# Patient Record
Sex: MALE | Race: WHITE | NOT HISPANIC OR LATINO
[De-identification: names, ages, dates, MRNs, and addresses within clinical notes are randomized per-mention and may not be internally consistent; named-entity substitution may affect disease eponyms.]

---

## 2017-01-07 ENCOUNTER — RX ONLY (OUTPATIENT)
Age: 62
Setting detail: RX ONLY
End: 2017-01-07

## 2018-08-21 ENCOUNTER — OFFICE VISIT (OUTPATIENT)
Dept: MEDICAL GROUP | Facility: PHYSICIAN GROUP | Age: 63
End: 2018-08-21
Payer: COMMERCIAL

## 2018-08-21 VITALS
HEART RATE: 74 BPM | RESPIRATION RATE: 16 BRPM | SYSTOLIC BLOOD PRESSURE: 132 MMHG | HEIGHT: 71 IN | BODY MASS INDEX: 27.94 KG/M2 | WEIGHT: 199.6 LBS | DIASTOLIC BLOOD PRESSURE: 60 MMHG | TEMPERATURE: 97.7 F | OXYGEN SATURATION: 99 %

## 2018-08-21 DIAGNOSIS — E78.2 MIXED HYPERLIPIDEMIA: ICD-10-CM

## 2018-08-21 DIAGNOSIS — E11.9 CONTROLLED TYPE 2 DIABETES MELLITUS WITHOUT COMPLICATION, WITHOUT LONG-TERM CURRENT USE OF INSULIN (HCC): ICD-10-CM

## 2018-08-21 DIAGNOSIS — I10 BENIGN ESSENTIAL HTN: ICD-10-CM

## 2018-08-21 DIAGNOSIS — Z12.11 SCREENING FOR COLORECTAL CANCER: ICD-10-CM

## 2018-08-21 DIAGNOSIS — Z12.12 SCREENING FOR COLORECTAL CANCER: ICD-10-CM

## 2018-08-21 PROCEDURE — 99204 OFFICE O/P NEW MOD 45 MIN: CPT | Performed by: FAMILY MEDICINE

## 2018-08-21 RX ORDER — NITROGLYCERIN 0.4 MG/1
0.4 TABLET SUBLINGUAL
COMMUNITY

## 2018-08-21 RX ORDER — METOPROLOL SUCCINATE 50 MG/1
50 TABLET, EXTENDED RELEASE ORAL DAILY
Qty: 90 TAB | Refills: 1 | Status: SHIPPED | OUTPATIENT
Start: 2018-08-21 | End: 2019-02-07 | Stop reason: SDUPTHER

## 2018-08-21 RX ORDER — LISINOPRIL AND HYDROCHLOROTHIAZIDE 20; 12.5 MG/1; MG/1
1 TABLET ORAL DAILY
COMMUNITY
End: 2018-08-21 | Stop reason: SDUPTHER

## 2018-08-21 RX ORDER — ATORVASTATIN CALCIUM 40 MG/1
40 TABLET, FILM COATED ORAL DAILY
Qty: 90 TAB | Refills: 1 | Status: SHIPPED | OUTPATIENT
Start: 2018-08-21 | End: 2019-02-07 | Stop reason: SDUPTHER

## 2018-08-21 RX ORDER — PIOGLITAZONEHYDROCHLORIDE 30 MG/1
30 TABLET ORAL DAILY
Qty: 90 TAB | Refills: 1 | Status: SHIPPED | OUTPATIENT
Start: 2018-08-21 | End: 2019-02-07 | Stop reason: SDUPTHER

## 2018-08-21 RX ORDER — ASPIRIN 81 MG/1
81 TABLET, CHEWABLE ORAL DAILY
COMMUNITY

## 2018-08-21 RX ORDER — ATORVASTATIN CALCIUM 40 MG/1
40 TABLET, FILM COATED ORAL NIGHTLY
COMMUNITY
End: 2018-08-21 | Stop reason: SDUPTHER

## 2018-08-21 RX ORDER — LISINOPRIL AND HYDROCHLOROTHIAZIDE 20; 12.5 MG/1; MG/1
1 TABLET ORAL DAILY
Qty: 90 TAB | Refills: 1 | Status: SHIPPED | OUTPATIENT
Start: 2018-08-21 | End: 2019-02-07 | Stop reason: SDUPTHER

## 2018-08-21 RX ORDER — METOPROLOL SUCCINATE 50 MG/1
50 TABLET, EXTENDED RELEASE ORAL DAILY
COMMUNITY
End: 2018-08-21 | Stop reason: SDUPTHER

## 2018-08-21 ASSESSMENT — ENCOUNTER SYMPTOMS
HEADACHES: 0
NEUROLOGICAL NEGATIVE: 1
DOUBLE VISION: 0
CARDIOVASCULAR NEGATIVE: 1
MUSCULOSKELETAL NEGATIVE: 1
NAUSEA: 0
CONSTITUTIONAL NEGATIVE: 1
FEVER: 0
DIZZINESS: 0
COUGH: 0
HEMOPTYSIS: 0
GASTROINTESTINAL NEGATIVE: 1
RESPIRATORY NEGATIVE: 1
MYALGIAS: 0
BRUISES/BLEEDS EASILY: 0
CHILLS: 0
DEPRESSION: 0
PALPITATIONS: 0
HEARTBURN: 0
TINGLING: 0
PSYCHIATRIC NEGATIVE: 1
BLURRED VISION: 0
EYES NEGATIVE: 1

## 2018-08-21 ASSESSMENT — PATIENT HEALTH QUESTIONNAIRE - PHQ9: CLINICAL INTERPRETATION OF PHQ2 SCORE: 0

## 2018-08-22 NOTE — PROGRESS NOTES
Subjective:      Sarkis Cruz is a 62 y.o. male who presents with Blood Sugar Problem            1. Controlled type 2 diabetes mellitus without complication, without long-term current use of insulin (HCC)  New patient visit, on metformin and januvia for dm  Prior provider wanted him to be on insulin as he was not in great control but he wants to try other non insulin things before he does that  Will have him work on diet and start actos and f/u with labs in 3 mo  Today a1c 9.7    - aspirin (ASA) 81 MG Chew Tab chewable tablet; Take 81 mg by mouth every day.  - nitroglycerin (NITROSTAT) 0.4 MG SL Tab; Place 0.4 mg under tongue every 5 minutes as needed for Chest Pain.  - pioglitazone (ACTOS) 30 MG Tab; Take 1 Tab by mouth every day.  Dispense: 90 Tab; Refill: 1  - COMP METABOLIC PANEL; Future  - HEMOGLOBIN A1C; Future  - LIPID PROFILE; Future  - MICROALBUMIN 24 HR URINE; Future    2. Benign essential HTN  Currently treated for HTN, taking meds with no CP or sob, monitors bp at home periodically. controlled    - aspirin (ASA) 81 MG Chew Tab chewable tablet; Take 81 mg by mouth every day.  - nitroglycerin (NITROSTAT) 0.4 MG SL Tab; Place 0.4 mg under tongue every 5 minutes as needed for Chest Pain.  - pioglitazone (ACTOS) 30 MG Tab; Take 1 Tab by mouth every day.  Dispense: 90 Tab; Refill: 1  - COMP METABOLIC PANEL; Future  - HEMOGLOBIN A1C; Future  - LIPID PROFILE; Future  - MICROALBUMIN 24 HR URINE; Future    3. Mixed hyperlipidemia  Currently treated for HLD, taking meds with no new myalgias or joint pain, watching fats in diet  controlled    - aspirin (ASA) 81 MG Chew Tab chewable tablet; Take 81 mg by mouth every day.  - nitroglycerin (NITROSTAT) 0.4 MG SL Tab; Place 0.4 mg under tongue every 5 minutes as needed for Chest Pain.  - pioglitazone (ACTOS) 30 MG Tab; Take 1 Tab by mouth every day.  Dispense: 90 Tab; Refill: 1  - COMP METABOLIC PANEL; Future  - HEMOGLOBIN A1C; Future  - LIPID PROFILE; Future  -  MICROALBUMIN 24 HR URINE; Future    4. Screening for colorectal cancer    - REFERRAL TO GI FOR COLONOSCOPY    Past Medical History:  No date: Diabetes (HCC)  No date: Hyperlipidemia  No date: Hypertension  Past Surgical History:  No date: UVULOPHARYNGOPALATOPLASTY  Smoking status: Never Smoker                                                              Smokeless tobacco: Never Used                      Alcohol use: No              History reviewed.  No pertinent family history.      Current Outpatient Prescriptions: •  aspirin (ASA) 81 MG Chew Tab chewable tablet, Take 81 mg by mouth every day., Disp: , Rfl: •  nitroglycerin (NITROSTAT) 0.4 MG SL Tab, Place 0.4 mg under tongue every 5 minutes as needed for Chest Pain., Disp: , Rfl: •  pioglitazone (ACTOS) 30 MG Tab, Take 1 Tab by mouth every day., Disp: 90 Tab, Rfl: 1•  atorvastatin (LIPITOR) 40 MG Tab, Take 1 Tab by mouth every day., Disp: 90 Tab, Rfl: 1•  lisinopril-hydrochlorothiazide (PRINZIDE, ZESTORETIC) 20-12.5 MG per tablet, Take 1 Tab by mouth every day., Disp: 90 Tab, Rfl: 1•  metformin (GLUCOPHAGE) 1000 MG tablet, Take 1 Tab by mouth 2 times a day, with meals., Disp: 180 Tab, Rfl: 1•  metoprolol SR (TOPROL XL) 50 MG TABLET SR 24 HR, Take 1 Tab by mouth every day., Disp: 90 Tab, Rfl: 1•  SITagliptin (JANUVIA) 100 MG Tab, Take 1 Tab by mouth every day., Disp: 90 Tab, Rfl: 1    Patient was instructed on the use of medications, either prescriptions or OTC and informed on when the appropriate follow up time period should be. In addition, patient was also instructed that should any acute worsening occur that they should notify this clinic asap or call 911.            Review of Systems   Constitutional: Negative.  Negative for chills and fever.   HENT: Negative.  Negative for hearing loss.    Eyes: Negative.  Negative for blurred vision and double vision.   Respiratory: Negative.  Negative for cough and hemoptysis.    Cardiovascular: Negative.  Negative for  "chest pain and palpitations.   Gastrointestinal: Negative.  Negative for heartburn and nausea.   Genitourinary: Negative.  Negative for dysuria.   Musculoskeletal: Negative.  Negative for myalgias.   Skin: Negative.  Negative for rash.   Neurological: Negative.  Negative for dizziness, tingling and headaches.   Endo/Heme/Allergies: Negative.  Does not bruise/bleed easily.   Psychiatric/Behavioral: Negative.  Negative for depression and suicidal ideas.   All other systems reviewed and are negative.         Objective:     /60   Pulse 74   Temp 36.5 °C (97.7 °F)   Resp 16   Ht 1.803 m (5' 11\")   Wt 90.5 kg (199 lb 9.6 oz)   SpO2 99%   BMI 27.84 kg/m²      Physical Exam   Constitutional: He is oriented to person, place, and time. He appears well-developed and well-nourished. No distress.   HENT:   Head: Normocephalic and atraumatic.   Mouth/Throat: Oropharynx is clear and moist. No oropharyngeal exudate.   Eyes: Pupils are equal, round, and reactive to light.   Cardiovascular: Normal rate, regular rhythm, normal heart sounds and intact distal pulses.  Exam reveals no gallop and no friction rub.    No murmur heard.  Pulmonary/Chest: Effort normal and breath sounds normal. No respiratory distress. He has no wheezes. He has no rales. He exhibits no tenderness.   Neurological: He is alert and oriented to person, place, and time.   Skin: He is not diaphoretic.   Psychiatric: He has a normal mood and affect. His behavior is normal. Judgment and thought content normal.   Nursing note and vitals reviewed.              Assessment/Plan:     1. Controlled type 2 diabetes mellitus without complication, without long-term current use of insulin (HCC)    - aspirin (ASA) 81 MG Chew Tab chewable tablet; Take 81 mg by mouth every day.  - nitroglycerin (NITROSTAT) 0.4 MG SL Tab; Place 0.4 mg under tongue every 5 minutes as needed for Chest Pain.  - pioglitazone (ACTOS) 30 MG Tab; Take 1 Tab by mouth every day.  Dispense: 90 " Tab; Refill: 1  - COMP METABOLIC PANEL; Future  - HEMOGLOBIN A1C; Future  - LIPID PROFILE; Future  - MICROALBUMIN 24 HR URINE; Future    2. Benign essential HTN    - aspirin (ASA) 81 MG Chew Tab chewable tablet; Take 81 mg by mouth every day.  - nitroglycerin (NITROSTAT) 0.4 MG SL Tab; Place 0.4 mg under tongue every 5 minutes as needed for Chest Pain.  - pioglitazone (ACTOS) 30 MG Tab; Take 1 Tab by mouth every day.  Dispense: 90 Tab; Refill: 1  - COMP METABOLIC PANEL; Future  - HEMOGLOBIN A1C; Future  - LIPID PROFILE; Future  - MICROALBUMIN 24 HR URINE; Future    3. Mixed hyperlipidemia    - aspirin (ASA) 81 MG Chew Tab chewable tablet; Take 81 mg by mouth every day.  - nitroglycerin (NITROSTAT) 0.4 MG SL Tab; Place 0.4 mg under tongue every 5 minutes as needed for Chest Pain.  - pioglitazone (ACTOS) 30 MG Tab; Take 1 Tab by mouth every day.  Dispense: 90 Tab; Refill: 1  - COMP METABOLIC PANEL; Future  - HEMOGLOBIN A1C; Future  - LIPID PROFILE; Future  - MICROALBUMIN 24 HR URINE; Future    4. Screening for colorectal cancer  - REFERRAL TO GI FOR COLONOSCOPY

## 2019-02-07 DIAGNOSIS — E78.2 MIXED HYPERLIPIDEMIA: ICD-10-CM

## 2019-02-07 DIAGNOSIS — E11.9 CONTROLLED TYPE 2 DIABETES MELLITUS WITHOUT COMPLICATION, WITHOUT LONG-TERM CURRENT USE OF INSULIN (HCC): ICD-10-CM

## 2019-02-07 DIAGNOSIS — I10 BENIGN ESSENTIAL HTN: ICD-10-CM

## 2019-02-07 RX ORDER — ATORVASTATIN CALCIUM 40 MG/1
TABLET, FILM COATED ORAL
Qty: 90 TAB | Refills: 1 | Status: SHIPPED | OUTPATIENT
Start: 2019-02-07

## 2019-02-07 RX ORDER — METOPROLOL SUCCINATE 50 MG/1
TABLET, EXTENDED RELEASE ORAL
Qty: 90 TAB | Refills: 2 | Status: SHIPPED | OUTPATIENT
Start: 2019-02-07

## 2019-02-07 RX ORDER — LISINOPRIL AND HYDROCHLOROTHIAZIDE 20; 12.5 MG/1; MG/1
TABLET ORAL
Qty: 90 TAB | Refills: 1 | Status: SHIPPED | OUTPATIENT
Start: 2019-02-07

## 2019-02-07 RX ORDER — PIOGLITAZONEHYDROCHLORIDE 30 MG/1
TABLET ORAL
Qty: 90 TAB | Refills: 2 | Status: SHIPPED | OUTPATIENT
Start: 2019-02-07 | End: 2023-02-05

## 2019-02-07 RX ORDER — SITAGLIPTIN 100 MG/1
TABLET, FILM COATED ORAL
Qty: 90 TAB | Refills: 2 | Status: SHIPPED | OUTPATIENT
Start: 2019-02-07 | End: 2023-02-05

## 2019-02-07 NOTE — TELEPHONE ENCOUNTER
Was the patient seen in the last year in this department? Yes    Does patient have an active prescription for medications requested? Yes    Received Request Via: Pharmacy        Last visit: 8/21/18  Last labs:8/21/18

## 2021-06-30 ENCOUNTER — APPOINTMENT (RX ONLY)
Dept: URBAN - METROPOLITAN AREA CLINIC 4 | Facility: CLINIC | Age: 66
Setting detail: DERMATOLOGY
End: 2021-06-30

## 2021-06-30 DIAGNOSIS — Z71.89 OTHER SPECIFIED COUNSELING: ICD-10-CM

## 2021-06-30 DIAGNOSIS — D18.0 HEMANGIOMA: ICD-10-CM

## 2021-06-30 DIAGNOSIS — L57.0 ACTINIC KERATOSIS: ICD-10-CM

## 2021-06-30 DIAGNOSIS — L81.4 OTHER MELANIN HYPERPIGMENTATION: ICD-10-CM

## 2021-06-30 DIAGNOSIS — D22 MELANOCYTIC NEVI: ICD-10-CM

## 2021-06-30 DIAGNOSIS — L82.1 OTHER SEBORRHEIC KERATOSIS: ICD-10-CM

## 2021-06-30 DIAGNOSIS — L21.8 OTHER SEBORRHEIC DERMATITIS: ICD-10-CM

## 2021-06-30 PROBLEM — D18.01 HEMANGIOMA OF SKIN AND SUBCUTANEOUS TISSUE: Status: ACTIVE | Noted: 2021-06-30

## 2021-06-30 PROBLEM — D22.5 MELANOCYTIC NEVI OF TRUNK: Status: ACTIVE | Noted: 2021-06-30

## 2021-06-30 PROBLEM — D48.5 NEOPLASM OF UNCERTAIN BEHAVIOR OF SKIN: Status: ACTIVE | Noted: 2021-06-30

## 2021-06-30 PROCEDURE — ? BIOPSY BY SHAVE METHOD

## 2021-06-30 PROCEDURE — ? PRESCRIPTION

## 2021-06-30 PROCEDURE — ? COUNSELING

## 2021-06-30 PROCEDURE — 17000 DESTRUCT PREMALG LESION: CPT | Mod: 59

## 2021-06-30 PROCEDURE — ? LIQUID NITROGEN

## 2021-06-30 PROCEDURE — ? SUNSCREEN TREATMENT REGIMEN

## 2021-06-30 PROCEDURE — 99203 OFFICE O/P NEW LOW 30 MIN: CPT | Mod: 25

## 2021-06-30 PROCEDURE — ? SUNSCREEN RECOMMENDATIONS

## 2021-06-30 PROCEDURE — 11102 TANGNTL BX SKIN SINGLE LES: CPT | Mod: 59

## 2021-06-30 PROCEDURE — 69100 BIOPSY OF EXTERNAL EAR: CPT | Mod: 59

## 2021-06-30 PROCEDURE — 17003 DESTRUCT PREMALG LES 2-14: CPT

## 2021-06-30 RX ORDER — HYDROCORTISONE 25 MG/G
1 CREAM TOPICAL BID
Qty: 1 | Refills: 1 | Status: ERX | COMMUNITY
Start: 2021-06-30

## 2021-06-30 RX ADMIN — HYDROCORTISONE 1: 25 CREAM TOPICAL at 00:00

## 2021-06-30 ASSESSMENT — LOCATION ZONE DERM
LOCATION ZONE: NOSE
LOCATION ZONE: EAR
LOCATION ZONE: FACE
LOCATION ZONE: HAND
LOCATION ZONE: SCALP
LOCATION ZONE: TRUNK
LOCATION ZONE: LEG

## 2021-06-30 ASSESSMENT — LOCATION SIMPLE DESCRIPTION DERM
LOCATION SIMPLE: LEFT CHEEK
LOCATION SIMPLE: ABDOMEN
LOCATION SIMPLE: LEFT HAND
LOCATION SIMPLE: LEFT ANKLE
LOCATION SIMPLE: RIGHT ZYGOMA
LOCATION SIMPLE: ANTERIOR SCALP
LOCATION SIMPLE: LEFT UPPER BACK
LOCATION SIMPLE: RIGHT HAND
LOCATION SIMPLE: RIGHT EAR
LOCATION SIMPLE: CHEST
LOCATION SIMPLE: NOSE

## 2021-06-30 ASSESSMENT — LOCATION DETAILED DESCRIPTION DERM
LOCATION DETAILED: LEFT MEDIAL INFERIOR CHEST
LOCATION DETAILED: PERIUMBILICAL SKIN
LOCATION DETAILED: LEFT INFERIOR CENTRAL MALAR CHEEK
LOCATION DETAILED: STERNUM
LOCATION DETAILED: RIGHT INFERIOR HELIX
LOCATION DETAILED: LEFT ANKLE
LOCATION DETAILED: RIGHT RADIAL DORSAL HAND
LOCATION DETAILED: RIGHT MEDIAL ZYGOMA
LOCATION DETAILED: LEFT CENTRAL MALAR CHEEK
LOCATION DETAILED: LEFT SUPERIOR CENTRAL MALAR CHEEK
LOCATION DETAILED: LEFT SUPERIOR UPPER BACK
LOCATION DETAILED: EPIGASTRIC SKIN
LOCATION DETAILED: NASAL TIP
LOCATION DETAILED: LEFT ULNAR DORSAL HAND
LOCATION DETAILED: MID-FRONTAL SCALP

## 2023-02-05 ENCOUNTER — HOSPITAL ENCOUNTER (INPATIENT)
Facility: MEDICAL CENTER | Age: 68
LOS: 11 days | DRG: 373 | End: 2023-02-16
Attending: EMERGENCY MEDICINE | Admitting: INTERNAL MEDICINE
Payer: MEDICARE

## 2023-02-05 ENCOUNTER — APPOINTMENT (OUTPATIENT)
Dept: RADIOLOGY | Facility: MEDICAL CENTER | Age: 68
DRG: 373 | End: 2023-02-05
Attending: EMERGENCY MEDICINE
Payer: MEDICARE

## 2023-02-05 DIAGNOSIS — K65.1 PELVIC ABSCESS IN MALE (HCC): ICD-10-CM

## 2023-02-05 DIAGNOSIS — R10.32 LEFT LOWER QUADRANT ABDOMINAL PAIN: ICD-10-CM

## 2023-02-05 DIAGNOSIS — R19.00 PELVIC MASS: ICD-10-CM

## 2023-02-05 DIAGNOSIS — E11.9 CONTROLLED TYPE 2 DIABETES MELLITUS WITHOUT COMPLICATION, WITHOUT LONG-TERM CURRENT USE OF INSULIN (HCC): ICD-10-CM

## 2023-02-05 PROBLEM — R74.8 ELEVATED LIPASE: Status: ACTIVE | Noted: 2023-02-05

## 2023-02-05 PROBLEM — G89.29 CHRONIC RIGHT SHOULDER PAIN: Status: ACTIVE | Noted: 2023-02-05

## 2023-02-05 PROBLEM — M25.511 CHRONIC RIGHT SHOULDER PAIN: Status: ACTIVE | Noted: 2023-02-05

## 2023-02-05 PROBLEM — D64.9 NORMOCYTIC ANEMIA: Status: ACTIVE | Noted: 2023-02-05

## 2023-02-05 PROBLEM — Z85.46 HISTORY OF PROSTATE CANCER: Status: ACTIVE | Noted: 2023-02-05

## 2023-02-05 PROBLEM — K68.12 PSOAS ABSCESS (HCC): Status: ACTIVE | Noted: 2023-02-05

## 2023-02-05 LAB
ALBUMIN SERPL BCP-MCNC: 3.3 G/DL (ref 3.2–4.9)
ALBUMIN/GLOB SERPL: 0.9 G/DL
ALP SERPL-CCNC: 91 U/L (ref 30–99)
ALT SERPL-CCNC: 28 U/L (ref 2–50)
ANION GAP SERPL CALC-SCNC: 13 MMOL/L (ref 7–16)
APPEARANCE UR: CLEAR
AST SERPL-CCNC: 22 U/L (ref 12–45)
BASOPHILS # BLD AUTO: 0.2 % (ref 0–1.8)
BASOPHILS # BLD: 0.03 K/UL (ref 0–0.12)
BILIRUB SERPL-MCNC: 0.5 MG/DL (ref 0.1–1.5)
BILIRUB UR QL STRIP.AUTO: NEGATIVE
BLOOD CULTURE HOLD CXBCH: NORMAL
BLOOD CULTURE HOLD CXBCH: NORMAL
BUN SERPL-MCNC: 17 MG/DL (ref 8–22)
CALCIUM ALBUM COR SERPL-MCNC: 9.5 MG/DL (ref 8.5–10.5)
CALCIUM SERPL-MCNC: 8.9 MG/DL (ref 8.5–10.5)
CHLORIDE SERPL-SCNC: 97 MMOL/L (ref 96–112)
CO2 SERPL-SCNC: 27 MMOL/L (ref 20–33)
COLOR UR: YELLOW
CREAT SERPL-MCNC: 0.94 MG/DL (ref 0.5–1.4)
EOSINOPHIL # BLD AUTO: 0.09 K/UL (ref 0–0.51)
EOSINOPHIL NFR BLD: 0.7 % (ref 0–6.9)
ERYTHROCYTE [DISTWIDTH] IN BLOOD BY AUTOMATED COUNT: 42.5 FL (ref 35.9–50)
GFR SERPLBLD CREATININE-BSD FMLA CKD-EPI: 88 ML/MIN/1.73 M 2
GLOBULIN SER CALC-MCNC: 3.6 G/DL (ref 1.9–3.5)
GLUCOSE BLD STRIP.AUTO-MCNC: 78 MG/DL (ref 65–99)
GLUCOSE BLD STRIP.AUTO-MCNC: 91 MG/DL (ref 65–99)
GLUCOSE SERPL-MCNC: 107 MG/DL (ref 65–99)
GLUCOSE UR STRIP.AUTO-MCNC: NEGATIVE MG/DL
HCT VFR BLD AUTO: 35.6 % (ref 42–52)
HGB BLD-MCNC: 11.7 G/DL (ref 14–18)
IMM GRANULOCYTES # BLD AUTO: 0.05 K/UL (ref 0–0.11)
IMM GRANULOCYTES NFR BLD AUTO: 0.4 % (ref 0–0.9)
KETONES UR STRIP.AUTO-MCNC: NEGATIVE MG/DL
LEUKOCYTE ESTERASE UR QL STRIP.AUTO: NEGATIVE
LIPASE SERPL-CCNC: 194 U/L (ref 11–82)
LYMPHOCYTES # BLD AUTO: 1.14 K/UL (ref 1–4.8)
LYMPHOCYTES NFR BLD: 8.5 % (ref 22–41)
MCH RBC QN AUTO: 29.1 PG (ref 27–33)
MCHC RBC AUTO-ENTMCNC: 32.9 G/DL (ref 33.7–35.3)
MCV RBC AUTO: 88.6 FL (ref 81.4–97.8)
MICRO URNS: NORMAL
MONOCYTES # BLD AUTO: 1.09 K/UL (ref 0–0.85)
MONOCYTES NFR BLD AUTO: 8.2 % (ref 0–13.4)
NEUTROPHILS # BLD AUTO: 10.97 K/UL (ref 1.82–7.42)
NEUTROPHILS NFR BLD: 82 % (ref 44–72)
NITRITE UR QL STRIP.AUTO: NEGATIVE
NRBC # BLD AUTO: 0 K/UL
NRBC BLD-RTO: 0 /100 WBC
PH UR STRIP.AUTO: 5 [PH] (ref 5–8)
PLATELET # BLD AUTO: 414 K/UL (ref 164–446)
PMV BLD AUTO: 9.1 FL (ref 9–12.9)
POTASSIUM SERPL-SCNC: 3.1 MMOL/L (ref 3.6–5.5)
PROT SERPL-MCNC: 6.9 G/DL (ref 6–8.2)
PROT UR QL STRIP: NEGATIVE MG/DL
RBC # BLD AUTO: 4.02 M/UL (ref 4.7–6.1)
RBC UR QL AUTO: NEGATIVE
SODIUM SERPL-SCNC: 137 MMOL/L (ref 135–145)
SP GR UR STRIP.AUTO: 1.02
UROBILINOGEN UR STRIP.AUTO-MCNC: 1 MG/DL
WBC # BLD AUTO: 13.4 K/UL (ref 4.8–10.8)

## 2023-02-05 PROCEDURE — 82962 GLUCOSE BLOOD TEST: CPT

## 2023-02-05 PROCEDURE — 96365 THER/PROPH/DIAG IV INF INIT: CPT

## 2023-02-05 PROCEDURE — 74177 CT ABD & PELVIS W/CONTRAST: CPT

## 2023-02-05 PROCEDURE — 99223 1ST HOSP IP/OBS HIGH 75: CPT | Performed by: INTERNAL MEDICINE

## 2023-02-05 PROCEDURE — 80053 COMPREHEN METABOLIC PANEL: CPT

## 2023-02-05 PROCEDURE — 36415 COLL VENOUS BLD VENIPUNCTURE: CPT

## 2023-02-05 PROCEDURE — A9270 NON-COVERED ITEM OR SERVICE: HCPCS

## 2023-02-05 PROCEDURE — 99285 EMERGENCY DEPT VISIT HI MDM: CPT

## 2023-02-05 PROCEDURE — 96367 TX/PROPH/DG ADDL SEQ IV INF: CPT

## 2023-02-05 PROCEDURE — 700117 HCHG RX CONTRAST REV CODE 255: Performed by: EMERGENCY MEDICINE

## 2023-02-05 PROCEDURE — 700101 HCHG RX REV CODE 250

## 2023-02-05 PROCEDURE — 700105 HCHG RX REV CODE 258: Performed by: EMERGENCY MEDICINE

## 2023-02-05 PROCEDURE — 700111 HCHG RX REV CODE 636 W/ 250 OVERRIDE (IP): Performed by: EMERGENCY MEDICINE

## 2023-02-05 PROCEDURE — 770020 HCHG ROOM/CARE - TELE (206)

## 2023-02-05 PROCEDURE — 700102 HCHG RX REV CODE 250 W/ 637 OVERRIDE(OP)

## 2023-02-05 PROCEDURE — 700101 HCHG RX REV CODE 250: Performed by: EMERGENCY MEDICINE

## 2023-02-05 PROCEDURE — 81003 URINALYSIS AUTO W/O SCOPE: CPT

## 2023-02-05 PROCEDURE — 96375 TX/PRO/DX INJ NEW DRUG ADDON: CPT

## 2023-02-05 PROCEDURE — 85025 COMPLETE CBC W/AUTO DIFF WBC: CPT

## 2023-02-05 PROCEDURE — 83690 ASSAY OF LIPASE: CPT

## 2023-02-05 RX ORDER — METRONIDAZOLE 500 MG/100ML
500 INJECTION, SOLUTION INTRAVENOUS EVERY 8 HOURS
Status: DISCONTINUED | OUTPATIENT
Start: 2023-02-05 | End: 2023-02-05

## 2023-02-05 RX ORDER — INSULIN DEGLUDEC 200 U/ML
60 INJECTION, SOLUTION SUBCUTANEOUS
COMMUNITY
End: 2023-03-06

## 2023-02-05 RX ORDER — OXYCODONE HYDROCHLORIDE 5 MG/1
2.5 TABLET ORAL EVERY 4 HOURS PRN
Status: DISCONTINUED | OUTPATIENT
Start: 2023-02-05 | End: 2023-02-07

## 2023-02-05 RX ORDER — OXYCODONE HYDROCHLORIDE 5 MG/1
5 TABLET ORAL EVERY 4 HOURS PRN
Status: DISCONTINUED | OUTPATIENT
Start: 2023-02-05 | End: 2023-02-07

## 2023-02-05 RX ORDER — BISACODYL 10 MG
10 SUPPOSITORY, RECTAL RECTAL
Status: DISCONTINUED | OUTPATIENT
Start: 2023-02-05 | End: 2023-02-16 | Stop reason: HOSPADM

## 2023-02-05 RX ORDER — ACETAMINOPHEN 325 MG/1
650 TABLET ORAL EVERY 6 HOURS PRN
Status: DISCONTINUED | OUTPATIENT
Start: 2023-02-05 | End: 2023-02-16 | Stop reason: HOSPADM

## 2023-02-05 RX ORDER — LIDOCAINE 50 MG/G
1 PATCH TOPICAL EVERY 24 HOURS
Status: DISCONTINUED | OUTPATIENT
Start: 2023-02-05 | End: 2023-02-16 | Stop reason: HOSPADM

## 2023-02-05 RX ORDER — HYDROCHLOROTHIAZIDE 25 MG/1
12.5 TABLET ORAL
Status: DISCONTINUED | OUTPATIENT
Start: 2023-02-06 | End: 2023-02-16 | Stop reason: HOSPADM

## 2023-02-05 RX ORDER — POTASSIUM CHLORIDE 20 MEQ/1
40 TABLET, EXTENDED RELEASE ORAL DAILY
Status: COMPLETED | OUTPATIENT
Start: 2023-02-05 | End: 2023-02-06

## 2023-02-05 RX ORDER — HEPARIN SODIUM 5000 [USP'U]/ML
5000 INJECTION, SOLUTION INTRAVENOUS; SUBCUTANEOUS EVERY 8 HOURS
Status: DISCONTINUED | OUTPATIENT
Start: 2023-02-05 | End: 2023-02-16 | Stop reason: HOSPADM

## 2023-02-05 RX ORDER — METOPROLOL SUCCINATE 25 MG/1
50 TABLET, EXTENDED RELEASE ORAL
Status: DISCONTINUED | OUTPATIENT
Start: 2023-02-06 | End: 2023-02-16 | Stop reason: HOSPADM

## 2023-02-05 RX ORDER — CEFTRIAXONE 2 G/1
2000 INJECTION, POWDER, FOR SOLUTION INTRAMUSCULAR; INTRAVENOUS ONCE
Status: COMPLETED | OUTPATIENT
Start: 2023-02-05 | End: 2023-02-05

## 2023-02-05 RX ORDER — ATORVASTATIN CALCIUM 40 MG/1
40 TABLET, FILM COATED ORAL EVERY EVENING
Status: DISCONTINUED | OUTPATIENT
Start: 2023-02-05 | End: 2023-02-16 | Stop reason: HOSPADM

## 2023-02-05 RX ORDER — LISINOPRIL 20 MG/1
20 TABLET ORAL
Status: DISCONTINUED | OUTPATIENT
Start: 2023-02-06 | End: 2023-02-16 | Stop reason: HOSPADM

## 2023-02-05 RX ORDER — AMOXICILLIN 250 MG
2 CAPSULE ORAL 2 TIMES DAILY
Status: DISCONTINUED | OUTPATIENT
Start: 2023-02-05 | End: 2023-02-16 | Stop reason: HOSPADM

## 2023-02-05 RX ORDER — SODIUM CHLORIDE 9 MG/ML
1000 INJECTION, SOLUTION INTRAVENOUS ONCE
Status: COMPLETED | OUTPATIENT
Start: 2023-02-05 | End: 2023-02-05

## 2023-02-05 RX ORDER — METRONIDAZOLE 500 MG/100ML
500 INJECTION, SOLUTION INTRAVENOUS ONCE
Status: COMPLETED | OUTPATIENT
Start: 2023-02-05 | End: 2023-02-05

## 2023-02-05 RX ORDER — POTASSIUM CHLORIDE 7.45 MG/ML
10 INJECTION INTRAVENOUS ONCE
Status: COMPLETED | OUTPATIENT
Start: 2023-02-05 | End: 2023-02-05

## 2023-02-05 RX ORDER — SIMETHICONE 125 MG
125 TABLET,CHEWABLE ORAL 3 TIMES DAILY PRN
Status: DISCONTINUED | OUTPATIENT
Start: 2023-02-05 | End: 2023-02-16 | Stop reason: HOSPADM

## 2023-02-05 RX ORDER — POLYETHYLENE GLYCOL 3350 17 G/17G
1 POWDER, FOR SOLUTION ORAL
Status: DISCONTINUED | OUTPATIENT
Start: 2023-02-05 | End: 2023-02-16 | Stop reason: HOSPADM

## 2023-02-05 RX ADMIN — SODIUM CHLORIDE 1000 ML: 9 INJECTION, SOLUTION INTRAVENOUS at 12:12

## 2023-02-05 RX ADMIN — IOHEXOL 100 ML: 350 INJECTION, SOLUTION INTRAVENOUS at 11:57

## 2023-02-05 RX ADMIN — POTASSIUM CHLORIDE 10 MEQ: 7.46 INJECTION, SOLUTION INTRAVENOUS at 12:18

## 2023-02-05 RX ADMIN — POTASSIUM CHLORIDE 40 MEQ: 1500 TABLET, EXTENDED RELEASE ORAL at 17:09

## 2023-02-05 RX ADMIN — ACETAMINOPHEN 650 MG: 325 TABLET, FILM COATED ORAL at 14:43

## 2023-02-05 RX ADMIN — CEFTRIAXONE SODIUM 2000 MG: 2 INJECTION, POWDER, FOR SOLUTION INTRAMUSCULAR; INTRAVENOUS at 13:36

## 2023-02-05 RX ADMIN — OXYCODONE HYDROCHLORIDE 5 MG: 5 TABLET ORAL at 19:29

## 2023-02-05 RX ADMIN — ATORVASTATIN CALCIUM 40 MG: 40 TABLET, FILM COATED ORAL at 17:09

## 2023-02-05 RX ADMIN — OXYCODONE HYDROCHLORIDE 5 MG: 5 TABLET ORAL at 23:54

## 2023-02-05 RX ADMIN — SENNOSIDES AND DOCUSATE SODIUM 2 TABLET: 50; 8.6 TABLET ORAL at 17:09

## 2023-02-05 RX ADMIN — LIDOCAINE 1 PATCH: 50 PATCH TOPICAL at 14:50

## 2023-02-05 RX ADMIN — METRONIDAZOLE 500 MG: 5 INJECTION, SOLUTION INTRAVENOUS at 13:41

## 2023-02-05 RX ADMIN — SIMETHICONE 125 MG: 125 TABLET, CHEWABLE ORAL at 22:20

## 2023-02-05 ASSESSMENT — ENCOUNTER SYMPTOMS
DIARRHEA: 0
NAUSEA: 0
ORTHOPNEA: 0
SHORTNESS OF BREATH: 0
PALPITATIONS: 0
BLOOD IN STOOL: 0
NECK PAIN: 0
CHILLS: 0
SORE THROAT: 0
HEMOPTYSIS: 0
COUGH: 0
SEIZURES: 0
DIAPHORESIS: 0
LOSS OF CONSCIOUSNESS: 0
WHEEZING: 0
CLAUDICATION: 0
HEADACHES: 0
PND: 0
FLANK PAIN: 0
HEARTBURN: 0
ABDOMINAL PAIN: 1
BACK PAIN: 1
FALLS: 0
MYALGIAS: 1
WEIGHT LOSS: 0
CONSTIPATION: 1
FEVER: 0
SINUS PAIN: 0
WEAKNESS: 1
VOMITING: 0
DIZZINESS: 0
SPUTUM PRODUCTION: 0

## 2023-02-05 ASSESSMENT — COGNITIVE AND FUNCTIONAL STATUS - GENERAL
CLIMB 3 TO 5 STEPS WITH RAILING: A LITTLE
DAILY ACTIVITIY SCORE: 20
SUGGESTED CMS G CODE MODIFIER DAILY ACTIVITY: CJ
STANDING UP FROM CHAIR USING ARMS: A LITTLE
DRESSING REGULAR UPPER BODY CLOTHING: A LITTLE
DRESSING REGULAR LOWER BODY CLOTHING: A LITTLE
SUGGESTED CMS G CODE MODIFIER MOBILITY: CK
HELP NEEDED FOR BATHING: A LITTLE
MOVING TO AND FROM BED TO CHAIR: A LITTLE
MOBILITY SCORE: 18
MOVING FROM LYING ON BACK TO SITTING ON SIDE OF FLAT BED: A LITTLE
TOILETING: A LITTLE
WALKING IN HOSPITAL ROOM: A LITTLE
TURNING FROM BACK TO SIDE WHILE IN FLAT BAD: A LITTLE

## 2023-02-05 ASSESSMENT — PAIN DESCRIPTION - PAIN TYPE: TYPE: ACUTE PAIN

## 2023-02-05 ASSESSMENT — LIFESTYLE VARIABLES
TOTAL SCORE: 0
ON A TYPICAL DAY WHEN YOU DRINK ALCOHOL HOW MANY DRINKS DO YOU HAVE: 0
ALCOHOL_USE: NO
HAVE PEOPLE ANNOYED YOU BY CRITICIZING YOUR DRINKING: NO
DOES PATIENT WANT TO STOP DRINKING: NO
TOTAL SCORE: 0
HAVE YOU EVER FELT YOU SHOULD CUT DOWN ON YOUR DRINKING: NO
HOW MANY TIMES IN THE PAST YEAR HAVE YOU HAD 5 OR MORE DRINKS IN A DAY: 0
EVER HAD A DRINK FIRST THING IN THE MORNING TO STEADY YOUR NERVES TO GET RID OF A HANGOVER: NO
EVER FELT BAD OR GUILTY ABOUT YOUR DRINKING: NO
TOTAL SCORE: 0
AVERAGE NUMBER OF DAYS PER WEEK YOU HAVE A DRINK CONTAINING ALCOHOL: 0
CONSUMPTION TOTAL: NEGATIVE

## 2023-02-05 ASSESSMENT — FIBROSIS 4 INDEX
FIB4 SCORE: 0.68
FIB4 SCORE: 0.68

## 2023-02-05 ASSESSMENT — PAIN SCALES - PAIN ASSESSMENT IN ADVANCED DEMENTIA (PAINAD): BREATHING: NORMAL

## 2023-02-05 ASSESSMENT — PATIENT HEALTH QUESTIONNAIRE - PHQ9
1. LITTLE INTEREST OR PLEASURE IN DOING THINGS: NOT AT ALL
SUM OF ALL RESPONSES TO PHQ9 QUESTIONS 1 AND 2: 0
2. FEELING DOWN, DEPRESSED, IRRITABLE, OR HOPELESS: NOT AT ALL

## 2023-02-05 NOTE — ED NOTES
IV left forearm established, blood cultures drawn and sent to lab. Medicated per MAR. [facility] medical team at bedside.

## 2023-02-05 NOTE — ASSESSMENT & PLAN NOTE
-Prinzide not available, ordered seperately as Lisinopril 20 and HCTZ 12.5   -Continue home metoprolol XL 50

## 2023-02-05 NOTE — H&P
"[facility] History & Physical Note    Date of Service  2/5/2023    [facility] Team: R IM White Team   Attending: Jose Cruz Obando M.d.  Senior Resident: Dr. Madison  Intern:  Dr. Magallanes  Contact Number: [phone]    Primary Care Physician  Pcp Not In Computer    Consultants  None    Code Status  Full Code    Chief Complaint  Chief Complaint   Patient presents with    Groin Pain     L groin x 10 days, denies pain with urination but pt has decreased sensation s/t radiation tx for prostate cancer 1 year ago, denies hematuria     Loss of Appetite     X 2 weeks, states he has felt ill and had chills/tremors two weeks ago    Hip Pain     L posterior hip X 2 weeks, pt states he has been staying in bed and sleeping mostly for 2 weeks, believes his hip hurts from laying on it excessively, pain keeps pt from moving LLE well    Constipation     LBM 3 days ago but states it was small, pt is taking stool softeners        History of Presenting Illness (HPI):   Srinivasan Cruz is a 67 y.o. male with PMH of type 2 diabetes, HLD, and HTN who presented 2/5/2023 with chief complaint of 2 weeks of hip pain. Patient reported that about 2 weeks ago he started having fevers, chills, and severe L hip and and L lower back pain. He described the pain as \"like a charley horse\" but not quite like cramping. His pain was exacerbated with any movement (sitting, twisting, walking) and with pushing on the area. He tried ibuprofen and tylenol without relief. Nothing has alleviated his sx. He also noted about 1 wk history of groin pain that started acutely after he twisted while trying to move. He initially presented to an urgent care in [place] and was prescribed a muscle relaxant for suspected sciatica. When his pain continued he decided to present to the ED.     On presenting to the ED vitals unremarkable. Labs notable for WBCs 13.4, Hgb 11.7, Lipase 194, and Potassium 3.1. Blood cultures were collected. CT abdomen/pelvis noted " nonspecific hypodense mass vs complex fluid collection in the L medial hemipelvis abutting L iliopsoas. Pancreas was unremarkable. ROS was notable for abdominal pain and constipation (see full ROS below)     Reviewed code status with pt, he would like to remain full code at this time.     I discussed the plan of care with  patient and internal medicine team .    Review of Systems  Review of Systems   Constitutional:  Positive for malaise/fatigue. Negative for chills, diaphoresis, fever and weight loss.   HENT:  Negative for congestion, sinus pain and sore throat.    Respiratory:  Negative for cough, hemoptysis, sputum production, shortness of breath and wheezing.    Cardiovascular:  Negative for chest pain, palpitations, orthopnea, claudication, leg swelling and PND.   Gastrointestinal:  Positive for abdominal pain and constipation. Negative for blood in stool, diarrhea, heartburn, melena, nausea and vomiting.   Genitourinary:  Negative for dysuria, flank pain, frequency, hematuria and urgency.   Musculoskeletal:  Positive for back pain, joint pain and myalgias. Negative for falls and neck pain.        L hip pain   Skin:  Negative for itching and rash.   Neurological:  Positive for weakness. Negative for dizziness, seizures, loss of consciousness and headaches.     Past Medical History   has a past medical history of Diabetes (HCC), Hyperlipidemia, and Hypertension.    Surgical History   has a past surgical history that includes uvulopharyngopalatoplasty.     Family History  family history includes Arthritis in his mother; Cancer in his father; Osteoporosis in his mother.   Family history reviewed with patient.     Social History  Tobacco: None  Alcohol: Occasional   Recreational drugs (illegal or prescription): None  Employment: Retired  Living Situation: Lives at home with wife   Recent Travel: none  Primary Care Provider: Not Reviewed  Other (stressors, spirituality, exposures): None    Allergies  Allergies    Allergen Reactions    Seasonal Shortness of Breath       Medications  Prior to Admission Medications   Prescriptions Last Dose Informant Patient Reported? Taking?   aspirin (ASA) 81 MG Chew Tab chewable tablet 2/5/2023 at am  Yes No   Sig: Take 81 mg by mouth every day.   atorvastatin (LIPITOR) 40 MG Tab 2/4/2023 at hs  No No   Sig: TAKE ONE TABLET BY MOUTH EVERY DAY   Patient taking differently: Take 40 mg by mouth every day.   lisinopril-hydrochlorothiazide (PRINZIDE, ZESTORETIC) 20-12.5 MG per tablet 2/5/2023 at am  No No   Sig: TAKE ONE TABLET BY MOUTH EVERY DAY   Patient taking differently: Take 1 Tablet by mouth every day.   metformin (GLUCOPHAGE) 1000 MG tablet 2/5/2023 at am  No No   Sig: TAKE ONE TABLET BY MOUTH TWICE DAILY WITH MEALS   Patient taking differently: Take 1,000 mg by mouth 2 times a day with meals.   metoprolol SR (TOPROL XL) 50 MG TABLET SR 24 HR 2/5/2023 at am  No No   Sig: TAKE ONE TABLET BY MOUTH EVERY DAY   Patient taking differently: Take 50 mg by mouth every day.   nitroglycerin (NITROSTAT) 0.4 MG SL Tab unk at unk  Yes No   Sig: Place 0.4 mg under tongue every 5 minutes as needed for Chest Pain.      Facility-Administered Medications: None       Physical Exam  Temp:  [36.1 °C (97 °F)-37.2 °C (99 °F)] 37.2 °C (99 °F)  Pulse:  [64-78] 70  Resp:  [16-20] 18  BP: (105-131)/(61-72) 113/70  SpO2:  [90 %-99 %] 96 %  Blood Pressure : 125/72   Temperature: 36.1 °C (97 °F)   Pulse: 73   Respiration: 18   Pulse Oximetry: 94 %       Physical Exam  Vitals and nursing note reviewed.   Constitutional:       General: He is not in acute distress.     Appearance: Normal appearance. He is normal weight. He is ill-appearing. He is not toxic-appearing or diaphoretic.   HENT:      Head: Normocephalic and atraumatic.      Mouth/Throat:      Mouth: Mucous membranes are moist.      Pharynx: Oropharynx is clear. No oropharyngeal exudate or posterior oropharyngeal erythema.   Cardiovascular:      Rate and  Rhythm: Normal rate and regular rhythm.      Pulses: Normal pulses.      Heart sounds: Normal heart sounds. No murmur heard.    No friction rub. No gallop.   Pulmonary:      Effort: Pulmonary effort is normal. No respiratory distress.      Breath sounds: Normal breath sounds. No stridor. No wheezing, rhonchi or rales.   Abdominal:      General: Abdomen is flat. Bowel sounds are normal. There is no distension.      Palpations: Abdomen is soft. There is no mass.      Tenderness: There is abdominal tenderness. There is guarding. There is no right CVA tenderness, left CVA tenderness or rebound.      Hernia: No hernia is present.      Comments: LLQ pain with palpation.    Genitourinary:     Penis: Normal.       Testes: Normal.   Musculoskeletal:         General: No tenderness, deformity or signs of injury.      Right lower leg: No edema.      Left lower leg: No edema.   Skin:     Capillary Refill: Capillary refill takes less than 2 seconds.      Coloration: Skin is not jaundiced.      Findings: No bruising, lesion or rash.   Neurological:      Mental Status: He is alert and oriented to person, place, and time.      Motor: Weakness present.      Gait: Gait normal.      Deep Tendon Reflexes: Reflexes normal.      Comments: 3/5 strength L hip with flexion. 5/5 strength bilat upper and lower extremities.    Psychiatric:         Mood and Affect: Mood normal.         Thought Content: Thought content normal.         Judgment: Judgment normal.       Laboratory:  Recent Labs     02/05/23  1016   WBC 13.4*   RBC 4.02*   HEMOGLOBIN 11.7*   HEMATOCRIT 35.6*   MCV 88.6   MCH 29.1   MCHC 32.9*   RDW 42.5   PLATELETCT 414   MPV 9.1     Recent Labs     02/05/23  1016   SODIUM 137   POTASSIUM 3.1*   CHLORIDE 97   CO2 27   GLUCOSE 107*   BUN 17   CREATININE 0.94   CALCIUM 8.9     Recent Labs     02/05/23  1016   ALTSGPT 28   ASTSGOT 22   ALKPHOSPHAT 91   TBILIRUBIN 0.5   LIPASE 194*   GLUCOSE 107*         No results for input(s): NTPROBNP  in the last 72 hours.      No results for input(s): TROPONINT in the last 72 hours.    Imaging:  CT-ABDOMEN-PELVIS WITH   Final Result      1.  There is a nonspecific hypodense mass versus complex fluid collection in the left medial hemipelvis abutting the left iliopsoas musculature just medial to the acetabulum which extends into the left inguinal region. The overall appearance on CT is    more suggestive of an infectious or inflammatory process rather than necrotic metastatic disease in this patient with known prostate cancer. This does not involve the left hip joint.   2.  There is otherwise no metastatic adenopathy in the abdomen or pelvis.   3.  Incidental simple left renal cortical cyst. No follow up imaging is recommended per consensus guidelines of the 2019 ACR Incidental Findings Committee for probably benign incidental simple appearing renal cystic lesion(s) based on imaging criteria.          CT-ABDOMEN-PELVIS WITH    Result Date: 2/5/2023  1.  There is a nonspecific hypodense mass versus complex fluid collection in the left medial hemipelvis abutting the left iliopsoas musculature just medial to the acetabulum which extends into the left inguinal region. The overall appearance on CT is more suggestive of an infectious or inflammatory process rather than necrotic metastatic disease in this patient with known prostate cancer. This does not involve the left hip joint. 2.  There is otherwise no metastatic adenopathy in the abdomen or pelvis. 3.  Incidental simple left renal cortical cyst. No follow up imaging is recommended per consensus guidelines of the 2019 ACR Incidental Findings Committee for probably benign incidental simple appearing renal cystic lesion(s) based on imaging criteria.       Assessment/Plan:  Problem Representation:   I anticipate this patient will require at least two midnights for appropriate medical management, necessitating inpatient admission because of possible psoas  abscess    Patient will need a Telemetry bed on MEDICAL service .  The need is secondary to possible psoas abscess and need for IV antibiotics.    * Psoas abscess (HCC)- (present on admission)  Assessment & Plan  Ddx secondary to diverticulitis, UTI, or hematogenous or lymphatic seeding from a distant site (risk factor of diabetes)   -CT A/P 2/5 noted nonspecific hypodense mass vs complex fluid collection in L medial hemipelvis abutting L iliopsoas.   -Colonoscopy 2018 noted few colonic diverticuli. Possible source.   Plan:   -Will restart Ceftriaxone and Metronidazole after IR consult.   -PRN 2.5- 5 Oxycodone (Pt initially declined opiates but agreed to try low dose)   -Blood cx pending   -Consult IR     Normocytic anemia  Assessment & Plan  -On admission Hgb 11.7. Only other comparison lab available is from 4/20/2021 and was also low at 12.1   Plan:   -Iron panel and ferritin in AM.     Groin pain, left  Assessment & Plan  -possible hernia, however pt unable to stand so cannot do accurate hernia exam at this time. Will monitor for now.     Chronic right shoulder pain  Assessment & Plan  -PRN diclofenac gel  -Scheduled lidocaine patches     Elevated lipase  Assessment & Plan  -Pt does not meet pancreatitis criteria, will monitor for now.     Hypokalemia   Assessment & Plan  -40meq kcl x2 doses.     Benign essential HTN- (present on admission)  Assessment & Plan  -Prinzide not available, ordered seperately as Lisinopril 20 and HCTZ 12.5   -Continue home metoprolol XL 50     Controlled type 2 diabetes mellitus without complication, without long-term current use of insulin (HCC)- (present on admission)  Assessment & Plan  -home meds: metformin 1000 and Pioglitazone 30.   Plan:   -Holding home meds   -ISS   -Hypoglycemia protocol     Mixed hyperlipidemia- (present on admission)  Assessment & Plan  -continue home atorvastatin     History of prostate cancer  Assessment & Plan  -Adenocarcinoma of prostate. S/P RA RP and  best PLND on 4/19/2021.     VTE prophylaxis: heparin ppx

## 2023-02-05 NOTE — ASSESSMENT & PLAN NOTE
-New A1c 10.3  -home meds: metformin 1000 and Pioglitazone 30.   Plan:   -Holding home meds   -ISS   -Glargine 7 units qam  -Hypoglycemia protocol

## 2023-02-05 NOTE — PROGRESS NOTES
4 Eyes Skin Assessment Completed by JESSIE Paniagua and JESSIE Maldonado.    Head WDL  Ears WDL  Nose WDL  Mouth WDL  Neck WDL  Breast/Chest WDL  Shoulder Blades WDL  Spine WDL  (R) Arm/Elbow/Hand WDL  (L) Arm/Elbow/Hand Scab  Abdomen WDL  Groin WDL  Scrotum/Coccyx/Buttocks WDL  (R) Leg WDL  (L) Leg WDL  (R) Heel/Foot/Toe WDL  (L) Heel/Foot/Toe WDL          Devices In Places Tele Box, Blood Pressure Cuff, and Pulse Ox      Interventions In Place N/A    Possible Skin Injury No    Pictures Uploaded Into Epic N/A  Wound Consult Placed N/A  RN Wound Prevention Protocol Ordered No

## 2023-02-05 NOTE — ED NOTES
Patient sitting comfortably in bed. Wife at bedside. Denies needs at this time. Call light within reach. Updated on plan of care.

## 2023-02-05 NOTE — ED TRIAGE NOTES
"Chief Complaint   Patient presents with    Groin Pain     L groin x 10 days, denies pain with urination but pt has decreased sensation s/t radiation tx for prostate cancer 1 year ago, denies hematuria     Loss of Appetite     X 2 weeks, states he has felt ill and had chills/tremors two weeks ago    Hip Pain     L posterior hip X 2 weeks, pt states he has been staying in bed and sleeping mostly for 2 weeks, believes his hip hurts from laying on it excessively, pain keeps pt from moving LLE well    Constipation     LBM 3 days ago but states it was small, pt is taking stool softeners      Pt to triage with family in  for above complaints, VSS on RA, GCS 15, NAD. Pt was seen at [facility] in [place] for the same complaints but was not given specific dx or imaging.     Pt returned to [facility]. Educated on triage process and to inform staff of any changes.     /65   Pulse 78   Temp 36.1 °C (97 °F) (Temporal)   Resp 16   Ht 1.803 m (5' 11\")   Wt 86.2 kg (190 lb)   SpO2 99%   BMI 26.50 kg/m²     "

## 2023-02-05 NOTE — ASSESSMENT & PLAN NOTE
-Likely related to abscess. However, possible hernia; pt unable to stand so cannot do accurate hernia exam at this time. Will monitor for now.

## 2023-02-05 NOTE — ED NOTES
Urine sent to lab. Medications administered per MAR. Call light within reach. Wife at bedside.    mother

## 2023-02-05 NOTE — NON-PROVIDER
This note is for learning purposes only and not for billing. Note written by Joao Leonardo, Medical Student    Daily Progress Note:   Note Author: Joao Leonardo, Student  Date: 2/5/2023    Date of Admission: 2/5/2023  Attending: Dr. Jose Cruz Obando  Medical Student: Joao Leonardo, MS3    Reason for interval visit  (Principal Problem)   Psoas abscess (HCC) left groin pain, left hip pain, left leg weakness, loss of appetite, constipation    Chief Complaint:   Srinivasan Cruz is a 67 y.o. male with a PMH of prostate cancer, DM2, hyperlipidemia, and HTN who was admitted on 2/5/2023 with left groin pain, left hip pain, loss of appetite, and constipation with suspected ilopsoas abscess.    Subjective:  The patient presents with his wife, Carmen. The patient had a robotic prostatectomy in 4/2021 for prostate cancer that he has never really fully recovered from mentally and physically. For the past two weeks, he has had increasing fatigue, fever, chills, and associated left groin and hip pain and left leg weakness that is worse on extension. He was seen at a [place] clinic and given pain medication for sciatica that haven't helped. He has had less urinary and bowel output the past two weeks and his last BM was three days ago.     Consultants/Specialty:  Surgery.  Interventional Radiology.    Review of Systems:    ROS  See HPI.    Objective Data:    Vitals:   Temp:  [36.1 °C (97 °F)] 36.1 °C (97 °F)  Pulse:  [64-78] 73  Resp:  [16-20] 18  BP: (105-131)/(61-72) 125/72  SpO2:  [90 %-99 %] 94 %RA    Intake/Output Summary (Last 24 hours) at 2/5/2023 1426  Last data filed at 2/5/2023 1402  Gross per 24 hour   Intake 1100 ml   Output --   Net 1100 ml       Vitals have been reviewed and are accurate.    Physical Exam:  General: Well appearing in no acute distress, resting on arrival to room  HEENT: Normocephalic, atraumatic, normal thyroid  Cardiovascular: RRR, no murmurs, gallops, or rubs  Pulmonary:  CTAB, symmetrical chest expansion, no rales, rhonchi, or wheezes  Abdominal: Normoactive bowel sounds, LLQ tenderness, mild distension, no masses, no signs of trauma, erythema, or rash. No CVA tenderness.  Extremities: Moves all spontaneously, no peripheral edema, radial and pedal pulses 2+ bilateral.   Neurological: Alert and oriented, CN III-XII intact. 3/5 left hip pain limited due to pain, 5/5 left plantar flexion. Normal sensory exams.    Lab Results:  Recent Labs     02/05/23  1016   WBC 13.4*   RBC 4.02*   HEMOGLOBIN 11.7*   HEMATOCRIT 35.6*   MCV 88.6   MCH 29.1   RDW 42.5   PLATELETCT 414   MPV 9.1   NEUTSPOLYS 82.00*   LYMPHOCYTES 8.50*   MONOCYTES 8.20   EOSINOPHILS 0.70   BASOPHILS 0.20     Recent Labs     02/05/23  1016   SODIUM 137   POTASSIUM 3.1*   CHLORIDE 97   CO2 27   BUN 17   CREATININE 0.94   CALCIUM 8.9   ALBUMIN 3.3     Estimated GFR/CRCL = Estimated Creatinine Clearance: 80.1 mL/min (by C-G formula based on SCr of 0.94 mg/dL).  Recent Labs     02/05/23  1016   GLUCOSE 107*     Recent Labs     02/05/23  1016   ASTSGOT 22   ALTSGPT 28   TBILIRUBIN 0.5   ALKPHOSPHAT 91   GLOBULIN 3.6*             No results for input(s): INR, APTT, FIBRINOGEN in the last 72 hours.    Invalid input(s): DIMER    Labs have been reviewed and are accurate.    Microbiology Results:  Results       Procedure Component Value Units Date/Time    Blood Culture,Hold [[id number]] Collected: 02/05/23 1016    Order Status: Completed Updated: 02/05/23 1303     Blood Culture Hold Collected    URINALYSIS (UA) [[id number]] Collected: 02/05/23 1205    Order Status: Completed Specimen: Urine Updated: 02/05/23 1219     Color Yellow     Character Clear     Specific Gravity 1.023     Ph 5.0     Glucose Negative mg/dL      Ketones Negative mg/dL      Protein Negative mg/dL      Bilirubin Negative     Urobilinogen, Urine 1.0     Nitrite Negative     Leukocyte Esterase Negative     Occult Blood Negative     Micro Urine Req see below      Comment: Microscopic examination not performed when specimen is clear  and chemically negative for protein, blood, leukocyte esterase  and nitrite.                 Imaging Results:  CT-ABDOMEN-PELVIS WITH   Final Result      1.  There is a nonspecific hypodense mass versus complex fluid collection in the left medial hemipelvis abutting the left iliopsoas musculature just medial to the acetabulum which extends into the left inguinal region. The overall appearance on CT is    more suggestive of an infectious or inflammatory process rather than necrotic metastatic disease in this patient with known prostate cancer. This does not involve the left hip joint.   2.  There is otherwise no metastatic adenopathy in the abdomen or pelvis.   3.  Incidental simple left renal cortical cyst. No follow up imaging is recommended per consensus guidelines of the 2019 ACR Incidental Findings Committee for probably benign incidental simple appearing renal cystic lesion(s) based on imaging criteria.          Imaging has been reviewed and are accurate.    EKG  No results found for this or any previous visit.    Current Medications    Current Facility-Administered Medications:     senna-docusate (PERICOLACE or SENOKOT S) 8.6-50 MG per tablet 2 Tablet, 2 Tablet, Oral, BID **AND** polyethylene glycol/lytes (MIRALAX) PACKET 1 Packet, 1 Packet, Oral, QDAY PRN **AND** magnesium hydroxide (MILK OF MAGNESIA) suspension 30 mL, 30 mL, Oral, QDAY PRN **AND** bisacodyl (DULCOLAX) suppository 10 mg, 10 mg, Rectal, QDAY PRN, NAV ShannonO.    [Held by provider] heparin injection 5,000 Units, 5,000 Units, Subcutaneous, Q8HRS, NAV ShannonO.    acetaminophen (Tylenol) tablet 650 mg, 650 mg, Oral, Q6HRS PRN, Hero Magallanes D.O.    insulin regular (HumuLIN R,NovoLIN R) injection, 1-6 Units, Subcutaneous, Q6HRS **AND** POC blood glucose manual result, , , Q6H **AND** NOTIFY MD and PharmD, , , Once **AND** Administer 20 grams of glucose  (approximately 8 ounces of fruit juice) every 15 minutes PRN FSBG less than 70 mg/dL, , , PRN **AND** dextrose 10 % BOLUS 25 g, 25 g, Intravenous, Q15 MIN PRN, Hero Magallanes D.O.    atorvastatin (LIPITOR) tablet 40 mg, 40 mg, Oral, Q EVENING, Hero Magallanes D.O.    lisinopril (PRINIVIL) tablet 20 mg, 20 mg, Oral, Q DAY, Hero Magallanes D.O.    hydroCHLOROthiazide (HYDRODIURIL) tablet 12.5 mg, 12.5 mg, Oral, Q DAY, Hero Magallanes D.O.    metoprolol SR (TOPROL XL) tablet 50 mg, 50 mg, Oral, Q DAY, Hero Magallanes D.O.    Current Outpatient Medications:     Insulin Degludec (TRESIBA FLEXTOUCH) 200 UNIT/ML Solution Pen-injector, Inject 60 Units under the skin at bedtime., Disp: , Rfl:     lisinopril-hydrochlorothiazide (PRINZIDE, ZESTORETIC) 20-12.5 MG per tablet, TAKE ONE TABLET BY MOUTH EVERY DAY (Patient taking differently: Take 1 Tablet by mouth every day.), Disp: 90 Tab, Rfl: 1    atorvastatin (LIPITOR) 40 MG Tab, TAKE ONE TABLET BY MOUTH EVERY DAY (Patient taking differently: Take 40 mg by mouth every day.), Disp: 90 Tab, Rfl: 1    metformin (GLUCOPHAGE) 1000 MG tablet, TAKE ONE TABLET BY MOUTH TWICE DAILY WITH MEALS (Patient taking differently: Take 1,000 mg by mouth 2 times a day with meals.), Disp: 180 Tab, Rfl: 2    metoprolol SR (TOPROL XL) 50 MG TABLET SR 24 HR, TAKE ONE TABLET BY MOUTH EVERY DAY (Patient taking differently: Take 50 mg by mouth every day.), Disp: 90 Tab, Rfl: 2    aspirin (ASA) 81 MG Chew Tab chewable tablet, Take 81 mg by mouth every day., Disp: , Rfl:     nitroglycerin (NITROSTAT) 0.4 MG SL Tab, Place 0.4 mg under tongue every 5 minutes as needed for Chest Pain., Disp: , Rfl:     Problem Representation:   Srinivasan Cruz is a 67 y.o. male with a PMH of prostate cancer, DM2, hyperlipidemia, and HTN who was admitted on 2/5/2023 with left groin pain, left hip pain, loss of appetite, and constipation with suspected ilopsoas abscess. He has had two weeks of worsening fatigue,  fever, chills, with associated left groin and hip pain and weakness. On physical exam, there are no signs of trauma but he does have LLQ abdominal tenderness with mild guarding and distension and he is weak in the left hip. Initial abdominal CT showed nonspecific hypondense mass vs. Complex fluid collection in the left pelvic/psoas region with no metastatic adenopathy. Urinalysis was normal.    #Iliopsoas Abscess:   -Unknown etiology. Colonoscopy in 2018 showed diverticulosis but patient has been asymptomatic. Also had robotic prostatectomy in 2021 with no signs of metastasis. Likely diverticulosis.   -Interventional Radiology consult for drainage.   -Hold antibiotics until procedure completion and culture complete.   -Tylenol and oxycodone prn for pain.   -Possible surgery consult if worsening.   -CTM for worsening symptoms/sepsis.    #DM2:   -Obtain A1C.   -Continue home meds.    #Anemia:   -2/5, H&H: 11.7/35.6   -CTM    #Hyperlipidemia:   -Continue home Atorvastatin.    #Hypokalemia:   -2/5, potassium 3.1   -Give supplementation.   -CTM.    VTE Prophylaxis:  Holding.    Disposition:  Inpatient.

## 2023-02-05 NOTE — ASSESSMENT & PLAN NOTE
-On admission Hgb 11.7. Only other comparison lab available is from 4/20/2021 and was also low at 12.1 .   -anemia of chronic disease. Labs notable for low iron, low tibc, elevated ferritin.

## 2023-02-05 NOTE — ED PROVIDER NOTES
ED physician note    CHIEF COMPLAINT  Chief Complaint   Patient presents with    Groin Pain     L groin x 10 days, denies pain with urination but pt has decreased sensation s/t radiation tx for prostate cancer 1 year ago, denies hematuria     Loss of Appetite     X 2 weeks, states he has felt ill and had chills/tremors two weeks ago    Hip Pain     L posterior hip X 2 weeks, pt states he has been staying in bed and sleeping mostly for 2 weeks, believes his hip hurts from laying on it excessively, pain keeps pt from moving LLE well    Constipation     LBM 3 days ago but states it was small, pt is taking stool softeners        EXTERNAL RECORDS REVIEWED  Outpatient Notes Care Everywhere utilized to review medical records from [facility] walk-in clinic.  Medical note is not available, Mr. Cruz was seen 2/2/2023 by Dalton Jaimes, Physician Assistant     OLESYA/MATT    OUTSIDE HISTORIAN(S):  Family wife    Srinivasan Cruz is a 67 y.o. male who presents to the emergency department today for evaluation of left lower quadrant abdominal pain.  His symptoms began about 2 weeks ago, around that time he reported some associated chills, and symptoms consistent with fever.  He also reported some night sweats at that time.  Pain has steadily and progressively worsened since time of onset.  2 days ago they were seen at a walk-in clinic in [place], he was provided medications for sciatica at that time.  His pain has continued to worsen.  Pain is significantly exacerbated by movement, especially with sitting up and twisting.  He does not have any pain localized to the right lower right upper quadrant.  He has had intermittent constipation and diarrhea since time of symptom onset as well.  He has not had any associated nausea or vomiting.  Past surgical history is significant for prostate surgery about a year and a half ago.  He does have incontinence at baseline, however has been making less urine over the past 2  "weeks as well.  His wife is concerned, because she is also noticed that he is urinating less, and going through fewer pads than usual.    PAST MEDICAL HISTORY   has a past medical history of Diabetes (HCC), Hyperlipidemia, and Hypertension.    SURGICAL HISTORY   has a past surgical history that includes uvulopharyngopalatoplasty.    FAMILY HISTORY  Family History   Problem Relation Age of Onset    Arthritis Mother     Osteoporosis Mother     Cancer Father        SOCIAL HISTORY  Social History     Tobacco Use    Smoking status: Never    Smokeless tobacco: Never   Vaping Use    Vaping Use: Never used   Substance and Sexual Activity    Alcohol use: Yes    Drug use: No    Sexual activity: Yes     Partners: Female     Comment: campus        CURRENT MEDICATIONS  Home Medications       Reviewed by Arcelia Saldaña R.N. (Registered Nurse) on 02/05/23 at 1433  Med List Status: Complete     Medication Last Dose Status   aspirin (ASA) 81 MG Chew Tab chewable tablet 2/5/2023 Active   atorvastatin (LIPITOR) 40 MG Tab 2/4/2023 Active   Insulin Degludec (TRESIBA FLEXTOUCH) 200 UNIT/ML Solution Pen-injector 2/4/2023 Active   lisinopril-hydrochlorothiazide (PRINZIDE, ZESTORETIC) 20-12.5 MG per tablet 2/5/2023 Active   metformin (GLUCOPHAGE) 1000 MG tablet 2/5/2023 Active   metoprolol SR (TOPROL XL) 50 MG TABLET SR 24 HR 2/5/2023 Active   nitroglycerin (NITROSTAT) 0.4 MG SL Tab unk Active                    ALLERGIES  Allergies   Allergen Reactions    Seasonal Shortness of Breath       PHYSICAL EXAM  VITAL SIGNS: /54   Pulse 85   Temp 37.1 °C (98.8 °F) (Temporal)   Resp 18   Ht 1.803 m (5' 10.98\")   Wt 84.5 kg (186 lb 4.6 oz)   SpO2 91%   BMI 25.99 kg/m²    Physical Exam:  Vital Signs: /54   Pulse 85   Temp 37.1 °C (98.8 °F) (Temporal)   Resp 18   Ht 1.803 m (5' 10.98\")   Wt 84.5 kg (186 lb 4.6 oz)   SpO2 91%   BMI 25.99 kg/m²   Constitutional: Alert, no acute distress  HEENT: Moist mucus membranes, " no intraoral lesions, normal conjunctiva, no periorbital edema  Neck: Supple, normal range of motion, no stridor  Cardiovascular: Extremities are warm and well perfused, no murmur appreciated, normal cardiac auscultation  Pulmonary: No respiratory distress, normal work of breathing, no accessory muscule usage, breath sounds clear and equal bilaterally, no wheezing  Abdomen: Left lower quadrant tenderness to palpation, no pain out of proportion to exam, he does have some voluntary guarding.  Skin: Warm, dry, no rashes or lesions  Musculoskeletal: Normal range of motion in all extremities, no swelling or deformity noted  Neurologic: Alert, normal speech, normal motor function    LABS  All labs reviewed as documented below    RADIOLOGY  I have independently interpreted the following diagnostic imaging:  CT abdomen pelvis -left renal cyst present  CT-ABDOMEN-PELVIS WITH   Final Result      1.  There is a nonspecific hypodense mass versus complex fluid collection in the left medial hemipelvis abutting the left iliopsoas musculature just medial to the acetabulum which extends into the left inguinal region. The overall appearance on CT is    more suggestive of an infectious or inflammatory process rather than necrotic metastatic disease in this patient with known prostate cancer. This does not involve the left hip joint.   2.  There is otherwise no metastatic adenopathy in the abdomen or pelvis.   3.  Incidental simple left renal cortical cyst. No follow up imaging is recommended per consensus guidelines of the 2019 ACR Incidental Findings Committee for probably benign incidental simple appearing renal cystic lesion(s) based on imaging criteria.      CT-DRAIN-PERITONEAL    (Results Pending)         COURSE & MEDICAL DECISION MAKING    INITIAL ASSESSMENT, COURSE AND PLAN  Care Narrative: Mr. Cruz presents to the emergency department today out of concern for progressively worsening pain in the left inguinal region.  He  reports no associated fevers, does have significant tenderness to palpation in that area.  Differential diagnosis includes intra-abdominal infection such as diverticulitis, mass or malignancy, bowel obstruction.  vital signs are reassuring on arrival, though SIRS or sepsis remains in my differential.    On laboratory evaluation White blood count is elevated to 13.4.  Out of concern for intra-abdominal infection, and possibly sepsis, IV antibiotics were administered.  Lipase is elevated to 194.  CMP notable for potassium of 3.1, otherwise no significant electrolyte abnormalities.  Urinalysis is negative for evidence of infection.    CT of the abdomen and pelvis demonstrates a mass or fluid collection in the left medial hemipelvis in the area of his pain and tenderness.  Radiology report notes appearance is more suggestive of infection or inflammatory process rather than necrotic metastatic disease.    I reviewed the CT imaging with Dr. Herron, general surgeon on-call today.  States that this is not a mass that necessitates immediate surgery, he believes biopsy or drain could be placed by interventional radiology.  Plan at this time is for admission to hospitalist service with plan for IR consultation in the morning for biopsy or drain.      DISPOSITION AND DISCUSSIONS  I have discussed management of the patient with the following physicians:  1.  Dr. Herron, general surgeon  2.  Admitting hospitalist    FINAL DIAGNOSIS  1. Left lower quadrant abdominal pain    2. Pelvic mass    3. Controlled type 2 diabetes mellitus without complication, without long-term current use of insulin (HCC)           Electronically signed by: Faiza Remy M.D.

## 2023-02-05 NOTE — ED NOTES
Patient to room from lobby via wheelchair, connected to monitor. Agree with triage note.Charted for ERP.  Wife Carmen at bedside. Call light within reach.

## 2023-02-05 NOTE — ASSESSMENT & PLAN NOTE
Ddx secondary to diverticulitis, UTI, or hematogenous or lymphatic seeding from a distant site (risk factor of diabetes)   -CT A/P 2/5 noted nonspecific hypodense mass vs complex fluid collection in L medial hemipelvis abutting L iliopsoas.   -Colonoscopy 2018 noted few colonic diverticuli. Possible source.   -2/7 ordered lactic acid to assess for bowel ischemia, lactic acid WNL.   Plan:   -zosyn.  -PRN 5-7.5 Oxycodone   -IR drain placed on 2/8, draining purulent fluid.  Growing group B strep pending speciation and sensitivities  -will repeat imaging based on drainage output

## 2023-02-05 NOTE — SENIOR ADMIT NOTE
[facility] Senior Admission Note  Please refer to Intern H&P for more details      HPI  Srinivasan Cruz is a 67 y.o. male with a past medical history that includes  prostate cancer s/p radical prostatectomy with lymph node dissection on 4/2021, diverticulosis, PEDRO, CAD, DM, HTN DLD who presented on 2/4/2023 with about 2 weeks of abdominal pain.  Left sided, crampy type pain, worse with extension of left leg and standing up straight.  Feels left sided abdominal swelling.  Intermittent testicular pain.  Reports fevers, chills, diarrhea, night sweats.  Has had difficulty ambulating due to pain.  Pt received hormone injection treatments which ended 1 year ago for his prostate cancer.  No hx of IVDU.      In ED VSS.  Lab showing leukocytosis.  CT abdomen/pelvis showing a large mass, difficult to ascertain whether it is fluid collection or solid mass.  General surgery saw patient in ED, no acute need for surgery.  Was given Ceftriaxone and metronidazole in the ED.      Physical Exam  Physical Exam  Vitals and nursing note reviewed.   Constitutional:       General: He is not in acute distress.     Appearance: He is not ill-appearing or toxic-appearing.   HENT:      Head: Normocephalic and atraumatic.      Mouth/Throat:      Mouth: Mucous membranes are moist.      Pharynx: Oropharynx is clear.   Eyes:      Conjunctiva/sclera: Conjunctivae normal.   Cardiovascular:      Rate and Rhythm: Normal rate and regular rhythm.      Heart sounds: No murmur heard.    No friction rub. No gallop.   Pulmonary:      Effort: Pulmonary effort is normal. No respiratory distress.      Breath sounds: Normal breath sounds. No wheezing, rhonchi or rales.   Abdominal:      General: Abdomen is flat. Bowel sounds are normal. There is distension (left abdomen appears slightly enlarged).      Palpations: Abdomen is soft.      Tenderness: There is abdominal tenderness (tenderness to palpation in LLQ.).   Neurological:      Mental Status: He is  alert.       Assessment/Plan  Srinivasan Cruz is a 67 y.o. male with a past medical history that includes  prostate cancer s/p radical prostatectomy with lymph node dissection on 4/2021, diverticulosis, PEDRO, CAD, DM, HTN, DLD who presented on 2/4/2023 with about 2 weeks of abdominal pain.  Left sided, crampy type pain, worse with extension of left leg and standing up straight.  Concerns for ileopsoas abscess, unknown source at this time.    #Intra-abdominal abscess  Etiology possibly due to diverticulosis (seen on colonoscopy in 2018), vs seeding from other infection (UTI?), although this seems less likely as symptoms appear more acute than chronic or subacute.  -hold antibiotics until we are able to obtain sample  -will discuss case with IR  -gen surg consulted down in ED  -pain control  -blood cultures pending  -A1c    For more detailed assessment and plan, please refer to Intern H&P    Discussed with my attending physician, Jose Cruz Obando M.D..

## 2023-02-05 NOTE — ED NOTES
Arcelia ROMAN at bedside, placing patient on [facility], transporting patient to T724. Belongings and chart sent with patient. Patient care transferred.

## 2023-02-06 LAB
ANION GAP SERPL CALC-SCNC: 10 MMOL/L (ref 7–16)
BUN SERPL-MCNC: 13 MG/DL (ref 8–22)
CALCIUM SERPL-MCNC: 8.4 MG/DL (ref 8.5–10.5)
CHLORIDE SERPL-SCNC: 98 MMOL/L (ref 96–112)
CO2 SERPL-SCNC: 27 MMOL/L (ref 20–33)
CREAT SERPL-MCNC: 0.84 MG/DL (ref 0.5–1.4)
ERYTHROCYTE [DISTWIDTH] IN BLOOD BY AUTOMATED COUNT: 42.1 FL (ref 35.9–50)
EST. AVERAGE GLUCOSE BLD GHB EST-MCNC: 249 MG/DL
FERRITIN SERPL-MCNC: 767 NG/ML (ref 22–322)
GFR SERPLBLD CREATININE-BSD FMLA CKD-EPI: 95 ML/MIN/1.73 M 2
GLUCOSE BLD STRIP.AUTO-MCNC: 111 MG/DL (ref 65–99)
GLUCOSE BLD STRIP.AUTO-MCNC: 151 MG/DL (ref 65–99)
GLUCOSE BLD STRIP.AUTO-MCNC: 76 MG/DL (ref 65–99)
GLUCOSE BLD STRIP.AUTO-MCNC: 93 MG/DL (ref 65–99)
GLUCOSE SERPL-MCNC: 131 MG/DL (ref 65–99)
HBA1C MFR BLD: 10.3 % (ref 4–5.6)
HCT VFR BLD AUTO: 33 % (ref 42–52)
HGB BLD-MCNC: 10.9 G/DL (ref 14–18)
HGB RETIC QN AUTO: 29.1 PG/CELL (ref 29–35)
IMM RETICS NFR: 14.5 % (ref 9.3–17.4)
INR PPP: 1.21 (ref 0.87–1.13)
IRON SATN MFR SERPL: 10 % (ref 15–55)
IRON SERPL-MCNC: 18 UG/DL (ref 50–180)
MCH RBC QN AUTO: 29.1 PG (ref 27–33)
MCHC RBC AUTO-ENTMCNC: 33 G/DL (ref 33.7–35.3)
MCV RBC AUTO: 88 FL (ref 81.4–97.8)
PLATELET # BLD AUTO: 414 K/UL (ref 164–446)
PMV BLD AUTO: 9.5 FL (ref 9–12.9)
POTASSIUM SERPL-SCNC: 3.6 MMOL/L (ref 3.6–5.5)
PROTHROMBIN TIME: 15.1 SEC (ref 12–14.6)
RBC # BLD AUTO: 3.75 M/UL (ref 4.7–6.1)
RETICS # AUTO: 0.04 M/UL (ref 0.04–0.06)
RETICS/RBC NFR: 1 % (ref 0.8–2.1)
SODIUM SERPL-SCNC: 135 MMOL/L (ref 135–145)
TIBC SERPL-MCNC: 177 UG/DL (ref 250–450)
UIBC SERPL-MCNC: 159 UG/DL (ref 110–370)
WBC # BLD AUTO: 13 K/UL (ref 4.8–10.8)

## 2023-02-06 PROCEDURE — 83036 HEMOGLOBIN GLYCOSYLATED A1C: CPT

## 2023-02-06 PROCEDURE — A9270 NON-COVERED ITEM OR SERVICE: HCPCS

## 2023-02-06 PROCEDURE — 36415 COLL VENOUS BLD VENIPUNCTURE: CPT

## 2023-02-06 PROCEDURE — 85027 COMPLETE CBC AUTOMATED: CPT

## 2023-02-06 PROCEDURE — 99222 1ST HOSP IP/OBS MODERATE 55: CPT | Performed by: INTERNAL MEDICINE

## 2023-02-06 PROCEDURE — 83540 ASSAY OF IRON: CPT

## 2023-02-06 PROCEDURE — 700101 HCHG RX REV CODE 250: Performed by: STUDENT IN AN ORGANIZED HEALTH CARE EDUCATION/TRAINING PROGRAM

## 2023-02-06 PROCEDURE — 80048 BASIC METABOLIC PNL TOTAL CA: CPT

## 2023-02-06 PROCEDURE — 700111 HCHG RX REV CODE 636 W/ 250 OVERRIDE (IP): Performed by: STUDENT IN AN ORGANIZED HEALTH CARE EDUCATION/TRAINING PROGRAM

## 2023-02-06 PROCEDURE — 85046 RETICYTE/HGB CONCENTRATE: CPT

## 2023-02-06 PROCEDURE — 99232 SBSQ HOSP IP/OBS MODERATE 35: CPT | Performed by: INTERNAL MEDICINE

## 2023-02-06 PROCEDURE — 700102 HCHG RX REV CODE 250 W/ 637 OVERRIDE(OP)

## 2023-02-06 PROCEDURE — 700105 HCHG RX REV CODE 258

## 2023-02-06 PROCEDURE — 85610 PROTHROMBIN TIME: CPT

## 2023-02-06 PROCEDURE — 82728 ASSAY OF FERRITIN: CPT

## 2023-02-06 PROCEDURE — 770001 HCHG ROOM/CARE - MED/SURG/GYN PRIV*

## 2023-02-06 PROCEDURE — 82962 GLUCOSE BLOOD TEST: CPT

## 2023-02-06 PROCEDURE — 83550 IRON BINDING TEST: CPT

## 2023-02-06 RX ORDER — SODIUM CHLORIDE, SODIUM LACTATE, POTASSIUM CHLORIDE, CALCIUM CHLORIDE 600; 310; 30; 20 MG/100ML; MG/100ML; MG/100ML; MG/100ML
INJECTION, SOLUTION INTRAVENOUS CONTINUOUS
Status: ACTIVE | OUTPATIENT
Start: 2023-02-06 | End: 2023-02-07

## 2023-02-06 RX ORDER — METRONIDAZOLE 500 MG/100ML
500 INJECTION, SOLUTION INTRAVENOUS EVERY 8 HOURS
Status: DISCONTINUED | OUTPATIENT
Start: 2023-02-06 | End: 2023-02-08

## 2023-02-06 RX ADMIN — METRONIDAZOLE 500 MG: 5 INJECTION, SOLUTION INTRAVENOUS at 23:47

## 2023-02-06 RX ADMIN — OXYCODONE HYDROCHLORIDE 5 MG: 5 TABLET ORAL at 15:47

## 2023-02-06 RX ADMIN — SIMETHICONE 125 MG: 125 TABLET, CHEWABLE ORAL at 08:35

## 2023-02-06 RX ADMIN — SODIUM CHLORIDE, POTASSIUM CHLORIDE, SODIUM LACTATE AND CALCIUM CHLORIDE: 600; 310; 30; 20 INJECTION, SOLUTION INTRAVENOUS at 11:58

## 2023-02-06 RX ADMIN — METOPROLOL SUCCINATE 50 MG: 50 TABLET, EXTENDED RELEASE ORAL at 06:25

## 2023-02-06 RX ADMIN — SENNOSIDES AND DOCUSATE SODIUM 2 TABLET: 50; 8.6 TABLET ORAL at 17:52

## 2023-02-06 RX ADMIN — OXYCODONE HYDROCHLORIDE 5 MG: 5 TABLET ORAL at 08:35

## 2023-02-06 RX ADMIN — LISINOPRIL 20 MG: 20 TABLET ORAL at 06:25

## 2023-02-06 RX ADMIN — METRONIDAZOLE 500 MG: 5 INJECTION, SOLUTION INTRAVENOUS at 18:08

## 2023-02-06 RX ADMIN — CEFTRIAXONE SODIUM 2000 MG: 10 INJECTION, POWDER, FOR SOLUTION INTRAVENOUS at 18:49

## 2023-02-06 RX ADMIN — ATORVASTATIN CALCIUM 40 MG: 40 TABLET, FILM COATED ORAL at 17:53

## 2023-02-06 RX ADMIN — SIMETHICONE 125 MG: 125 TABLET, CHEWABLE ORAL at 18:50

## 2023-02-06 RX ADMIN — OXYCODONE HYDROCHLORIDE 5 MG: 5 TABLET ORAL at 23:53

## 2023-02-06 RX ADMIN — SENNOSIDES AND DOCUSATE SODIUM 2 TABLET: 50; 8.6 TABLET ORAL at 06:25

## 2023-02-06 RX ADMIN — POTASSIUM CHLORIDE 40 MEQ: 1500 TABLET, EXTENDED RELEASE ORAL at 06:25

## 2023-02-06 RX ADMIN — ACETAMINOPHEN 650 MG: 325 TABLET, FILM COATED ORAL at 02:55

## 2023-02-06 RX ADMIN — INSULIN HUMAN 1 UNITS: 100 INJECTION, SOLUTION PARENTERAL at 00:01

## 2023-02-06 RX ADMIN — OXYCODONE HYDROCHLORIDE 5 MG: 5 TABLET ORAL at 19:47

## 2023-02-06 RX ADMIN — POLYETHYLENE GLYCOL 3350 1 PACKET: 17 POWDER, FOR SOLUTION ORAL at 17:53

## 2023-02-06 RX ADMIN — HYDROCHLOROTHIAZIDE 12.5 MG: 25 TABLET ORAL at 06:24

## 2023-02-06 ASSESSMENT — FIBROSIS 4 INDEX: FIB4 SCORE: 0.68

## 2023-02-06 ASSESSMENT — PAIN DESCRIPTION - PAIN TYPE
TYPE: ACUTE PAIN
TYPE: ACUTE PAIN

## 2023-02-06 NOTE — PROGRESS NOTES
[facility] Internal Medicine Daily Progress Note    Date of Service  2/6/2023    [employer] Team: R IM White Team   Attending: Jose Cruz Obando M.d.  Senior Resident: Dr. Madison  Intern:  Dr. Magallanes  Contact Number: [phone]    Chief Complaint  Srinivasan Cruz is a 67 y.o. male admitted 2/5/2023 with presenting with groin pain, loss of appetite, hip pain and constipation.    Hospital Course  Srinivasan Cruz is a 67 y.o. male with PMH of type 2 diabetes, HLD, and HTN who presented 2/5/2023 with chief complaint of 2 weeks of hip pain. He initially presented to an urgent care in [place] and was prescribed a muscle relaxant for suspected sciatica. When his pain continued he decided to present to the ED. CT abdomen/pelvis noted nonspecific hypodense mass vs complex fluid collection in the L medial hemipelvis abutting L iliopsoas. IR was consulted for possible procedure.       Interval Problem Update  This morning Mr Cruz said he was about the same as yesterday. He did have some pain overnight and said he was happy with his current dose of pain medications. He was hopeful to have procedure done today. No other concerns/complaints.   -Procedure pending.     I have discussed this patient's plan of care and discharge plan at IDT rounds today with Case Management, Nursing, Nursing leadership, and other members of the IDT team.    Consultants/Specialty  none    Code Status  Full Code    Disposition  Patient is not medically cleared for discharge.   Anticipate discharge to to home with close outpatient follow-up.  I have placed the appropriate orders for post-discharge needs.    Review of Systems  Review of Systems   Constitutional:  Positive for malaise/fatigue. Negative for chills, diaphoresis, fever and weight loss.   HENT:  Negative for congestion, sinus pain and sore throat.    Respiratory:  Negative for cough, hemoptysis, sputum production, shortness of breath and wheezing.    Cardiovascular:  Negative for  chest pain, palpitations, orthopnea, claudication, leg swelling and PND.   Gastrointestinal:  Positive for abdominal pain and constipation. Negative for blood in stool, diarrhea, heartburn, melena, nausea and vomiting.   Genitourinary:  Negative for dysuria, flank pain, frequency, hematuria and urgency.   Musculoskeletal:  Positive for back pain, joint pain and myalgias. Negative for falls and neck pain.        L hip pain and R shoulder pain   Skin:  Negative for itching and rash.   Neurological:  Positive for weakness. Negative for dizziness, seizures, loss of consciousness and headaches.      Weakness with flexion L hip     Physical Exam  Temp:  [36.1 °C (97 °F)-38.2 °C (100.8 °F)] 38.2 °C (100.8 °F)  Pulse:  [64-85] 85  Resp:  [16-20] 18  BP: (105-131)/(61-72) 116/67  SpO2:  [90 %-99 %] 91 %    Physical Exam  Vitals and nursing note reviewed.   Constitutional:       General: He is not in acute distress.     Appearance: Normal appearance. He is normal weight. He is ill-appearing. He is not toxic-appearing or diaphoretic.   Cardiovascular:      Rate and Rhythm: Normal rate and regular rhythm.      Pulses: Normal pulses.      Heart sounds: Normal heart sounds. No murmur heard.    No friction rub. No gallop.   Pulmonary:      Effort: Pulmonary effort is normal. No respiratory distress.      Breath sounds: Normal breath sounds. No stridor. No wheezing, rhonchi or rales.   Abdominal:      General: Abdomen is flat. Bowel sounds are normal. There is no distension.      Palpations: Abdomen is soft. There is no mass.      Tenderness: There is abdominal tenderness. There is guarding. There is no right CVA tenderness, left CVA tenderness or rebound.      Hernia: No hernia is present.      Comments: LLQ pain with palpation.    Musculoskeletal:         General: No tenderness, deformity or signs of injury.      Right lower leg: No edema.      Left lower leg: No edema.   Skin:     Capillary Refill: Capillary refill takes less  than 2 seconds.      Coloration: Skin is not jaundiced.      Findings: No bruising, lesion or rash.   Neurological:      Mental Status: He is alert and oriented to person, place, and time.      Motor: Weakness present.      Gait: Gait normal.      Deep Tendon Reflexes: Reflexes normal.      Comments: 3/5 strength L hip with flexion. 5/5 strength bilat upper and lower extremities.      Fluids    Intake/Output Summary (Last 24 hours) at 2/6/2023 0530  Last data filed at 2/5/2023 1402  Gross per 24 hour   Intake 1100 ml   Output --   Net 1100 ml       Laboratory  Recent Labs     02/05/23  1016 02/06/23  0241   WBC 13.4* 13.0*   RBC 4.02* 3.75*   HEMOGLOBIN 11.7* 10.9*   HEMATOCRIT 35.6* 33.0*   MCV 88.6 88.0   MCH 29.1 29.1   MCHC 32.9* 33.0*   RDW 42.5 42.1   PLATELETCT 414 414   MPV 9.1 9.5     Recent Labs     02/05/23  1016 02/06/23  0241   SODIUM 137 135   POTASSIUM 3.1* 3.6   CHLORIDE 97 98   CO2 27 27   GLUCOSE 107* 131*   BUN 17 13   CREATININE 0.94 0.84   CALCIUM 8.9 8.4*                   Imaging  CT-ABDOMEN-PELVIS WITH    Result Date: 2/5/2023  1.  There is a nonspecific hypodense mass versus complex fluid collection in the left medial hemipelvis abutting the left iliopsoas musculature just medial to the acetabulum which extends into the left inguinal region. The overall appearance on CT is more suggestive of an infectious or inflammatory process rather than necrotic metastatic disease in this patient with known prostate cancer. This does not involve the left hip joint. 2.  There is otherwise no metastatic adenopathy in the abdomen or pelvis. 3.  Incidental simple left renal cortical cyst. No follow up imaging is recommended per consensus guidelines of the 2019 ACR Incidental Findings Committee for probably benign incidental simple appearing renal cystic lesion(s) based on imaging criteria.       Assessment/Plan  Problem Representation:    * Psoas abscess (HCC)- (present on admission)  Assessment & Plan  Ddx  secondary to diverticulitis, UTI, or hematogenous or lymphatic seeding from a distant site (risk factor of diabetes)   -CT A/P 2/5 noted nonspecific hypodense mass vs complex fluid collection in L medial hemipelvis abutting L iliopsoas.   -Colonoscopy 2018 noted few colonic diverticuli. Possible source.   Plan:   -Will restart Ceftriaxone and Metronidazole after procedure.    -PRN 2.5- 5 Oxycodone (Pt initially declined opiates but agreed to try low dose)   -Blood cx pending   -IR procedure pending.      Normocytic anemia  Assessment & Plan  -On admission Hgb 11.7. Only other comparison lab available is from 4/20/2021 and was also low at 12.1   -anemia of chronic disease. Labs notable for low iron, low tibc, elevated ferritin.      Groin pain, left  Assessment & Plan  -Likely related to abscess. However, possible hernia, however pt unable to stand so cannot do accurate hernia exam at this time. Will monitor for now.      Chronic right shoulder pain  Assessment & Plan  -PRN diclofenac gel  -Scheduled lidocaine patches      Elevated lipase  Assessment & Plan  -Pt does not meet pancreatitis criteria, will monitor for now.      Hypokalemia   Assessment & Plan  -40meq kcl x2 doses.      Benign essential HTN- (present on admission)  Assessment & Plan  -Prinzide not available, ordered seperately as Lisinopril 20 and HCTZ 12.5   -Continue home metoprolol XL 50      Controlled type 2 diabetes mellitus without complication, without long-term current use of insulin (HCC)- (present on admission)  Assessment & Plan  -home meds: metformin 1000 and Pioglitazone 30.   Plan:   -Holding home meds   -ISS   -Hypoglycemia protocol      Mixed hyperlipidemia- (present on admission)  Assessment & Plan  -continue home atorvastatin      History of prostate cancer  Assessment & Plan  -Adenocarcinoma of prostate. S/P RA RP and bilat PLND on 4/19/2021.      VTE prophylaxis: heparin ppx    I have performed a physical exam and reviewed and  updated ROS and Plan today (2/6/2023). In review of yesterday's note (2/5/2023), there are no changes except as documented above.

## 2023-02-06 NOTE — DIETARY
"Nutrition services: Day 1 of admit.  Srinivasan Cruz is a 67 y.o. male with admitting DX of Psoas abscess     Consult received for unsure weight loss and poor PO intake; MST 3    Met with pt at bedside. Per pt, usual weight is 198 lbs, no weight loss concerns at this time. Pt reports decreased PO intake of 20-30% of normal intake for the past couple of weeks related to severe abdominal pain with constipation and diarrhea. Pt reports eating 100% of 1 meal yesterday which was one of the first since the onset of abdominal pain. Pt is currently NPO for procedure. RD monitoring for diet advancement and will monitor for the need for nutrition interventions.     Assessment:  Height: 180.3 cm (5' 11\")  Weight: 87 kg (191 lb 12.8 oz) via stand up scale   Body mass index is 26.76 kg/m²., BMI classification: Overweight   Diet/Intake: NPO; No meals recorded    Evaluation:   PMH of prostate cancer, DM2, hyperlipidemia, and HTN who was admitted on 2/5/2023 with left groin pain, left hip pain, loss of appetite, and constipation with suspected ilopsoas abscess.  IR procedure pending   Labs: A1c 10.3  Meds: SSI, LR infusion, senna, rocephin, Kcl, kdur  +BM: PTA  Nutrition focused physical exam: Pt appeared ill though nourished.     Malnutrition Risk: Does not meet ASPEN criteria at this time.     Recommendations/Plan:  Diet advancement per MD    Once diet is advanced encourage intake of >50% of meals  Document intake of all meals  as % taken in ADL's to provide interdisciplinary communication across all shifts.   Monitor weight.    RD following.         "

## 2023-02-06 NOTE — NON-PROVIDER
This note is for learning purposes only and not for billing. Note written by Joao Leonardo, Medical Student    Daily Progress Note:   Note Author: Joao Leonardo, Student  Date: 2/6/2023    Date of Admission: 2/5/2023  Attending: Dr. Jose Cruz Obando  Medical Student: Joao Leonardo, MS3    Reason for interval visit  (Principal Problem)   Psoas abscess (HCC)left groin pain, left hip pain, left leg weakness, loss of appetite, constipation    Chief Complaint:   Srinivasan Cruz is a 67 y.o. male with a PMH of prostate cancer, DM2, hyperlipidemia, and HTN who was admitted on 2/5/2023 with left groin pain, left hip pain, loss of appetite, and constipation with suspected ilopsoas abscess.    Subjective:  No acute overnight events. Patient's left groin/hip pain continues to worsen and has had some radiating pain down his left leg. He also had some chills and sweats through out the night but took tylenol which helped. He and his wife had concerns about his IR procedure and his diabetes so I took time to address those concerns. All other ROS is negative. He has no other concerns.    Consultants/Specialty:  Interventional Radiology.    Review of Systems:    ROS  See HPI.    Objective Data:    Vitals:   Temp:  [36.9 °C (98.4 °F)-38.2 °C (100.8 °F)] 37.2 °C (99 °F)  Pulse:  [67-85] 69  Resp:  [16-20] 18  BP: (103-131)/(61-72) 106/67  SpO2:  [90 %-96 %] 91 %RA    Intake/Output Summary (Last 24 hours) at 2/6/2023 1109  Last data filed at 2/5/2023 1402  Gross per 24 hour   Intake 1100 ml   Output --   Net 1100 ml       Vitals have been reviewed and are accurate.    Physical Exam:  General: Ill appearing in no acute distress, resting on arrival to room  HEENT: Normocephalic, atraumatic, normal thyroid  Cardiovascular: RRR, no murmurs, gallops, or rubs  Pulmonary: CTAB, symmetrical chest expansion, no rales, rhonchi, or wheezes  Abdominal: Normoactive bowel sounds, LLQ tenderness, mild guarding, mild  distension, no masses, no signs of trauma, erythema, or rash. No CVA tenderness.  Extremities: Moves all spontaneously, no peripheral edema, radial and pedal pulses 2+ bilateral.   Neurological: Alert and oriented, CN III-XII intact. 3/5 left hip pain limited due to pain not neuropathy, 5/5 left plantar flexion. Normal sensory exams.    Lab Results:  Recent Labs     02/05/23  1016 02/06/23  0241   WBC 13.4* 13.0*   RBC 4.02* 3.75*   HEMOGLOBIN 11.7* 10.9*   HEMATOCRIT 35.6* 33.0*   MCV 88.6 88.0   MCH 29.1 29.1   RDW 42.5 42.1   PLATELETCT 414 414   MPV 9.1 9.5   NEUTSPOLYS 82.00*  --    LYMPHOCYTES 8.50*  --    MONOCYTES 8.20  --    EOSINOPHILS 0.70  --    BASOPHILS 0.20  --      Recent Labs     02/05/23  1016 02/06/23  0241   SODIUM 137 135   POTASSIUM 3.1* 3.6   CHLORIDE 97 98   CO2 27 27   BUN 17 13   CREATININE 0.94 0.84   CALCIUM 8.9 8.4*   ALBUMIN 3.3  --      Estimated GFR/CRCL = Estimated Creatinine Clearance: 96.9 mL/min (by C-G formula based on SCr of 0.84 mg/dL).  Recent Labs     02/05/23  1016 02/06/23  0241   GLUCOSE 107* 131*     Recent Labs     02/05/23  1016 02/06/23  0725   ASTSGOT 22  --    ALTSGPT 28  --    TBILIRUBIN 0.5  --    ALKPHOSPHAT 91  --    GLOBULIN 3.6*  --    INR  --  1.21*             Recent Labs     02/06/23  0725   INR 1.21*       Labs have been reviewed and are accurate.    Microbiology Results:  Results       Procedure Component Value Units Date/Time    Blood Culture,Hold [[id number]] Collected: 02/05/23 1335    Order Status: Completed Updated: 02/05/23 1752     Blood Culture Hold Collected    Blood Culture,Hold [[id number]] Collected: 02/05/23 1016    Order Status: Completed Updated: 02/05/23 1303     Blood Culture Hold Collected    URINALYSIS (UA) [[id number]] Collected: 02/05/23 1205    Order Status: Completed Specimen: Urine Updated: 02/05/23 1219     Color Yellow     Character Clear     Specific Gravity 1.023     Ph 5.0     Glucose Negative mg/dL      Ketones Negative mg/dL       Protein Negative mg/dL      Bilirubin Negative     Urobilinogen, Urine 1.0     Nitrite Negative     Leukocyte Esterase Negative     Occult Blood Negative     Micro Urine Req see below     Comment: Microscopic examination not performed when specimen is clear  and chemically negative for protein, blood, leukocyte esterase  and nitrite.                 Imaging Results:  CT-ABDOMEN-PELVIS WITH   Final Result      1.  There is a nonspecific hypodense mass versus complex fluid collection in the left medial hemipelvis abutting the left iliopsoas musculature just medial to the acetabulum which extends into the left inguinal region. The overall appearance on CT is    more suggestive of an infectious or inflammatory process rather than necrotic metastatic disease in this patient with known prostate cancer. This does not involve the left hip joint.   2.  There is otherwise no metastatic adenopathy in the abdomen or pelvis.   3.  Incidental simple left renal cortical cyst. No follow up imaging is recommended per consensus guidelines of the 2019 ACR Incidental Findings Committee for probably benign incidental simple appearing renal cystic lesion(s) based on imaging criteria.      CT-DRAIN-PERITONEAL    (Results Pending)       Imaging has been reviewed and are accurate.    EKG  No results found for this or any previous visit.    Current Medications    Current Facility-Administered Medications:     lactated ringers infusion, , Intravenous, Continuous, Hero Magallanes D.O.    senna-docusate (PERICOLACE or SENOKOT S) 8.6-50 MG per tablet 2 Tablet, 2 Tablet, Oral, BID, 2 Tablet at 02/06/23 0625 **AND** polyethylene glycol/lytes (MIRALAX) PACKET 1 Packet, 1 Packet, Oral, QDAY PRN **AND** magnesium hydroxide (MILK OF MAGNESIA) suspension 30 mL, 30 mL, Oral, QDAY PRN **AND** bisacodyl (DULCOLAX) suppository 10 mg, 10 mg, Rectal, QDAY PRN, Hero Magallanes D.O.    [Held by provider] heparin injection 5,000 Units, 5,000 Units,  Subcutaneous, Q8HRS, Hero Magallanes D.O.    acetaminophen (Tylenol) tablet 650 mg, 650 mg, Oral, Q6HRS PRN, Hero Magallanes D.O., 650 mg at 02/06/23 0255    insulin regular (HumuLIN R,NovoLIN R) injection, 1-6 Units, Subcutaneous, Q6HRS, 1 Units at 02/06/23 0001 **AND** POC blood glucose manual result, , , Q6H **AND** NOTIFY MD and PharmD, , , Once **AND** Administer 20 grams of glucose (approximately 8 ounces of fruit juice) every 15 minutes PRN FSBG less than 70 mg/dL, , , PRN **AND** dextrose 10 % BOLUS 25 g, 25 g, Intravenous, Q15 MIN PRN, Hero Magallanes D.O.    atorvastatin (LIPITOR) tablet 40 mg, 40 mg, Oral, Q EVENING, Hero Magallanes D.O., 40 mg at 02/05/23 1709    lisinopril (PRINIVIL) tablet 20 mg, 20 mg, Oral, Q DAY, Hero Magallanes D.O., 20 mg at 02/06/23 0625    hydroCHLOROthiazide (HYDRODIURIL) tablet 12.5 mg, 12.5 mg, Oral, Q DAY, Hero Magallanes D.O., 12.5 mg at 02/06/23 0624    metoprolol SR (TOPROL XL) tablet 50 mg, 50 mg, Oral, Q DAY, Hero Magallanes D.O., 50 mg at 02/06/23 0625    diclofenac sodium (Voltaren) 1 % gel 2 g, 2 g, Topical, 4X/DAY PRN, Hero Magallanes D.O.    lidocaine (LIDODERM) 5 % 1 Patch, 1 Patch, Transdermal, Q24HR, Hero Magallanes D.O., 1 Patch at 02/05/23 1450    oxyCODONE immediate-release (ROXICODONE) tablet 2.5 mg, 2.5 mg, Oral, Q4HRS PRN, Hero Magallanes D.O.    oxyCODONE immediate-release (ROXICODONE) tablet 5 mg, 5 mg, Oral, Q4HRS PRN, Hero Magallanes D.O., 5 mg at 02/06/23 0835    simethicone (Mylicon) chewable tablet 125 mg, 125 mg, Oral, TID PRN, Arnaldo Terrazas M.D., 125 mg at 02/06/23 0835    Problem Representation:   Srinivasan Cruz is a 67 y.o. male with a PMH of prostate cancer, DM2, hyperlipidemia, and HTN who was admitted on 2/5/2023 with left groin pain, left hip pain, loss of appetite, and constipation with suspected ilopsoas abscess. He has had two weeks of worsening fatigue, fever, chills, with associated left groin and hip pain and  weakness. On physical exam, there are no signs of trauma but he does have LLQ abdominal tenderness with mild guarding and distension and he is weak in the left hip. Initial abdominal CT showed nonspecific hypondense mass vs. Complex fluid collection in the left pelvic/psoas region with no metastatic adenopathy. Urinalysis was normal.     #Iliopsoas Abscess:              -Unknown etiology. Colonoscopy in 2018 showed diverticulosis but patient has been asymptomatic. Also had robotic prostatectomy in 2021 with no signs of metastasis. Likely diverticulosis.              -Interventional Radiology consult for drainage.              -Hold antibiotics until procedure completion and adjust pending culture.              -Tylenol and oxycodone prn for pain.              -Possible surgery consult if worsening.              -CTM for worsening symptoms/sepsis.     #DM2:              -2/6, A1C: 10.3  -Diabetes medication history unclear due to the patient having tried multiple meds and stopping them abruptly due to financial reasons. Only meds taken at home are metformin and insulin for the past six months. Patient said he is compliant.  -Insulin sliding scale.              -CTM.     #Anemia of Chronic Disease:              -2/5, H&H: 11.7/35.6   -2/6, H&H: 10.9/33.0   -2/6, elevated ferritin, low iron, low TIBC, low % saturation.              -CTM     #Hyperlipidemia:              -Continue home Atorvastatin.     #Hypokalemia:              -2/5, potassium 3.1              -Give supplementation.              -CTM.     VTE Prophylaxis:  Holding.     Disposition:  Inpatient.

## 2023-02-06 NOTE — CARE PLAN
Problem: Knowledge Deficit - Standard  Goal: Patient and family/care givers will demonstrate understanding of plan of care, disease process/condition, diagnostic tests and medications  Outcome: Progressing     Problem: Pain - Standard  Goal: Alleviation of pain or a reduction in pain to the patient’s comfort goal  Outcome: Progressing   The patient is Stable - Low risk of patient condition declining or worsening    Shift Goals  Clinical Goals: pain management, monitor vitals, labs, NPO @midnight  Patient Goals: rest, comfort, pain management, have BM  Family Goals: have a BM    Progress made toward(s) clinical / shift goals:  Pain management overnight. Vitals stable. Patient NPO since midnight for procedure today.     Patient is not progressing towards the following goals:

## 2023-02-06 NOTE — CARE PLAN
The patient is Watcher - Medium risk of patient condition declining or worsening    Shift Goals  Clinical Goals: pain management, monitor BS  Patient Goals: comfort, BM    Progress made toward(s) clinical / shift goals:        Problem: Knowledge Deficit - Standard  Goal: Patient and family/care givers will demonstrate understanding of plan of care, disease process/condition, diagnostic tests and medications  Outcome: Progressing     Problem: Pain - Standard  Goal: Alleviation of pain or a reduction in pain to the patient’s comfort goal  Outcome: Progressing       Patient is not progressing towards the following goals:n/a

## 2023-02-07 LAB
ERYTHROCYTE [DISTWIDTH] IN BLOOD BY AUTOMATED COUNT: 42.7 FL (ref 35.9–50)
GLUCOSE BLD STRIP.AUTO-MCNC: 115 MG/DL (ref 65–99)
GLUCOSE BLD STRIP.AUTO-MCNC: 130 MG/DL (ref 65–99)
GLUCOSE BLD STRIP.AUTO-MCNC: 131 MG/DL (ref 65–99)
GLUCOSE BLD STRIP.AUTO-MCNC: 165 MG/DL (ref 65–99)
GLUCOSE BLD STRIP.AUTO-MCNC: 90 MG/DL (ref 65–99)
HCT VFR BLD AUTO: 31.7 % (ref 42–52)
HGB BLD-MCNC: 10.5 G/DL (ref 14–18)
LACTATE SERPL-SCNC: 1.5 MMOL/L (ref 0.5–2)
MCH RBC QN AUTO: 29 PG (ref 27–33)
MCHC RBC AUTO-ENTMCNC: 33.1 G/DL (ref 33.7–35.3)
MCV RBC AUTO: 87.6 FL (ref 81.4–97.8)
PLATELET # BLD AUTO: 400 K/UL (ref 164–446)
PMV BLD AUTO: 9.2 FL (ref 9–12.9)
RBC # BLD AUTO: 3.62 M/UL (ref 4.7–6.1)
WBC # BLD AUTO: 13.6 K/UL (ref 4.8–10.8)

## 2023-02-07 PROCEDURE — 82962 GLUCOSE BLOOD TEST: CPT

## 2023-02-07 PROCEDURE — 83605 ASSAY OF LACTIC ACID: CPT

## 2023-02-07 PROCEDURE — A9270 NON-COVERED ITEM OR SERVICE: HCPCS

## 2023-02-07 PROCEDURE — 36415 COLL VENOUS BLD VENIPUNCTURE: CPT

## 2023-02-07 PROCEDURE — 700101 HCHG RX REV CODE 250: Performed by: STUDENT IN AN ORGANIZED HEALTH CARE EDUCATION/TRAINING PROGRAM

## 2023-02-07 PROCEDURE — 700102 HCHG RX REV CODE 250 W/ 637 OVERRIDE(OP)

## 2023-02-07 PROCEDURE — 700111 HCHG RX REV CODE 636 W/ 250 OVERRIDE (IP): Performed by: STUDENT IN AN ORGANIZED HEALTH CARE EDUCATION/TRAINING PROGRAM

## 2023-02-07 PROCEDURE — 700105 HCHG RX REV CODE 258

## 2023-02-07 PROCEDURE — 99233 SBSQ HOSP IP/OBS HIGH 50: CPT | Mod: GC | Performed by: INTERNAL MEDICINE

## 2023-02-07 PROCEDURE — 85027 COMPLETE CBC AUTOMATED: CPT

## 2023-02-07 PROCEDURE — 770001 HCHG ROOM/CARE - MED/SURG/GYN PRIV*

## 2023-02-07 RX ORDER — SODIUM CHLORIDE, SODIUM LACTATE, POTASSIUM CHLORIDE, CALCIUM CHLORIDE 600; 310; 30; 20 MG/100ML; MG/100ML; MG/100ML; MG/100ML
INJECTION, SOLUTION INTRAVENOUS CONTINUOUS
Status: ACTIVE | OUTPATIENT
Start: 2023-02-07 | End: 2023-02-08

## 2023-02-07 RX ORDER — OXYCODONE HYDROCHLORIDE 5 MG/1
7.5 TABLET ORAL EVERY 4 HOURS PRN
Status: DISCONTINUED | OUTPATIENT
Start: 2023-02-07 | End: 2023-02-16 | Stop reason: HOSPADM

## 2023-02-07 RX ORDER — OXYCODONE HYDROCHLORIDE 5 MG/1
5 TABLET ORAL EVERY 4 HOURS PRN
Status: DISCONTINUED | OUTPATIENT
Start: 2023-02-07 | End: 2023-02-16 | Stop reason: HOSPADM

## 2023-02-07 RX ADMIN — SODIUM CHLORIDE, POTASSIUM CHLORIDE, SODIUM LACTATE AND CALCIUM CHLORIDE: 600; 310; 30; 20 INJECTION, SOLUTION INTRAVENOUS at 12:34

## 2023-02-07 RX ADMIN — OXYCODONE HYDROCHLORIDE 7.5 MG: 5 TABLET ORAL at 11:57

## 2023-02-07 RX ADMIN — METRONIDAZOLE 500 MG: 5 INJECTION, SOLUTION INTRAVENOUS at 17:32

## 2023-02-07 RX ADMIN — SENNOSIDES AND DOCUSATE SODIUM 2 TABLET: 50; 8.6 TABLET ORAL at 17:26

## 2023-02-07 RX ADMIN — METOPROLOL SUCCINATE 50 MG: 50 TABLET, EXTENDED RELEASE ORAL at 05:35

## 2023-02-07 RX ADMIN — CEFTRIAXONE SODIUM 2000 MG: 10 INJECTION, POWDER, FOR SOLUTION INTRAVENOUS at 19:38

## 2023-02-07 RX ADMIN — SIMETHICONE 125 MG: 125 TABLET, CHEWABLE ORAL at 21:51

## 2023-02-07 RX ADMIN — SENNOSIDES AND DOCUSATE SODIUM 2 TABLET: 50; 8.6 TABLET ORAL at 05:36

## 2023-02-07 RX ADMIN — ATORVASTATIN CALCIUM 40 MG: 40 TABLET, FILM COATED ORAL at 17:26

## 2023-02-07 RX ADMIN — METRONIDAZOLE 500 MG: 5 INJECTION, SOLUTION INTRAVENOUS at 23:10

## 2023-02-07 RX ADMIN — OXYCODONE HYDROCHLORIDE 5 MG: 5 TABLET ORAL at 16:22

## 2023-02-07 RX ADMIN — OXYCODONE HYDROCHLORIDE 5 MG: 5 TABLET ORAL at 05:35

## 2023-02-07 RX ADMIN — LISINOPRIL 20 MG: 20 TABLET ORAL at 05:36

## 2023-02-07 RX ADMIN — SIMETHICONE 125 MG: 125 TABLET, CHEWABLE ORAL at 18:12

## 2023-02-07 RX ADMIN — OXYCODONE HYDROCHLORIDE 7.5 MG: 5 TABLET ORAL at 20:55

## 2023-02-07 RX ADMIN — HYDROCHLOROTHIAZIDE 12.5 MG: 25 TABLET ORAL at 05:37

## 2023-02-07 RX ADMIN — METRONIDAZOLE 500 MG: 5 INJECTION, SOLUTION INTRAVENOUS at 09:28

## 2023-02-07 ASSESSMENT — PAIN DESCRIPTION - PAIN TYPE
TYPE: ACUTE PAIN

## 2023-02-07 NOTE — CARE PLAN
The patient is Watcher - Medium risk of patient condition declining or worsening    Shift Goals  Clinical Goals: pain managment, drainage  Patient Goals: biposy  Family Goals: na    Progress made toward(s) clinical / shift goals:    Problem: Knowledge Deficit - Standard  Goal: Patient and family/care givers will demonstrate understanding of plan of care, disease process/condition, diagnostic tests and medications  Outcome: Progressing     Problem: Pain - Standard  Goal: Alleviation of pain or a reduction in pain to the patient’s comfort goal  Outcome: Progressing       Patient is not progressing towards the following goals:

## 2023-02-07 NOTE — PROGRESS NOTES
4 Eyes Skin Assessment Completed by JESSIE Wan and JESSIE Varma.     Head WDL  Ears WDL  Nose WDL  Mouth WDL  Neck WDL  Shoulder Blades WDL  Spine WDL  (R) Arm/Elbow/Hand WDL  (L) Arm/Elbow/Hand Scattered scabs  Groin WDL  Scrotum/Coccyx/Buttocks WDL  (R) Leg WDL  (L) Leg WDL  (R) Heel/Foot/Toe WDL  (L) Heel/Foot/Toe WDL        Devices In Place Blood Pressure Cuff and Pulse Ox      Interventions In Place Pillows and Q2 Turns     Possible Skin Injury No     Pictures Uploaded Into Epic No  Wound Consult Placed No  RN Wound Prevention Protocol Ordered No

## 2023-02-07 NOTE — PROGRESS NOTES
[facility] Internal Medicine Daily Progress Note    Date of Service  2/7/2023    [facility] Team: R IM White Team   Attending: Jose Hernández M.D.   Senior Resident: Dr. Madison  Intern:  Dr. Magallanes  Contact Number: [phone]    Chief Complaint  Srinivasan Cruz is a 67 y.o. male admitted 2/5/2023 with groin pain, loss of appetite, hip pain and constipation.    Hospital Course  Srinivasan Cruz is a 67 y.o. male with PMH of type 2 diabetes, HLD, and HTN who presented 2/5/2023 with chief complaint of 2 weeks of hip pain. He initially presented to an urgent care in [place] and was prescribed a muscle relaxant for suspected sciatica. When his pain continued he decided to present to the ED. CT abdomen/pelvis noted nonspecific hypodense mass vs complex fluid collection in the L medial hemipelvis abutting L iliopsoas. IR was consulted for possible procedure.     Interval Problem Update  This morning Mr Cruz said he was still having severe pain. He said it was okay at rest but severe with any movement. No new BM since the small one noted two days ago. Pt was hopeful to have procedure today. However, later in the morning procedure was cancelled, pt and wife both expressed concern about timeliness of procedure.   -Increased oxycodone to 5-7.5 mg PRN   -Contacted IR regarding potential for procedure today. IR recommended procedure later in the week as pt had ASA 2 days ago (per med rec on admission)   -Ordered lactic acid to check for bowel ischemia; lactic acid WNL   -Liquid diet today, NPO at midnight.     I have discussed this patient's plan of care and discharge plan at IDT rounds today with Case Management, Nursing, Nursing leadership, and other members of the IDT team.    Consultants/Specialty  None    Code Status  Full Code    Disposition  Patient is not medically cleared for discharge.   Anticipate discharge to to home with close outpatient follow-up.  I have placed the appropriate orders for post-discharge  needs.    Review of Systems  Review of Systems  Review of Systems   Constitutional:  Positive for malaise/fatigue. Negative for chills, diaphoresis, fever and weight loss.   HENT:  Negative for congestion, sinus pain and sore throat.    Respiratory:  Negative for cough, hemoptysis, sputum production, shortness of breath and wheezing.    Cardiovascular:  Negative for chest pain, palpitations, orthopnea, claudication, leg swelling and PND.   Gastrointestinal:  Positive for abdominal pain and constipation. Negative for blood in stool, diarrhea, heartburn, melena, nausea and vomiting.   Genitourinary:  Negative for dysuria, flank pain, frequency, hematuria and urgency.   Musculoskeletal:  Positive for back pain, joint pain and myalgias. Negative for falls and neck pain.        L hip pain and R shoulder pain   Skin:  Negative for itching and rash.   Neurological:  Positive for weakness. Negative for dizziness, seizures, loss of consciousness and headaches.      Weakness with flexion L hip     Physical Exam  Temp:  [36.4 °C (97.6 °F)-38 °C (100.4 °F)] 36.4 °C (97.6 °F)  Pulse:  [68-85] 68  Resp:  [18] 18  BP: (106-136)/(54-79) 136/79  SpO2:  [91 %-98 %] 98 %    Physical Exam  Vitals and nursing note reviewed.   Constitutional:       General: He is not in acute distress.     Appearance: Normal appearance. He is normal weight. He is ill-appearing. He is not toxic-appearing or diaphoretic.      Comment: Patient appears in pain, especially with movement.   Cardiovascular:      Rate and Rhythm: Normal rate and regular rhythm.      Pulses: Normal pulses.      Heart sounds: Normal heart sounds. No murmur heard.    No friction rub. No gallop.   Pulmonary:      Effort: Pulmonary effort is normal. No respiratory distress.      Breath sounds: Normal breath sounds. No stridor. No wheezing, rhonchi or rales.   Abdominal:      General: Abdomen is flat. Bowel sounds are normal. There is no distension.      Palpations: Abdomen is soft.  There is no mass.      Tenderness: There is abdominal tenderness. There is guarding. There is no right CVA tenderness, left CVA tenderness or rebound.      Hernia: No hernia is present.      Comments: LLQ pain with palpation.    Musculoskeletal:         General: No tenderness, deformity or signs of injury.      Right lower leg: No edema.      Left lower leg: No edema.      Comment: Severe pain with any L leg movement.   Skin:     Capillary Refill: Capillary refill takes less than 2 seconds.      Coloration: Skin is not jaundiced.      Findings: No bruising, lesion or rash.   Neurological:      Mental Status: He is alert and oriented to person, place, and time.      Motor: Weakness present.      Gait: Gait normal.      Deep Tendon Reflexes: Reflexes normal.      Comments: 3/5 strength L hip with flexion    Fluids    Intake/Output Summary (Last 24 hours) at 2/7/2023 1415  Last data filed at 2/7/2023 1234  Gross per 24 hour   Intake 600 ml   Output 340 ml   Net 260 ml       Laboratory  Recent Labs     02/05/23  1016 02/06/23  0241 02/07/23  0424   WBC 13.4* 13.0* 13.6*   RBC 4.02* 3.75* 3.62*   HEMOGLOBIN 11.7* 10.9* 10.5*   HEMATOCRIT 35.6* 33.0* 31.7*   MCV 88.6 88.0 87.6   MCH 29.1 29.1 29.0   MCHC 32.9* 33.0* 33.1*   RDW 42.5 42.1 42.7   PLATELETCT 414 414 400   MPV 9.1 9.5 9.2     Recent Labs     02/05/23  1016 02/06/23  0241   SODIUM 137 135   POTASSIUM 3.1* 3.6   CHLORIDE 97 98   CO2 27 27   GLUCOSE 107* 131*   BUN 17 13   CREATININE 0.94 0.84   CALCIUM 8.9 8.4*     Recent Labs     02/06/23  0725   INR 1.21*               Imaging  CT-ABDOMEN-PELVIS WITH    Result Date: 2/5/2023  1.  There is a nonspecific hypodense mass versus complex fluid collection in the left medial hemipelvis abutting the left iliopsoas musculature just medial to the acetabulum which extends into the left inguinal region. The overall appearance on CT is more suggestive of an infectious or inflammatory process rather than necrotic metastatic  disease in this patient with known prostate cancer. This does not involve the left hip joint. 2.  There is otherwise no metastatic adenopathy in the abdomen or pelvis. 3.  Incidental simple left renal cortical cyst. No follow up imaging is recommended per consensus guidelines of the 2019 ACR Incidental Findings Committee for probably benign incidental simple appearing renal cystic lesion(s) based on imaging criteria.       Assessment/Plan  Problem Representation:    * Psoas abscess (HCC)- (present on admission)  Assessment & Plan  Ddx secondary to diverticulitis, UTI, or hematogenous or lymphatic seeding from a distant site (risk factor of diabetes)   -CT A/P 2/5 noted nonspecific hypodense mass vs complex fluid collection in L medial hemipelvis abutting L iliopsoas.   -Colonoscopy 2018 noted few colonic diverticuli. Possible source.   Plan:   -Ceftriaxone and Metronidazole  -PRN 5-7.5 Oxycodone   -Blood cx pending   -IR procedure pending.   -2/7 ordered lactic acid to assess for bowel ischemia, lactic acid WNL.      Normocytic anemia  Assessment & Plan  -On admission Hgb 11.7. Only other comparison lab available is from 4/20/2021 and was also low at 12.1 .   -anemia of chronic disease. Labs notable for low iron, low tibc, elevated ferritin.      Groin pain, left  Assessment & Plan  -Likely related to abscess. However, possible hernia; pt unable to stand so cannot do accurate hernia exam at this time. Will monitor for now.      Chronic right shoulder pain  Assessment & Plan  -PRN diclofenac gel  -Scheduled lidocaine patches      Elevated lipase  Assessment & Plan  -Pt does not meet pancreatitis criteria, will monitor for now.      Hypokalemia - resolved   Assessment & Plan  -completed 40 meq kcl x2 doses     Benign essential HTN- (present on admission)  Assessment & Plan  -Prinzide not available, ordered seperately as Lisinopril 20 and HCTZ 12.5   -Continue home metoprolol XL 50      Controlled type 2 diabetes  mellitus without complication, without long-term current use of insulin (HCC)- (present on admission)  Assessment & Plan  -home meds: metformin 1000 and Pioglitazone 30.   Plan:   -Holding home meds   -ISS   -Hypoglycemia protocol      Mixed hyperlipidemia- (present on admission)  Assessment & Plan  -continue home atorvastatin      History of prostate cancer  Assessment & Plan  -Adenocarcinoma of prostate. S/P RA RP and bilat PLND on 4/19/2021.      VTE prophylaxis: heparin ppx (held for now pending procedure)     I have performed a physical exam and reviewed and updated ROS and Plan today (2/7/2023). In review of yesterday's note (2/6/2023), there are no changes except as documented above.

## 2023-02-07 NOTE — CONSULTS
DATE OF SERVICE:  02/06/2023     INFECTIOUS DISEASE CONSULTATION     Seen today at the request of Jose Cruz Obando MD     IDENTIFICATION:  A 67-year-old male seen for evaluation of an iliopsoas   abscess.     HISTORY OF PRESENT ILLNESS:  The patient presents with a few week history of   increasing left lower quadrant pain, quite severe and worse when he extends   his leg.  He has had some chills at home and a low-grade temperature.  He   lives near [place] but he decided to come to [facility] for further evaluation   where he has been cared for before.  The patient has had a rectal radiation   therapy as part of his treatment for prostate cancer.  Denies any significant   constipation after that.  The constipation has been increasing in the last 2   to 3 weeks, difficult to have a bowel movement.  He has had no vomiting but   his intake has been poor.  The patient has no other history of intraabdominal   surgery, except he did have a pelvic surgery with a prostatectomy few years   ago and he was on treatment after that.  The patient is presently on call to radiology to  have his presumptive abscess drained.  Patient also denies any urinary   symptoms such as painful urination, dysuria, change in color or passing of any   blood in the urine.     PAST MEDICAL HISTORY:  Just remarkable for the prostate cancer, presently now   in remission, history of diabetes for which he has been not very compliant   with medicines with a hemoglobin A1c of 10.3.  History of hyperlipidemia and   hypertension.     SOCIAL HISTORY:  The patient is a retired .  He is now living above   [place] in [facility].  He is , does not smoke.     REVIEW OF SYSTEMS:  Negative, except as noted above.     Vitals:    02/06/23 1946   BP:    Pulse:    Resp:    Temp: 37.1 °C (98.8 °F)   SpO2:       PHYSICAL EXAMINATION:  VITAL SIGNS:  He had a temperature of 38.2 degrees on admission.  GENERAL:  He is very uncomfortable appearing  with any motion.  He is otherwise   alert.  Speech is clear.  ABDOMEN:  Abdominal exam focussed, seems to be some distention on the left   side of his abdomen.  Bowel sounds are present and just significant   tenderness.  EXTREMITIES:  Show no edema but he has multiple scabs consistent with diabetic   issues.     LABORATORY DATA:  His white count was 13.4 on admission.  He is somewhat   anemic and he has increased neutrophil count.  His chemistry panel shows   normal transaminases, globulin 3.6, lipase 184, iron 18 with a saturation of   10, glycohemoglobin 10.3 with normal renal function.     DIAGNOSTIC DATA:  CAT scan reviewed, shows hypodense mass versus complex fluid   collection in the left medial hemipelvis abutting the left iliopsoas   musculature just medial to the acetabulum, which extends into the left   inguinal region suggestive of infectious inflammatory process more than   necrotic tumor.  No other adenopathy noted.     ASSESSMENT AND PLAN:  This is a 67-year-old male who presents with few weeks   of increasing lower quadrant abdominal pain, some chills and fever with an   increased white count and a CT scan showing what it looks like a significant   abscess next to the iliopsoas muscles.  My assessment, this is almost   certainly a diverticular abscess.  The radiologist did not think this was   consistent with prostate cancer and seems to be unlikely to be urinary related   with a negative urinalysis.  No urine symptoms.  My recommendations are   initiate ceftriaxone and metronidazole now for initial empiric treatment of   intra-abdominal Gram-negatives and anaerobes.  Agree with proceeding with IR   drainage as this abscess will not clear without some kind of intervention.  Even if he   is on antibiotics most likely for at least 2-3 days, we should still be able   to get positive cultures by IR..  I suspect his constipation is related to this abscess and we may need to get surgical consultation later  if the IR drainage is not successful.     Thank you very much.        ______________________________  MD KRAI CASTELLON/ELIS/VITA    DD:  02/06/2023 16:31  DT:  02/06/2023 17:38    Job#:  [id number]

## 2023-02-07 NOTE — HOSPITAL COURSE
Srinivasan Cruz is a 67 y.o. male with PMH of type 2 diabetes, HLD, and HTN who presented 2/5/2023 with chief complaint of 2 weeks of hip pain. He initially presented to an urgent care in [place] and was prescribed a muscle relaxant for suspected sciatica. When his pain continued he decided to present to the ED. CT abdomen/pelvis noted nonspecific hypodense 5*3*5 cm mass vs complex fluid collection in the L medial hemipelvis abutting L iliopsoas. IR was consulted for drain placement which was placed 2/8/23. Cultures resulted positive for strep agalactiae (sensitivities not performed), so per infectious disease recommendations he was started on Zosyn 2/9. Throughout hospitalization his blood glucose was elevated, averaging 200s-300s despite increasing hospital-initiated Lantus and A1c was collected which resulted at 10.3.  Following reduction in drainage output on 2/14, a repeat CTAP was done to evaluate abscess response to treatment. It was measured at 10*1.6 cm and IR was consulted to determine further steps. Given that the fluid collection was too small to consider adding a drain or repositioning the current one, as well as the thought that the remaining fluid may be irrigant instead, removing the drain was recommended. Per ID, Augmentin 875 BID for 2 weeks was initiated (tolerated trial dose without drug reaction) and the drain was removed on the morning of discharge.

## 2023-02-07 NOTE — PROGRESS NOTES
Patient arrived to rm 174-1.  Patient is A7O x4, c/o 8/10 pain.  Medication given for pain, see MAR. Wife is bedside, both inquiring about IR procedure today.  Discussed that interventional radiologist will not do procedure today as patient had ASA, will review again tomorrow.  Provided patient ice water and snacks.  Bed is in low and locked position, safety precautions in place.  No needs at this time.

## 2023-02-07 NOTE — CARE PLAN
Problem: Knowledge Deficit - Standard  Goal: Patient and family/care givers will demonstrate understanding of plan of care, disease process/condition, diagnostic tests and medications  Outcome: Progressing     Problem: Pain - Standard  Goal: Alleviation of pain or a reduction in pain to the patient’s comfort goal  Outcome: Progressing   The patient is Stable - Low risk of patient condition declining or worsening    Shift Goals  Clinical Goals: pain management, NPO at MN or biopsy  Patient Goals: biopsy  Family Goals: comfort and to get biopsy done    Progress made toward(s) clinical / shift goals:  Patient NPO for Biopsy today. VSS.     Patient is not progressing towards the following goals:

## 2023-02-08 ENCOUNTER — APPOINTMENT (OUTPATIENT)
Dept: RADIOLOGY | Facility: MEDICAL CENTER | Age: 68
DRG: 373 | End: 2023-02-08
Attending: STUDENT IN AN ORGANIZED HEALTH CARE EDUCATION/TRAINING PROGRAM
Payer: MEDICARE

## 2023-02-08 PROBLEM — K65.1 PELVIC ABSCESS IN MALE (HCC): Status: ACTIVE | Noted: 2023-02-05

## 2023-02-08 LAB
ALBUMIN SERPL BCP-MCNC: 2.7 G/DL (ref 3.2–4.9)
ALBUMIN/GLOB SERPL: 0.8 G/DL
ALP SERPL-CCNC: 89 U/L (ref 30–99)
ALT SERPL-CCNC: 30 U/L (ref 2–50)
ANION GAP SERPL CALC-SCNC: 11 MMOL/L (ref 7–16)
AST SERPL-CCNC: 31 U/L (ref 12–45)
BASOPHILS # BLD AUTO: 0.3 % (ref 0–1.8)
BASOPHILS # BLD: 0.03 K/UL (ref 0–0.12)
BILIRUB SERPL-MCNC: 0.4 MG/DL (ref 0.1–1.5)
BUN SERPL-MCNC: 12 MG/DL (ref 8–22)
CALCIUM ALBUM COR SERPL-MCNC: 9.4 MG/DL (ref 8.5–10.5)
CALCIUM SERPL-MCNC: 8.4 MG/DL (ref 8.5–10.5)
CHLORIDE SERPL-SCNC: 97 MMOL/L (ref 96–112)
CK SERPL-CCNC: 101 U/L (ref 0–154)
CO2 SERPL-SCNC: 26 MMOL/L (ref 20–33)
CREAT SERPL-MCNC: 0.85 MG/DL (ref 0.5–1.4)
EOSINOPHIL # BLD AUTO: 0.12 K/UL (ref 0–0.51)
EOSINOPHIL NFR BLD: 1 % (ref 0–6.9)
ERYTHROCYTE [DISTWIDTH] IN BLOOD BY AUTOMATED COUNT: 42.4 FL (ref 35.9–50)
GFR SERPLBLD CREATININE-BSD FMLA CKD-EPI: 94 ML/MIN/1.73 M 2
GLOBULIN SER CALC-MCNC: 3.2 G/DL (ref 1.9–3.5)
GLUCOSE BLD STRIP.AUTO-MCNC: 108 MG/DL (ref 65–99)
GLUCOSE BLD STRIP.AUTO-MCNC: 85 MG/DL (ref 65–99)
GLUCOSE BLD STRIP.AUTO-MCNC: 97 MG/DL (ref 65–99)
GLUCOSE SERPL-MCNC: 113 MG/DL (ref 65–99)
HCT VFR BLD AUTO: 33.1 % (ref 42–52)
HGB BLD-MCNC: 11.1 G/DL (ref 14–18)
IMM GRANULOCYTES # BLD AUTO: 0.05 K/UL (ref 0–0.11)
IMM GRANULOCYTES NFR BLD AUTO: 0.4 % (ref 0–0.9)
LDH SERPL L TO P-CCNC: 191 U/L (ref 107–266)
LYMPHOCYTES # BLD AUTO: 0.86 K/UL (ref 1–4.8)
LYMPHOCYTES NFR BLD: 7.5 % (ref 22–41)
MAGNESIUM SERPL-MCNC: 1.7 MG/DL (ref 1.5–2.5)
MCH RBC QN AUTO: 29.1 PG (ref 27–33)
MCHC RBC AUTO-ENTMCNC: 33.5 G/DL (ref 33.7–35.3)
MCV RBC AUTO: 86.9 FL (ref 81.4–97.8)
MONOCYTES # BLD AUTO: 0.89 K/UL (ref 0–0.85)
MONOCYTES NFR BLD AUTO: 7.7 % (ref 0–13.4)
NEUTROPHILS # BLD AUTO: 9.54 K/UL (ref 1.82–7.42)
NEUTROPHILS NFR BLD: 83.1 % (ref 44–72)
NRBC # BLD AUTO: 0 K/UL
NRBC BLD-RTO: 0 /100 WBC
PHOSPHATE SERPL-MCNC: 3.1 MG/DL (ref 2.5–4.5)
PLATELET # BLD AUTO: 395 K/UL (ref 164–446)
PMV BLD AUTO: 9.2 FL (ref 9–12.9)
POTASSIUM SERPL-SCNC: 3.6 MMOL/L (ref 3.6–5.5)
PROT SERPL-MCNC: 5.9 G/DL (ref 6–8.2)
RBC # BLD AUTO: 3.81 M/UL (ref 4.7–6.1)
SODIUM SERPL-SCNC: 134 MMOL/L (ref 135–145)
WBC # BLD AUTO: 11.5 K/UL (ref 4.8–10.8)

## 2023-02-08 PROCEDURE — A9270 NON-COVERED ITEM OR SERVICE: HCPCS

## 2023-02-08 PROCEDURE — 84100 ASSAY OF PHOSPHORUS: CPT

## 2023-02-08 PROCEDURE — 83615 LACTATE (LD) (LDH) ENZYME: CPT

## 2023-02-08 PROCEDURE — 87075 CULTR BACTERIA EXCEPT BLOOD: CPT

## 2023-02-08 PROCEDURE — A9270 NON-COVERED ITEM OR SERVICE: HCPCS | Performed by: STUDENT IN AN ORGANIZED HEALTH CARE EDUCATION/TRAINING PROGRAM

## 2023-02-08 PROCEDURE — 99233 SBSQ HOSP IP/OBS HIGH 50: CPT | Mod: GC | Performed by: INTERNAL MEDICINE

## 2023-02-08 PROCEDURE — 700105 HCHG RX REV CODE 258

## 2023-02-08 PROCEDURE — 700101 HCHG RX REV CODE 250: Performed by: STUDENT IN AN ORGANIZED HEALTH CARE EDUCATION/TRAINING PROGRAM

## 2023-02-08 PROCEDURE — 80053 COMPREHEN METABOLIC PANEL: CPT

## 2023-02-08 PROCEDURE — 770001 HCHG ROOM/CARE - MED/SURG/GYN PRIV*

## 2023-02-08 PROCEDURE — 82550 ASSAY OF CK (CPK): CPT

## 2023-02-08 PROCEDURE — 83735 ASSAY OF MAGNESIUM: CPT

## 2023-02-08 PROCEDURE — 700111 HCHG RX REV CODE 636 W/ 250 OVERRIDE (IP)

## 2023-02-08 PROCEDURE — 700101 HCHG RX REV CODE 250

## 2023-02-08 PROCEDURE — 87070 CULTURE OTHR SPECIMN AEROBIC: CPT

## 2023-02-08 PROCEDURE — 99153 MOD SED SAME PHYS/QHP EA: CPT

## 2023-02-08 PROCEDURE — 88305 TISSUE EXAM BY PATHOLOGIST: CPT

## 2023-02-08 PROCEDURE — 700102 HCHG RX REV CODE 250 W/ 637 OVERRIDE(OP): Performed by: STUDENT IN AN ORGANIZED HEALTH CARE EDUCATION/TRAINING PROGRAM

## 2023-02-08 PROCEDURE — 87205 SMEAR GRAM STAIN: CPT

## 2023-02-08 PROCEDURE — 82962 GLUCOSE BLOOD TEST: CPT | Mod: 91

## 2023-02-08 PROCEDURE — 0W9J30Z DRAINAGE OF PELVIC CAVITY WITH DRAINAGE DEVICE, PERCUTANEOUS APPROACH: ICD-10-PCS | Performed by: RADIOLOGY

## 2023-02-08 PROCEDURE — 85025 COMPLETE CBC W/AUTO DIFF WBC: CPT

## 2023-02-08 PROCEDURE — 700102 HCHG RX REV CODE 250 W/ 637 OVERRIDE(OP)

## 2023-02-08 PROCEDURE — 36415 COLL VENOUS BLD VENIPUNCTURE: CPT

## 2023-02-08 PROCEDURE — 700111 HCHG RX REV CODE 636 W/ 250 OVERRIDE (IP): Performed by: RADIOLOGY

## 2023-02-08 PROCEDURE — 88112 CYTOPATH CELL ENHANCE TECH: CPT

## 2023-02-08 PROCEDURE — 87077 CULTURE AEROBIC IDENTIFY: CPT

## 2023-02-08 RX ORDER — MIDAZOLAM HYDROCHLORIDE 1 MG/ML
INJECTION INTRAMUSCULAR; INTRAVENOUS
Status: COMPLETED
Start: 2023-02-08 | End: 2023-02-08

## 2023-02-08 RX ORDER — SODIUM CHLORIDE 9 MG/ML
500 INJECTION, SOLUTION INTRAVENOUS
Status: ACTIVE | OUTPATIENT
Start: 2023-02-08 | End: 2023-02-08

## 2023-02-08 RX ORDER — HYDROMORPHONE HYDROCHLORIDE 1 MG/ML
0.5 INJECTION, SOLUTION INTRAMUSCULAR; INTRAVENOUS; SUBCUTANEOUS EVERY 4 HOURS PRN
Status: DISCONTINUED | OUTPATIENT
Start: 2023-02-08 | End: 2023-02-16 | Stop reason: HOSPADM

## 2023-02-08 RX ORDER — CETIRIZINE HYDROCHLORIDE 10 MG/1
10 TABLET ORAL DAILY
Status: DISCONTINUED | OUTPATIENT
Start: 2023-02-08 | End: 2023-02-14

## 2023-02-08 RX ORDER — ONDANSETRON 2 MG/ML
4 INJECTION INTRAMUSCULAR; INTRAVENOUS PRN
Status: ACTIVE | OUTPATIENT
Start: 2023-02-08 | End: 2023-02-08

## 2023-02-08 RX ORDER — MIDAZOLAM HYDROCHLORIDE 1 MG/ML
.5-2 INJECTION INTRAMUSCULAR; INTRAVENOUS PRN
Status: ACTIVE | OUTPATIENT
Start: 2023-02-08 | End: 2023-02-08

## 2023-02-08 RX ADMIN — OXYCODONE HYDROCHLORIDE 7.5 MG: 5 TABLET ORAL at 13:52

## 2023-02-08 RX ADMIN — METOPROLOL SUCCINATE 50 MG: 50 TABLET, EXTENDED RELEASE ORAL at 04:06

## 2023-02-08 RX ADMIN — MIDAZOLAM HYDROCHLORIDE 2 MG: 1 INJECTION, SOLUTION INTRAMUSCULAR; INTRAVENOUS at 15:22

## 2023-02-08 RX ADMIN — SODIUM CHLORIDE, POTASSIUM CHLORIDE, SODIUM LACTATE AND CALCIUM CHLORIDE: 600; 310; 30; 20 INJECTION, SOLUTION INTRAVENOUS at 01:08

## 2023-02-08 RX ADMIN — HYDROCHLOROTHIAZIDE 12.5 MG: 25 TABLET ORAL at 04:06

## 2023-02-08 RX ADMIN — OXYCODONE HYDROCHLORIDE 7.5 MG: 5 TABLET ORAL at 09:56

## 2023-02-08 RX ADMIN — MIDAZOLAM HYDROCHLORIDE 1 MG: 1 INJECTION, SOLUTION INTRAMUSCULAR; INTRAVENOUS at 15:29

## 2023-02-08 RX ADMIN — FENTANYL CITRATE 25 MCG: 50 INJECTION, SOLUTION INTRAMUSCULAR; INTRAVENOUS at 15:22

## 2023-02-08 RX ADMIN — FENTANYL CITRATE 50 MCG: 50 INJECTION, SOLUTION INTRAMUSCULAR; INTRAVENOUS at 15:36

## 2023-02-08 RX ADMIN — MEROPENEM 500 MG: 500 INJECTION, POWDER, FOR SOLUTION INTRAVENOUS at 17:09

## 2023-02-08 RX ADMIN — CETIRIZINE HYDROCHLORIDE 10 MG: 10 TABLET, FILM COATED ORAL at 20:47

## 2023-02-08 RX ADMIN — SENNOSIDES AND DOCUSATE SODIUM 2 TABLET: 50; 8.6 TABLET ORAL at 17:08

## 2023-02-08 RX ADMIN — METRONIDAZOLE 500 MG: 5 INJECTION, SOLUTION INTRAVENOUS at 07:48

## 2023-02-08 RX ADMIN — OXYCODONE HYDROCHLORIDE 7.5 MG: 5 TABLET ORAL at 05:05

## 2023-02-08 RX ADMIN — OXYCODONE HYDROCHLORIDE 5 MG: 5 TABLET ORAL at 01:05

## 2023-02-08 RX ADMIN — LIDOCAINE 1 PATCH: 50 PATCH TOPICAL at 17:08

## 2023-02-08 RX ADMIN — MIDAZOLAM HYDROCHLORIDE 1 MG: 1 INJECTION, SOLUTION INTRAMUSCULAR; INTRAVENOUS at 15:37

## 2023-02-08 RX ADMIN — OXYCODONE HYDROCHLORIDE 5 MG: 5 TABLET ORAL at 21:40

## 2023-02-08 RX ADMIN — FENTANYL CITRATE 25 MCG: 50 INJECTION, SOLUTION INTRAMUSCULAR; INTRAVENOUS at 15:28

## 2023-02-08 RX ADMIN — ATORVASTATIN CALCIUM 40 MG: 40 TABLET, FILM COATED ORAL at 17:08

## 2023-02-08 RX ADMIN — LISINOPRIL 20 MG: 20 TABLET ORAL at 04:06

## 2023-02-08 RX ADMIN — MEROPENEM 500 MG: 500 INJECTION, POWDER, FOR SOLUTION INTRAVENOUS at 23:38

## 2023-02-08 ASSESSMENT — PAIN DESCRIPTION - PAIN TYPE
TYPE: ACUTE PAIN

## 2023-02-08 NOTE — PROGRESS NOTES
[facility] Internal Medicine Daily Progress Note    Date of Service  2/8/2023    R Team: R IM White Team   Attending: Jose Hernández M.d.  Senior Resident: Dr. Madison  Intern:  Dr. Magallanes  Contact Number: [phone]    Chief Complaint  Srinivasan Cruz is a 67 y.o. male admitted 2/5/2023 with groin pain, loss of appetite, hip pain and constipation.    Hospital Course  Srinivasan Cruz is a 67 y.o. male with PMH of type 2 diabetes, HLD, and HTN who presented 2/5/2023 with chief complaint of 2 weeks of hip pain. He initially presented to an urgent care in [place] and was prescribed a muscle relaxant for suspected sciatica. When his pain continued he decided to present to the ED. CT abdomen/pelvis noted nonspecific hypodense mass vs complex fluid collection in the L medial hemipelvis abutting L iliopsoas. IR was consulted for possible procedure.     Interval Problem Update  This morning Mr Cruz said he felt about the same. He said his pain had worsened but with the increase in pain medication he felt as he did before. He did report walking around a little bit yesterday but said he had to drag his left leg a little because it was painful to move it. He and his wife are both very hopeful to have procedure today.   -IR procedure planned for today. Pending.     Addendum 3:11 PM   Pt's wife noted rash on pt's back. Pt said he had been itchy for about 1.5 days and that his rash had developed small blister-like vesicles that would pop when scratched. Rash only located on pt's back. No rash noted on chest, abdomen, arms, legs, face, or neck.   -Rash most consistent with exanthematous drug eruption. Dced ceftriaxone and metronidazole; started meropenem.     I have discussed this patient's plan of care and discharge plan at IDT rounds today with Case Management, Nursing, Nursing leadership, and other members of the IDT team.    Consultants/Specialty  None    Code Status  Full Code    Disposition  Patient is not  medically cleared for discharge.   Anticipate discharge to to home with close outpatient follow-up.  I have placed the appropriate orders for post-discharge needs.    Review of Systems  Review of Systems   Constitutional:  Negative for chills, diaphoresis, fever and weight loss.   HENT:  Negative for congestion, sinus pain and sore throat.    Respiratory:  Negative for cough, hemoptysis, sputum production, shortness of breath and wheezing.    Cardiovascular:  Negative for chest pain, palpitations, orthopnea, claudication, leg swelling and PND.   Gastrointestinal:  Positive for abdominal pain and constipation. Negative for blood in stool, diarrhea, heartburn, melena, nausea and vomiting.   Genitourinary:  Negative for dysuria, flank pain, frequency, hematuria and urgency.   Musculoskeletal:  Positive for back pain, joint pain and myalgias. Negative for falls and neck pain.        L hip pain and R shoulder pain   Skin:  Negative for itching and rash.   Neurological:  Positive for weakness. Negative for dizziness, seizures, loss of consciousness and headaches.      Weakness with flexion L hip     Physical Exam  Temp:  [36.4 °C (97.6 °F)-37.2 °C (99 °F)] 36.9 °C (98.5 °F)  Pulse:  [68-75] 75  Resp:  [17-18] 18  BP: (113-136)/(53-79) 116/53  SpO2:  [92 %-98 %] 95 %    Physical Exam  Vitals and nursing note reviewed.   Constitutional:       General: He is not in acute distress.     Appearance: Normal appearance. He is normal weight. He is ill-appearing. He is not toxic-appearing or diaphoretic.      Comment: Patient appears in pain, especially with movement.   Cardiovascular:      Rate and Rhythm: Normal rate and regular rhythm.      Heart sounds: Normal heart sounds. No murmur heard.    No friction rub. No gallop.   Pulmonary:      Effort: Pulmonary effort is normal. No respiratory distress.      Breath sounds: Normal breath sounds. No stridor. No wheezing, rhonchi or rales.   Abdominal:      General: Abdomen is flat.  Bowel sounds are normal. There is no distension.      Palpations: Abdomen is soft. There is no mass.      Tenderness: There is abdominal tenderness. There is guarding.     Comments: LLQ pain will present at rest. movement ,and with palpation.    Musculoskeletal:         General: No tenderness, deformity or signs of injury.      Right lower leg: No edema.      Left lower leg: No edema.      Comment: Continued pain with any L leg movement.   Neurological:      Mental Status: He is alert and oriented to person, place, and time.      Motor: Weakness present.     Fluids    Intake/Output Summary (Last 24 hours) at 2/8/2023 0518  Last data filed at 2/8/2023 0500  Gross per 24 hour   Intake 900 ml   Output 1020 ml   Net -120 ml       Laboratory  Recent Labs     02/06/23  0241 02/07/23  0424 02/08/23  0353   WBC 13.0* 13.6* 11.5*   RBC 3.75* 3.62* 3.81*   HEMOGLOBIN 10.9* 10.5* 11.1*   HEMATOCRIT 33.0* 31.7* 33.1*   MCV 88.0 87.6 86.9   MCH 29.1 29.0 29.1   MCHC 33.0* 33.1* 33.5*   RDW 42.1 42.7 42.4   PLATELETCT 414 400 395   MPV 9.5 9.2 9.2     Recent Labs     02/05/23  1016 02/06/23  0241 02/08/23  0353   SODIUM 137 135 134*   POTASSIUM 3.1* 3.6 3.6   CHLORIDE 97 98 97   CO2 27 27 26   GLUCOSE 107* 131* 113*   BUN 17 13 12   CREATININE 0.94 0.84 0.85   CALCIUM 8.9 8.4* 8.4*     Recent Labs     02/06/23  0725   INR 1.21*               Imaging  CT-ABDOMEN-PELVIS WITH    Result Date: 2/5/2023  1.  There is a nonspecific hypodense mass versus complex fluid collection in the left medial hemipelvis abutting the left iliopsoas musculature just medial to the acetabulum which extends into the left inguinal region. The overall appearance on CT is more suggestive of an infectious or inflammatory process rather than necrotic metastatic disease in this patient with known prostate cancer. This does not involve the left hip joint. 2.  There is otherwise no metastatic adenopathy in the abdomen or pelvis. 3.  Incidental simple left renal  cortical cyst. No follow up imaging is recommended per consensus guidelines of the 2019 ACR Incidental Findings Committee for probably benign incidental simple appearing renal cystic lesion(s) based on imaging criteria.       Assessment/Plan  Problem Representation:    *Pelvic abscess (HCC)- (present on admission)  Assessment & Plan  Ddx secondary to diverticulitis, UTI, or hematogenous or lymphatic seeding from a distant site (risk factor of diabetes)   -CT A/P 2/5 noted nonspecific hypodense mass vs complex fluid collection in L medial hemipelvis abutting L iliopsoas.   -Colonoscopy 2018 noted few colonic diverticuli. Possible source.   -2/7 ordered lactic acid to assess for bowel ischemia, lactic acid WNL.   Plan:   -Ceftriaxone and Metronidazole  -PRN 5-7.5 Oxycodone   -Blood cx pending   -IR procedure pending.     Exanthematous rash   Assessment & Plan  -Rash most consistent with exanthematous drug eruption. Dced ceftriaxone and metronidazole; started meropenem.     Normocytic anemia  Assessment & Plan  -On admission Hgb 11.7. Only other comparison lab available is from 4/20/2021 and was also low at 12.1 .   -anemia of chronic disease. Labs notable for low iron, low tibc, elevated ferritin.      Groin pain, left  Assessment & Plan  -Likely related to abscess. However, possible hernia; pt unable to stand so cannot do accurate hernia exam at this time. Will monitor for now.      Chronic right shoulder pain  Assessment & Plan  -PRN diclofenac gel  -Scheduled lidocaine patches      Elevated lipase  Assessment & Plan  -Pt does not meet pancreatitis criteria, will monitor for now.      Hypokalemia - resolved   Assessment & Plan  -completed 40 meq kcl x2 doses     Benign essential HTN- (present on admission)  Assessment & Plan  -Prinzide not available, ordered seperately as Lisinopril 20 and HCTZ 12.5   -Continue home metoprolol XL 50      Controlled type 2 diabetes mellitus without complication, without long-term  current use of insulin (HCC)- (present on admission)  Assessment & Plan  -home meds: metformin 1000 and Pioglitazone 30.   Plan:   -Holding home meds   -ISS   -Hypoglycemia protocol      Mixed hyperlipidemia- (present on admission)  Assessment & Plan  -continue home atorvastatin      History of prostate cancer  Assessment & Plan  -Adenocarcinoma of prostate. S/P RA RP and bilat PLND on 4/19/2021.      VTE prophylaxis: heparin ppx (held for now pending procedure)     I have performed a physical exam and reviewed and updated ROS and Plan today (2/8/2023). In review of yesterday's note (2/7/2023), there are no changes except as documented above.

## 2023-02-08 NOTE — PROGRESS NOTES
IR Procedure Note:    Dr. Guy consented patient prior to procedure; all questions answered.    Site confirmed with imaging guidance by patient, physician, RT, and RN.     Dr. Guy completed a peritoneal drain placement with 40 ml yellow green purulent  aspirate.  The patient tolerated the procedure well; ETCo2 with consistent waveform during the procedure.      Suture, gauze and metapore applied to left lower quadrant, CDI and soft..  Patient alert, oriented and verbally appropriate post procedure, patient remained hemodynamically stable during procedure and transport, see flow sheet for vital signs.  Report given to JESSIE Posada.  RN transported patient to S174 with SaO2 monitor @ 96 % on oxygen via NC on  5 lpm.     Procedure End Time: 1540      Left lower quadrant peritoneal drain:  [employer] Flexima APDL Locking Pigtail Drainage Catheter, 10 F x 25 cm, REF [id number], LOT [id number], EXP 2025-09-25

## 2023-02-08 NOTE — NON-PROVIDER
This note is for learning purposes only and not for billing. Note written by Joao Leonardo, Medical Student    Daily Progress Note:   Note Author: Joao Leonardo, Student  Date: 2/8/2023    Date of Admission: 2/5/2023  Attending: Dr. Jose Hernández  Medical Student: Joao Leonardo, MS3    Reason for interval visit  (Principal Problem)   Psoas abscess (HCC)left groin pain, left hip pain, left leg weakness, loss of appetite, constipation    Chief Complaint:   Srinivasan Cruz is a 67 y.o. male with a PMH of prostate cancer, DM2, hyperlipidemia, and HTN who was admitted on 2/5/2023 with left groin pain, left hip pain, loss of appetite, and constipation with suspected diverticular abscess.    Subjective:  No acute overnight events. Patient's pain is improving and is a 2/10 at rest. He says the extra paint medication has been beneficial. He is able to ambulate using his IV pole as assistance and doesn't have worsening pain with ambulation. He had a small bowel movement last night. He is still concerned about going into IR for drainage and is hoping he can get it done today. ROS is negative. He has no other concerns.    Consultants/Specialty:  IR.  Surgery.  ID.    Review of Systems:    ROS  See subjective.    Objective Data:    Vitals:   Temp:  [36.4 °C (97.6 °F)-37.2 °C (99 °F)] 36.7 °C (98.1 °F)  Pulse:  [68-76] 76  Resp:  [16-18] 16  BP: (113-143)/(53-79) 143/69  SpO2:  [92 %-98 %] 96 %RA    Intake/Output Summary (Last 24 hours) at 2/8/2023 0800  Last data filed at 2/8/2023 0615  Gross per 24 hour   Intake 2848.73 ml   Output 1020 ml   Net 1828.73 ml       Vitals have been reviewed and are accurate.    Physical Exam:  General: Ill appearing in no acute distress, resting on arrival to room  HEENT: Normocephalic, atraumatic, normal thyroid  Cardiovascular: RRR, no murmurs, gallops, or rubs  Pulmonary: CTAB, symmetrical chest expansion, no rales, rhonchi, or wheezes  Abdominal: Normoactive bowel  sounds, diffuse tenderness more pronounced in LLQ, guarding, distension, no rebound tenderness, no masses, no signs of trauma, erythema, or rash. No CVA tenderness.  Extremities: Moves all spontaneously, no peripheral edema, radial and pedal pulses 2+ bilateral.   Neurological: Alert and oriented, CN III-XII intact. 3/5 left hip pain limited due to pain not neuropathy, 5/5 left plantar flexion. Normal sensory exams.    Lab Results:  Recent Labs     02/05/23  1016 02/06/23  0241 02/07/23  0424 02/08/23 0353   WBC 13.4* 13.0* 13.6* 11.5*   RBC 4.02* 3.75* 3.62* 3.81*   HEMOGLOBIN 11.7* 10.9* 10.5* 11.1*   HEMATOCRIT 35.6* 33.0* 31.7* 33.1*   MCV 88.6 88.0 87.6 86.9   MCH 29.1 29.1 29.0 29.1   RDW 42.5 42.1 42.7 42.4   PLATELETCT 414 414 400 395   MPV 9.1 9.5 9.2 9.2   NEUTSPOLYS 82.00*  --   --  83.10*   LYMPHOCYTES 8.50*  --   --  7.50*   MONOCYTES 8.20  --   --  7.70   EOSINOPHILS 0.70  --   --  1.00   BASOPHILS 0.20  --   --  0.30     Recent Labs     02/05/23  1016 02/06/23  0241 02/08/23 0353   SODIUM 137 135 134*   POTASSIUM 3.1* 3.6 3.6   CHLORIDE 97 98 97   CO2 27 27 26   BUN 17 13 12   CREATININE 0.94 0.84 0.85   CALCIUM 8.9 8.4* 8.4*   MAGNESIUM  --   --  1.7   PHOSPHORUS  --   --  3.1   ALBUMIN 3.3  --  2.7*     Estimated GFR/CRCL = Estimated Creatinine Clearance: 88.6 mL/min (by C-G formula based on SCr of 0.85 mg/dL).  Recent Labs     02/05/23  1016 02/06/23  0241 02/08/23 0353   GLUCOSE 107* 131* 113*     Recent Labs     02/05/23  1016 02/06/23  0725 02/08/23  0353   ASTSGOT 22  --  31   ALTSGPT 28  --  30   TBILIRUBIN 0.5  --  0.4   ALKPHOSPHAT 91  --  89   GLOBULIN 3.6*  --  3.2   INR  --  1.21*  --      Recent Labs     02/08/23  0353   CPKTOTAL 101         Recent Labs     02/06/23  0725   INR 1.21*       Labs have been reviewed and are accurate.    Microbiology Results:  Results       Procedure Component Value Units Date/Time    Blood Culture,Hold [[id number]] Collected: 02/05/23 1335    Order  Status: Completed Updated: 02/05/23 1752     Blood Culture Hold Collected    Blood Culture,Hold [[id number]] Collected: 02/05/23 1016    Order Status: Completed Updated: 02/05/23 1303     Blood Culture Hold Collected    URINALYSIS (UA) [[id number]] Collected: 02/05/23 1205    Order Status: Completed Specimen: Urine Updated: 02/05/23 1219     Color Yellow     Character Clear     Specific Gravity 1.023     Ph 5.0     Glucose Negative mg/dL      Ketones Negative mg/dL      Protein Negative mg/dL      Bilirubin Negative     Urobilinogen, Urine 1.0     Nitrite Negative     Leukocyte Esterase Negative     Occult Blood Negative     Micro Urine Req see below     Comment: Microscopic examination not performed when specimen is clear  and chemically negative for protein, blood, leukocyte esterase  and nitrite.                 Imaging Results:  CT-ABDOMEN-PELVIS WITH   Final Result      1.  There is a nonspecific hypodense mass versus complex fluid collection in the left medial hemipelvis abutting the left iliopsoas musculature just medial to the acetabulum which extends into the left inguinal region. The overall appearance on CT is    more suggestive of an infectious or inflammatory process rather than necrotic metastatic disease in this patient with known prostate cancer. This does not involve the left hip joint.   2.  There is otherwise no metastatic adenopathy in the abdomen or pelvis.   3.  Incidental simple left renal cortical cyst. No follow up imaging is recommended per consensus guidelines of the 2019 ACR Incidental Findings Committee for probably benign incidental simple appearing renal cystic lesion(s) based on imaging criteria.      CT-DRAIN-PERITONEAL    (Results Pending)       Imaging has been reviewed and are accurate.    EKG  No results found for this or any previous visit.    Current Medications    Current Facility-Administered Medications:     oxyCODONE immediate-release (ROXICODONE) tablet 5 mg, 5 mg, Oral,  Q4HRS PRN, Hero Magallanes D.O., 5 mg at 02/08/23 0105    oxyCODONE immediate-release (ROXICODONE) tablet 7.5 mg, 7.5 mg, Oral, Q4HRS PRN, Hero Magallanes D.O., 7.5 mg at 02/08/23 0505    lactated ringers infusion, , Intravenous, Continuous, Hero Magallanes D.O., Last Rate: 100 mL/hr at 02/08/23 0615, Rate Verify at 02/08/23 0615    cefTRIAXone (Rocephin) syringe 2,000 mg, 2,000 mg, Intravenous, Q24HR, Poncho Madison D.O., 2,000 mg at 02/07/23 1938    metroNIDAZOLE (Flagyl) IVPB 500 mg, 500 mg, Intravenous, Q8HR, Poncho Madison D.O., Last Rate: 100 mL/hr at 02/08/23 0748, 500 mg at 02/08/23 0748    senna-docusate (PERICOLACE or SENOKOT S) 8.6-50 MG per tablet 2 Tablet, 2 Tablet, Oral, BID, 2 Tablet at 02/07/23 1726 **AND** polyethylene glycol/lytes (MIRALAX) PACKET 1 Packet, 1 Packet, Oral, QDAY PRN, 1 Packet at 02/06/23 1753 **AND** magnesium hydroxide (MILK OF MAGNESIA) suspension 30 mL, 30 mL, Oral, QDAY PRN **AND** bisacodyl (DULCOLAX) suppository 10 mg, 10 mg, Rectal, QDAY PRN, Hero Magallanes D.O.    [Held by provider] heparin injection 5,000 Units, 5,000 Units, Subcutaneous, Q8HRS, Hero Magallanes D.O.    acetaminophen (Tylenol) tablet 650 mg, 650 mg, Oral, Q6HRS PRN, Hero Magallanes D.O., 650 mg at 02/06/23 0255    insulin regular (HumuLIN R,NovoLIN R) injection, 1-6 Units, Subcutaneous, Q6HRS, 1 Units at 02/06/23 0001 **AND** POC blood glucose manual result, , , Q6H **AND** NOTIFY MD and PharmD, , , Once **AND** Administer 20 grams of glucose (approximately 8 ounces of fruit juice) every 15 minutes PRN FSBG less than 70 mg/dL, , , PRN **AND** dextrose 10 % BOLUS 25 g, 25 g, Intravenous, Q15 MIN PRN, Hero Magallanes D.O.    atorvastatin (LIPITOR) tablet 40 mg, 40 mg, Oral, Q EVENING, Hero Magallanes D.O., 40 mg at 02/07/23 1726    lisinopril (PRINIVIL) tablet 20 mg, 20 mg, Oral, Q DAY, Hero Magallanes D.O., 20 mg at 02/08/23 0406    hydroCHLOROthiazide (HYDRODIURIL) tablet 12.5 mg, 12.5 mg,  Oral, Q DAY, Hero Magallanes D.O., 12.5 mg at 02/08/23 0406    metoprolol SR (TOPROL XL) tablet 50 mg, 50 mg, Oral, Q DAY, Hero Magallanes D.O., 50 mg at 02/08/23 0406    lidocaine (LIDODERM) 5 % 1 Patch, 1 Patch, Transdermal, Q24HR, Hero Magallanes D.O., 1 Patch at 02/05/23 1450    simethicone (Mylicon) chewable tablet 125 mg, 125 mg, Oral, TID PRN, Arnaldo Terrazas M.D., 125 mg at 02/07/23 2151    Problem Representation:   Srinivasan Cruz is a 67 y.o. male with a PMH of prostate cancer, DM2, hyperlipidemia, and HTN who was admitted on 2/5/2023 with left groin pain, left hip pain, loss of appetite, and constipation with suspected ilopsoas abscess. He has had two weeks of worsening fatigue, fever, chills, with associated left groin and hip pain and weakness. On physical exam, there are no signs of trauma but he does have LLQ abdominal tenderness with mild guarding and distension and he is weak in the left hip. Initial abdominal CT showed nonspecific hypondense mass vs. Complex fluid collection in the left pelvic/psoas region with no metastatic adenopathy. Urinalysis was normal.     #Pelvic Abscess:              -Unknown etiology. Colonoscopy in 2018 showed diverticulosis but patient has been asymptomatic. Also had robotic prostatectomy in 2021 with no signs of metastasis. Likely diverticulosis.              -Interventional Radiology consult for drainage.   -ID consult, appreciate recs, start ceftriaxone and metronidazole.              -Tylenol and oxycodone prn for pain.   -IVF LR.   -NPO.              -Possible surgery consult if worsening.              -CTM for worsening symptoms/sepsis.     #DM2:              -2/6, A1C: 10.3  -Diabetes medication history unclear due to the patient having tried multiple meds and stopping them abruptly due to financial reasons. Only meds taken at home are metformin and insulin for the past six months. Patient said he is compliant.  -Insulin sliding scale.               -CTM.     #Anemia of Chronic Disease:              -2/5, H&H: 11.7/35.6              -2/6, H&H: 10.9/33.0              -2/6, elevated ferritin, low iron, low TIBC, low % saturation.              -CTM     #Hyperlipidemia:              -Continue home Atorvastatin.     #Hypokalemia:              -2/5, potassium 3.1              -Give supplementation.              -CTM.     VTE Prophylaxis:  Holding.     Disposition:  Inpatient.

## 2023-02-08 NOTE — CARE PLAN
The patient is Stable - Low risk of patient condition declining or worsening    Shift Goals  Clinical Goals: pain control  Patient Goals: IR in the AM  Family Goals: GERALD    Progress made toward(s) clinical / shift goals:    Problem: Bowel Elimination  Goal: Establish and maintain regular bowel function  Outcome: Progressing  Note: Pt has small bowel movement today, and stated it helped to slightly alleviate his cramping.      Problem: Mobility  Goal: Patient's capacity to carry out activities will improve  Outcome: Progressing  Note: Pt ambulated unit hallway with care aid. Tolerated well.       Patient is not progressing towards the following goals:

## 2023-02-08 NOTE — OR SURGEON
Immediate Post- Operative Note      Findings: Iliac fossa/psoas fluid collection.       Procedure(s): CT guided drain placement.       Estimated Blood Loss: Less than 5 ml        Complications: None            2/8/2023     1543 PM     Bryce Guy M.D.

## 2023-02-09 LAB
ANION GAP SERPL CALC-SCNC: 11 MMOL/L (ref 7–16)
BUN SERPL-MCNC: 17 MG/DL (ref 8–22)
CALCIUM SERPL-MCNC: 8 MG/DL (ref 8.5–10.5)
CHLORIDE SERPL-SCNC: 95 MMOL/L (ref 96–112)
CO2 SERPL-SCNC: 26 MMOL/L (ref 20–33)
CREAT SERPL-MCNC: 0.94 MG/DL (ref 0.5–1.4)
ERYTHROCYTE [DISTWIDTH] IN BLOOD BY AUTOMATED COUNT: 42.1 FL (ref 35.9–50)
GFR SERPLBLD CREATININE-BSD FMLA CKD-EPI: 88 ML/MIN/1.73 M 2
GLUCOSE BLD STRIP.AUTO-MCNC: 237 MG/DL (ref 65–99)
GLUCOSE BLD STRIP.AUTO-MCNC: 241 MG/DL (ref 65–99)
GLUCOSE BLD STRIP.AUTO-MCNC: 274 MG/DL (ref 65–99)
GLUCOSE BLD STRIP.AUTO-MCNC: 275 MG/DL (ref 65–99)
GLUCOSE SERPL-MCNC: 243 MG/DL (ref 65–99)
GRAM STN SPEC: NORMAL
HCT VFR BLD AUTO: 33.6 % (ref 42–52)
HGB BLD-MCNC: 11.1 G/DL (ref 14–18)
MCH RBC QN AUTO: 28.8 PG (ref 27–33)
MCHC RBC AUTO-ENTMCNC: 33 G/DL (ref 33.7–35.3)
MCV RBC AUTO: 87 FL (ref 81.4–97.8)
PLATELET # BLD AUTO: 417 K/UL (ref 164–446)
PMV BLD AUTO: 9.1 FL (ref 9–12.9)
POTASSIUM SERPL-SCNC: 3.7 MMOL/L (ref 3.6–5.5)
RBC # BLD AUTO: 3.86 M/UL (ref 4.7–6.1)
SCCMEC + MECA PNL NOSE NAA+PROBE: NEGATIVE
SIGNIFICANT IND 70042: NORMAL
SITE SITE: NORMAL
SODIUM SERPL-SCNC: 132 MMOL/L (ref 135–145)
SOURCE SOURCE: NORMAL
WBC # BLD AUTO: 9 K/UL (ref 4.8–10.8)

## 2023-02-09 PROCEDURE — 700111 HCHG RX REV CODE 636 W/ 250 OVERRIDE (IP): Performed by: STUDENT IN AN ORGANIZED HEALTH CARE EDUCATION/TRAINING PROGRAM

## 2023-02-09 PROCEDURE — 85027 COMPLETE CBC AUTOMATED: CPT

## 2023-02-09 PROCEDURE — 700105 HCHG RX REV CODE 258

## 2023-02-09 PROCEDURE — 36415 COLL VENOUS BLD VENIPUNCTURE: CPT

## 2023-02-09 PROCEDURE — A9270 NON-COVERED ITEM OR SERVICE: HCPCS

## 2023-02-09 PROCEDURE — 700105 HCHG RX REV CODE 258: Performed by: STUDENT IN AN ORGANIZED HEALTH CARE EDUCATION/TRAINING PROGRAM

## 2023-02-09 PROCEDURE — 82962 GLUCOSE BLOOD TEST: CPT | Mod: 91

## 2023-02-09 PROCEDURE — 770001 HCHG ROOM/CARE - MED/SURG/GYN PRIV*

## 2023-02-09 PROCEDURE — 700102 HCHG RX REV CODE 250 W/ 637 OVERRIDE(OP)

## 2023-02-09 PROCEDURE — 99233 SBSQ HOSP IP/OBS HIGH 50: CPT | Mod: GC | Performed by: INTERNAL MEDICINE

## 2023-02-09 PROCEDURE — 87641 MR-STAPH DNA AMP PROBE: CPT

## 2023-02-09 PROCEDURE — 80048 BASIC METABOLIC PNL TOTAL CA: CPT

## 2023-02-09 PROCEDURE — 700111 HCHG RX REV CODE 636 W/ 250 OVERRIDE (IP)

## 2023-02-09 RX ADMIN — INSULIN HUMAN 3 UNITS: 100 INJECTION, SOLUTION PARENTERAL at 23:47

## 2023-02-09 RX ADMIN — HEPARIN SODIUM 5000 UNITS: 5000 INJECTION INTRAVENOUS; SUBCUTANEOUS at 21:05

## 2023-02-09 RX ADMIN — HEPARIN SODIUM 5000 UNITS: 5000 INJECTION INTRAVENOUS; SUBCUTANEOUS at 13:15

## 2023-02-09 RX ADMIN — INSULIN HUMAN 3 UNITS: 100 INJECTION, SOLUTION PARENTERAL at 18:03

## 2023-02-09 RX ADMIN — ATORVASTATIN CALCIUM 40 MG: 40 TABLET, FILM COATED ORAL at 16:04

## 2023-02-09 RX ADMIN — OXYCODONE HYDROCHLORIDE 5 MG: 5 TABLET ORAL at 16:04

## 2023-02-09 RX ADMIN — OXYCODONE HYDROCHLORIDE 5 MG: 5 TABLET ORAL at 10:28

## 2023-02-09 RX ADMIN — SENNOSIDES AND DOCUSATE SODIUM 2 TABLET: 50; 8.6 TABLET ORAL at 04:38

## 2023-02-09 RX ADMIN — MEROPENEM 500 MG: 500 INJECTION, POWDER, FOR SOLUTION INTRAVENOUS at 04:42

## 2023-02-09 RX ADMIN — PIPERACILLIN AND TAZOBACTAM 3.38 G: 3; .375 INJECTION, POWDER, LYOPHILIZED, FOR SOLUTION INTRAVENOUS; PARENTERAL at 18:02

## 2023-02-09 RX ADMIN — MEROPENEM 500 MG: 500 INJECTION, POWDER, FOR SOLUTION INTRAVENOUS at 12:00

## 2023-02-09 RX ADMIN — SIMETHICONE 125 MG: 125 TABLET, CHEWABLE ORAL at 21:05

## 2023-02-09 RX ADMIN — SENNOSIDES AND DOCUSATE SODIUM 2 TABLET: 50; 8.6 TABLET ORAL at 16:04

## 2023-02-09 RX ADMIN — HYDROCHLOROTHIAZIDE 12.5 MG: 25 TABLET ORAL at 04:37

## 2023-02-09 RX ADMIN — PIPERACILLIN AND TAZOBACTAM 3.38 G: 3; .375 INJECTION, POWDER, LYOPHILIZED, FOR SOLUTION INTRAVENOUS; PARENTERAL at 21:18

## 2023-02-09 RX ADMIN — LISINOPRIL 20 MG: 20 TABLET ORAL at 04:37

## 2023-02-09 RX ADMIN — METOPROLOL SUCCINATE 50 MG: 50 TABLET, EXTENDED RELEASE ORAL at 04:37

## 2023-02-09 RX ADMIN — OXYCODONE HYDROCHLORIDE 5 MG: 5 TABLET ORAL at 04:37

## 2023-02-09 RX ADMIN — OXYCODONE HYDROCHLORIDE 5 MG: 5 TABLET ORAL at 23:58

## 2023-02-09 ASSESSMENT — PATIENT HEALTH QUESTIONNAIRE - PHQ9
2. FEELING DOWN, DEPRESSED, IRRITABLE, OR HOPELESS: NOT AT ALL
1. LITTLE INTEREST OR PLEASURE IN DOING THINGS: NOT AT ALL
SUM OF ALL RESPONSES TO PHQ9 QUESTIONS 1 AND 2: 0

## 2023-02-09 ASSESSMENT — PAIN DESCRIPTION - PAIN TYPE
TYPE: ACUTE PAIN

## 2023-02-09 NOTE — CARE PLAN
The patient is Stable - Low risk of patient condition declining or worsening    Shift Goals  Clinical Goals: Abcess drain  Patient Goals: Pain control  Family Goals: GERALD    Progress made toward(s) clinical / shift goals:    Problem: Pain - Standard  Goal: Alleviation of pain or a reduction in pain to the patient’s comfort goal  Outcome: Progressing   Patient will be maintain of pain of 5/10 or lower through 2/9   Problem: Mobility  Goal: Patient's capacity to carry out activities will improve  Outcome: Progressing     Patient will be independent with activity by discharge  Patient is not progressing towards the following goals:

## 2023-02-09 NOTE — NON-PROVIDER
This note is for learning purposes only and not for billing. Note written by Joao Leonardo, Medical Student    Daily Progress Note:   Note Author: Joao Leonardo, Student  Date: 2/9/2023    Date of Admission: 2/5/2023  Attending: Dr. Jose Hernández  Medical Student: Joao Leonardo, MS3    Reason for interval visit  (Principal Problem)   Pelvic abscess in male (HCC)left groin pain, left hip pain, left leg weakness, loss of appetite, constipation    Chief Complaint:   Srinivasan Cruz is a 67 y.o. male with a PMH of prostate cancer, DM2, hyperlipidemia, and HTN who was admitted on 2/5/2023 with left groin pain, left hip pain, loss of appetite, and constipation with suspected diverticular abscess.    Subjective:  No acute overnight events. Patient's symptoms have been improving especially his pain and mobility. He is able to ambulate unassisted for short distances. He still has some pain but it seems to be pain from his procedure and less pain from the abscess. He still has a mild rash on his back but it doesn't itch. He has no other questions or concerns.    Consultants/Specialty:  IR.  ID.    Review of Systems:    ROS  See subjective.    Objective Data:    Vitals:   Temp:  [36.8 °C (98.3 °F)-36.9 °C (98.5 °F)] 36.8 °C (98.3 °F)  Pulse:  [64-88] 77  Resp:  [8-22] 18  BP: (115-171)/(59-96) 120/75  SpO2:  [95 %-98 %] 95 %RA    Intake/Output Summary (Last 24 hours) at 2/9/2023 0748  Last data filed at 2/8/2023 1902  Gross per 24 hour   Intake 300 ml   Output --   Net 300 ml       Vitals have been reviewed and are accurate.    Physical Exam:  General: Ill appearing in no acute distress, resting on arrival to room  HEENT: Normocephalic, atraumatic, normal thyroid  Cardiovascular: RRR, no murmurs, gallops, or rubs  Pulmonary: CTAB, symmetrical chest expansion, no rales, rhonchi, or wheezes  Abdominal: Normoactive bowel sounds, diffuse tenderness that is reducing but more pronounced in LLQ, guarding,  distension, no rebound tenderness, no masses, no signs of trauma, erythema, or rash. No CVA tenderness.  Extremities: Moves all spontaneously, no peripheral edema, radial and pedal pulses 2+ bilateral.   Neurological: Alert and oriented, CN III-XII intact. 3/5 left hip pain limited due to pain not neuropathy, 5/5 left plantar flexion. Normal sensory exams.    Lab Results:  Recent Labs     02/07/23  0424 02/08/23 0353 02/09/23 0359   WBC 13.6* 11.5* 9.0   RBC 3.62* 3.81* 3.86*   HEMOGLOBIN 10.5* 11.1* 11.1*   HEMATOCRIT 31.7* 33.1* 33.6*   MCV 87.6 86.9 87.0   MCH 29.0 29.1 28.8   RDW 42.7 42.4 42.1   PLATELETCT 400 395 417   MPV 9.2 9.2 9.1   NEUTSPOLYS  --  83.10*  --    LYMPHOCYTES  --  7.50*  --    MONOCYTES  --  7.70  --    EOSINOPHILS  --  1.00  --    BASOPHILS  --  0.30  --      Recent Labs     02/08/23 0353 02/09/23 0359   SODIUM 134* 132*   POTASSIUM 3.6 3.7   CHLORIDE 97 95*   CO2 26 26   BUN 12 17   CREATININE 0.85 0.94   CALCIUM 8.4* 8.0*   MAGNESIUM 1.7  --    PHOSPHORUS 3.1  --    ALBUMIN 2.7*  --      Estimated GFR/CRCL = Estimated Creatinine Clearance: 80.1 mL/min (by C-G formula based on SCr of 0.94 mg/dL).  Recent Labs     02/08/23 0353 02/09/23 0359   GLUCOSE 113* 243*     Recent Labs     02/08/23 0353   ASTSGOT 31   ALTSGPT 30   TBILIRUBIN 0.4   ALKPHOSPHAT 89   GLOBULIN 3.2     Recent Labs     02/08/23 0353   CPKTOTAL 101         No results for input(s): INR, APTT, FIBRINOGEN in the last 72 hours.    Invalid input(s): DIMER    Labs have been reviewed and are accurate.    Microbiology Results:  Results       Procedure Component Value Units Date/Time    CULTURE WOUND W/ GRAM STAIN [[id number]]  (Abnormal) Collected: 02/08/23 1540    Order Status: No result Specimen: Wound Updated: 02/09/23 0634     Significant Indicator NEG     Source WND     Site LLQ abdominal abscess     Culture Result -     Gram Stain Result Many WBCs.  Moderate Gram positive cocci.      Anaerobic Culture [[id number]]  Collected: 02/08/23 1540    Order Status: Completed Specimen: Wound Updated: 02/09/23 0634     Significant Indicator NEG     Source WND     Site LLQ abdominal abscess     Culture Result Culture in progress.    GRAM STAIN [[id number]] Collected: 02/08/23 1540    Order Status: Completed Specimen: Wound Updated: 02/09/23 0332     Significant Indicator .     Source WND     Site LLQ abdominal abscess     Gram Stain Result Many WBCs.  Moderate Gram positive cocci.      FLUID CULTURE W/GRAM STAIN [[id number]]     Order Status: No result Specimen: Body Fluid from Peritoneal Fluid     Aerobic/Anaerobic Culture (Surgery) [[id number]]     Order Status: No result Specimen: Other from Abdominal     Blood Culture,Hold [[id number]] Collected: 02/05/23 1335    Order Status: Completed Updated: 02/05/23 1752     Blood Culture Hold Collected    Blood Culture,Hold [[id number]] Collected: 02/05/23 1016    Order Status: Completed Updated: 02/05/23 1303     Blood Culture Hold Collected    URINALYSIS (UA) [[id number]] Collected: 02/05/23 1205    Order Status: Completed Specimen: Urine Updated: 02/05/23 1219     Color Yellow     Character Clear     Specific Gravity 1.023     Ph 5.0     Glucose Negative mg/dL      Ketones Negative mg/dL      Protein Negative mg/dL      Bilirubin Negative     Urobilinogen, Urine 1.0     Nitrite Negative     Leukocyte Esterase Negative     Occult Blood Negative     Micro Urine Req see below     Comment: Microscopic examination not performed when specimen is clear  and chemically negative for protein, blood, leukocyte esterase  and nitrite.                 Imaging Results:  CT-DRAIN-PERITONEAL   Final Result      1.  CT GUIDED PLACEMENT OF A PERCUTANEOUS DRAINAGE CATHETER IN A LEFT PELVIC SIDEWALL FLUID COLLECTION.   2.  THE CURRENT PLAN IS TO MONITOR DRAINAGE OUTPUT AND OBTAIN A FOLLOWUP CT SCAN IN 5-7 DAYS IF CLINICALLY INDICATED.      CT-ABDOMEN-PELVIS WITH   Final Result      1.  There is a nonspecific hypodense  mass versus complex fluid collection in the left medial hemipelvis abutting the left iliopsoas musculature just medial to the acetabulum which extends into the left inguinal region. The overall appearance on CT is    more suggestive of an infectious or inflammatory process rather than necrotic metastatic disease in this patient with known prostate cancer. This does not involve the left hip joint.   2.  There is otherwise no metastatic adenopathy in the abdomen or pelvis.   3.  Incidental simple left renal cortical cyst. No follow up imaging is recommended per consensus guidelines of the 2019 ACR Incidental Findings Committee for probably benign incidental simple appearing renal cystic lesion(s) based on imaging criteria.          Imaging has been reviewed and are accurate.    EKG  No results found for this or any previous visit.    Current Medications    Current Facility-Administered Medications:     HYDROmorphone (Dilaudid) injection 0.5 mg, 0.5 mg, Intravenous, Q4HRS PRN, Hero Magallanes D.O.    meropenem (Merrem) 500 mg in  mL IV-MBP, 500 mg, Intravenous, Q6HR, Hero Magallanes D.O., Stopped at 02/09/23 0512    cetirizine (ZYRTEC) tablet 10 mg, 10 mg, Oral, DAILY, SOCRATES Hightower.OSolo, 10 mg at 02/08/23 2047    oxyCODONE immediate-release (ROXICODONE) tablet 5 mg, 5 mg, Oral, Q4HRS PRN, SOCRATES Shannon.O., 5 mg at 02/09/23 0437    oxyCODONE immediate-release (ROXICODONE) tablet 7.5 mg, 7.5 mg, Oral, Q4HRS PRN, SOCRATES Shannon.O., 7.5 mg at 02/08/23 1352    senna-docusate (PERICOLACE or SENOKOT S) 8.6-50 MG per tablet 2 Tablet, 2 Tablet, Oral, BID, 2 Tablet at 02/09/23 0438 **AND** polyethylene glycol/lytes (MIRALAX) PACKET 1 Packet, 1 Packet, Oral, QDAY PRN, 1 Packet at 02/06/23 1753 **AND** magnesium hydroxide (MILK OF MAGNESIA) suspension 30 mL, 30 mL, Oral, QDAY PRN **AND** bisacodyl (DULCOLAX) suppository 10 mg, 10 mg, Rectal, QDAY PRN, Hero Magallanes D.O.    [Held by provider] heparin  injection 5,000 Units, 5,000 Units, Subcutaneous, Q8HRS, NAV ShannonO.    acetaminophen (Tylenol) tablet 650 mg, 650 mg, Oral, Q6HRS PRN, Hero Magallanes D.O., 650 mg at 02/06/23 0255    insulin regular (HumuLIN R,NovoLIN R) injection, 1-6 Units, Subcutaneous, Q6HRS, 1 Units at 02/06/23 0001 **AND** POC blood glucose manual result, , , Q6H **AND** NOTIFY MD and PharmD, , , Once **AND** Administer 20 grams of glucose (approximately 8 ounces of fruit juice) every 15 minutes PRN FSBG less than 70 mg/dL, , , PRN **AND** dextrose 10 % BOLUS 25 g, 25 g, Intravenous, Q15 MIN PRN, SOCRATES Shannon.O.    atorvastatin (LIPITOR) tablet 40 mg, 40 mg, Oral, Q EVENING, NAV ShannonO., 40 mg at 02/08/23 1708    lisinopril (PRINIVIL) tablet 20 mg, 20 mg, Oral, Q DAY, SOCRATES Shannon.O., 20 mg at 02/09/23 0437    hydroCHLOROthiazide (HYDRODIURIL) tablet 12.5 mg, 12.5 mg, Oral, Q DAY, SOCRATES Shannon.O., 12.5 mg at 02/09/23 0437    metoprolol SR (TOPROL XL) tablet 50 mg, 50 mg, Oral, Q DAY, SOCRATES Shannon.O., 50 mg at 02/09/23 0437    lidocaine (LIDODERM) 5 % 1 Patch, 1 Patch, Transdermal, Q24HR, SOCRATES Shannon.O., 1 Patch at 02/08/23 1708    simethicone (Mylicon) chewable tablet 125 mg, 125 mg, Oral, TID PRN, Arnaldo Terrazas M.D., 125 mg at 02/07/23 5531    Problem Representation:   Srinivasan Cruz is a 67 y.o. male with a PMH of prostate cancer, DM2, hyperlipidemia, and HTN who was admitted on 2/5/2023 with left groin pain, left hip pain, loss of appetite, and constipation with suspected ilopsoas abscess. He has had two weeks of worsening fatigue, fever, chills, with associated left groin and hip pain and weakness. On physical exam, there are no signs of trauma but he does have LLQ abdominal tenderness with mild guarding and distension and he is weak in the left hip. Initial abdominal CT showed nonspecific hypondense mass vs. Complex fluid collection in the left pelvic/psoas region with no  metastatic adenopathy. Urinalysis was normal.     #Pelvic Abscess:              -Unknown etiology. Colonoscopy in 2018 showed diverticulosis but patient has been asymptomatic. Also had robotic prostatectomy in 2021 with no signs of metastasis. Likely diverticulosis.              -Interventional Radiology consult for drainage.              -ID consult, appreciate recs, start ceftriaxone and metronidazole.              -Tylenol and oxycodone prn for pain.              -IVF LR.              -NPO.   -2/8, IR drained 40mL, had another 30mL overnight of purulent fluid.   -Pending cultures and perform CT in 5-7 days.              -Possible surgery consult if worsening.              -CTM for worsening symptoms/sepsis.   -Patient will need colonoscopy outpatient.     #DM2:              -2/6, A1C: 10.3  -Diabetes medication history unclear due to the patient having tried multiple meds and stopping them abruptly due to financial reasons. Only meds taken at home are metformin and insulin for the past six months. Patient said he is compliant.  -Insulin sliding scale.              -CTM.     #Anemia of Chronic Disease:              -2/5, H&H: 11.7/35.6              -2/6, H&H: 10.9/33.0              -2/6, elevated ferritin, low iron, low TIBC, low % saturation.              -CTM     #Hyperlipidemia:              -Continue home Atorvastatin.     #Hypokalemia:              -2/5, potassium 3.1              -Give supplementation.              -CTM.     VTE Prophylaxis:  Holding.     Disposition:  Inpatient.

## 2023-02-09 NOTE — CARE PLAN
The patient is Stable - Low risk of patient condition declining or worsening    Shift Goals  Clinical Goals: monitor drain output, pain management  Patient Goals: pain control, rest and comfort  Family Goals: GERALD    Progress made toward(s) clinical / shift goals:  Patient pain is managed with Prn pain medication orders. Patient ambulates with out assistance and is engaged in his own self care.

## 2023-02-09 NOTE — DIETARY
Nutrition Services Brief Update:    Day 4 of admit.  Srinivasan Cruz is a 67 y.o. male with admitting DX of Psoas abscess (HCC) [K68.12]    Current Diet: regular.     Diet advanced and PO intake was % at dinner last night.     Problem: Nutritional:  Goal: Advance diet beyond clears. Achieve adequate nutritional intake  Description: Patient will consume >50% of meals  Outcome: progressing.     RD following.

## 2023-02-10 LAB
ANION GAP SERPL CALC-SCNC: 9 MMOL/L (ref 7–16)
BACTERIA WND AEROBE CULT: ABNORMAL
BACTERIA WND AEROBE CULT: ABNORMAL
BASOPHILS # BLD AUTO: 0.4 % (ref 0–1.8)
BASOPHILS # BLD: 0.03 K/UL (ref 0–0.12)
BUN SERPL-MCNC: 14 MG/DL (ref 8–22)
CALCIUM SERPL-MCNC: 8.1 MG/DL (ref 8.5–10.5)
CHLORIDE SERPL-SCNC: 98 MMOL/L (ref 96–112)
CO2 SERPL-SCNC: 27 MMOL/L (ref 20–33)
CREAT SERPL-MCNC: 0.95 MG/DL (ref 0.5–1.4)
CYTOLOGY REG CYTOL: NORMAL
EOSINOPHIL # BLD AUTO: 0.15 K/UL (ref 0–0.51)
EOSINOPHIL NFR BLD: 1.9 % (ref 0–6.9)
ERYTHROCYTE [DISTWIDTH] IN BLOOD BY AUTOMATED COUNT: 42.1 FL (ref 35.9–50)
GFR SERPLBLD CREATININE-BSD FMLA CKD-EPI: 87 ML/MIN/1.73 M 2
GLUCOSE BLD STRIP.AUTO-MCNC: 215 MG/DL (ref 65–99)
GLUCOSE BLD STRIP.AUTO-MCNC: 241 MG/DL (ref 65–99)
GLUCOSE BLD STRIP.AUTO-MCNC: 273 MG/DL (ref 65–99)
GLUCOSE BLD STRIP.AUTO-MCNC: 370 MG/DL (ref 65–99)
GLUCOSE SERPL-MCNC: 257 MG/DL (ref 65–99)
GRAM STN SPEC: ABNORMAL
HCT VFR BLD AUTO: 32.6 % (ref 42–52)
HGB BLD-MCNC: 10.6 G/DL (ref 14–18)
IMM GRANULOCYTES # BLD AUTO: 0.04 K/UL (ref 0–0.11)
IMM GRANULOCYTES NFR BLD AUTO: 0.5 % (ref 0–0.9)
LYMPHOCYTES # BLD AUTO: 0.86 K/UL (ref 1–4.8)
LYMPHOCYTES NFR BLD: 11.1 % (ref 22–41)
MCH RBC QN AUTO: 28.4 PG (ref 27–33)
MCHC RBC AUTO-ENTMCNC: 32.5 G/DL (ref 33.7–35.3)
MCV RBC AUTO: 87.4 FL (ref 81.4–97.8)
MONOCYTES # BLD AUTO: 0.85 K/UL (ref 0–0.85)
MONOCYTES NFR BLD AUTO: 11 % (ref 0–13.4)
NEUTROPHILS # BLD AUTO: 5.82 K/UL (ref 1.82–7.42)
NEUTROPHILS NFR BLD: 75.1 % (ref 44–72)
NRBC # BLD AUTO: 0 K/UL
NRBC BLD-RTO: 0 /100 WBC
PLATELET # BLD AUTO: 385 K/UL (ref 164–446)
PMV BLD AUTO: 9.3 FL (ref 9–12.9)
POTASSIUM SERPL-SCNC: 3.6 MMOL/L (ref 3.6–5.5)
RBC # BLD AUTO: 3.73 M/UL (ref 4.7–6.1)
SIGNIFICANT IND 70042: ABNORMAL
SITE SITE: ABNORMAL
SODIUM SERPL-SCNC: 134 MMOL/L (ref 135–145)
SOURCE SOURCE: ABNORMAL
WBC # BLD AUTO: 7.8 K/UL (ref 4.8–10.8)

## 2023-02-10 PROCEDURE — 82962 GLUCOSE BLOOD TEST: CPT | Mod: 91

## 2023-02-10 PROCEDURE — A9270 NON-COVERED ITEM OR SERVICE: HCPCS | Performed by: STUDENT IN AN ORGANIZED HEALTH CARE EDUCATION/TRAINING PROGRAM

## 2023-02-10 PROCEDURE — A9270 NON-COVERED ITEM OR SERVICE: HCPCS

## 2023-02-10 PROCEDURE — 99232 SBSQ HOSP IP/OBS MODERATE 35: CPT | Mod: GC | Performed by: INTERNAL MEDICINE

## 2023-02-10 PROCEDURE — 700102 HCHG RX REV CODE 250 W/ 637 OVERRIDE(OP): Performed by: STUDENT IN AN ORGANIZED HEALTH CARE EDUCATION/TRAINING PROGRAM

## 2023-02-10 PROCEDURE — 36415 COLL VENOUS BLD VENIPUNCTURE: CPT

## 2023-02-10 PROCEDURE — 770001 HCHG ROOM/CARE - MED/SURG/GYN PRIV*

## 2023-02-10 PROCEDURE — 700111 HCHG RX REV CODE 636 W/ 250 OVERRIDE (IP): Performed by: STUDENT IN AN ORGANIZED HEALTH CARE EDUCATION/TRAINING PROGRAM

## 2023-02-10 PROCEDURE — 700102 HCHG RX REV CODE 250 W/ 637 OVERRIDE(OP)

## 2023-02-10 PROCEDURE — 700111 HCHG RX REV CODE 636 W/ 250 OVERRIDE (IP)

## 2023-02-10 PROCEDURE — 80048 BASIC METABOLIC PNL TOTAL CA: CPT

## 2023-02-10 PROCEDURE — 85025 COMPLETE CBC W/AUTO DIFF WBC: CPT

## 2023-02-10 PROCEDURE — 700105 HCHG RX REV CODE 258: Performed by: STUDENT IN AN ORGANIZED HEALTH CARE EDUCATION/TRAINING PROGRAM

## 2023-02-10 RX ORDER — INSULIN LISPRO 100 [IU]/ML
1-6 INJECTION, SOLUTION INTRAVENOUS; SUBCUTANEOUS
Status: DISCONTINUED | OUTPATIENT
Start: 2023-02-11 | End: 2023-02-10

## 2023-02-10 RX ORDER — INSULIN LISPRO 100 [IU]/ML
1-6 INJECTION, SOLUTION INTRAVENOUS; SUBCUTANEOUS
Status: DISCONTINUED | OUTPATIENT
Start: 2023-02-10 | End: 2023-02-16 | Stop reason: HOSPADM

## 2023-02-10 RX ORDER — INSULIN LISPRO 100 [IU]/ML
1-6 INJECTION, SOLUTION INTRAVENOUS; SUBCUTANEOUS
Status: DISCONTINUED | OUTPATIENT
Start: 2023-02-10 | End: 2023-02-10

## 2023-02-10 RX ADMIN — OXYCODONE HYDROCHLORIDE 7.5 MG: 5 TABLET ORAL at 22:15

## 2023-02-10 RX ADMIN — HEPARIN SODIUM 5000 UNITS: 5000 INJECTION INTRAVENOUS; SUBCUTANEOUS at 22:05

## 2023-02-10 RX ADMIN — LISINOPRIL 20 MG: 20 TABLET ORAL at 04:56

## 2023-02-10 RX ADMIN — PIPERACILLIN AND TAZOBACTAM 3.38 G: 3; .375 INJECTION, POWDER, LYOPHILIZED, FOR SOLUTION INTRAVENOUS; PARENTERAL at 22:09

## 2023-02-10 RX ADMIN — HEPARIN SODIUM 5000 UNITS: 5000 INJECTION INTRAVENOUS; SUBCUTANEOUS at 12:53

## 2023-02-10 RX ADMIN — INSULIN HUMAN 2 UNITS: 100 INJECTION, SOLUTION PARENTERAL at 06:39

## 2023-02-10 RX ADMIN — CETIRIZINE HYDROCHLORIDE 10 MG: 10 TABLET, FILM COATED ORAL at 04:56

## 2023-02-10 RX ADMIN — INSULIN LISPRO 5 UNITS: 100 INJECTION, SOLUTION INTRAVENOUS; SUBCUTANEOUS at 11:53

## 2023-02-10 RX ADMIN — OXYCODONE HYDROCHLORIDE 7.5 MG: 5 TABLET ORAL at 04:55

## 2023-02-10 RX ADMIN — ATORVASTATIN CALCIUM 40 MG: 40 TABLET, FILM COATED ORAL at 16:33

## 2023-02-10 RX ADMIN — INSULIN GLARGINE-YFGN 5 UNITS: 100 INJECTION, SOLUTION SUBCUTANEOUS at 11:53

## 2023-02-10 RX ADMIN — PIPERACILLIN AND TAZOBACTAM 3.38 G: 3; .375 INJECTION, POWDER, LYOPHILIZED, FOR SOLUTION INTRAVENOUS; PARENTERAL at 05:15

## 2023-02-10 RX ADMIN — SENNOSIDES AND DOCUSATE SODIUM 2 TABLET: 50; 8.6 TABLET ORAL at 04:56

## 2023-02-10 RX ADMIN — SIMETHICONE 125 MG: 125 TABLET, CHEWABLE ORAL at 16:43

## 2023-02-10 RX ADMIN — OXYCODONE HYDROCHLORIDE 5 MG: 5 TABLET ORAL at 10:43

## 2023-02-10 RX ADMIN — PIPERACILLIN AND TAZOBACTAM 3.38 G: 3; .375 INJECTION, POWDER, LYOPHILIZED, FOR SOLUTION INTRAVENOUS; PARENTERAL at 12:54

## 2023-02-10 RX ADMIN — SENNOSIDES AND DOCUSATE SODIUM 2 TABLET: 50; 8.6 TABLET ORAL at 16:33

## 2023-02-10 RX ADMIN — HEPARIN SODIUM 5000 UNITS: 5000 INJECTION INTRAVENOUS; SUBCUTANEOUS at 04:56

## 2023-02-10 RX ADMIN — METOPROLOL SUCCINATE 50 MG: 50 TABLET, EXTENDED RELEASE ORAL at 04:56

## 2023-02-10 RX ADMIN — OXYCODONE HYDROCHLORIDE 7.5 MG: 5 TABLET ORAL at 16:33

## 2023-02-10 RX ADMIN — INSULIN LISPRO 3 UNITS: 100 INJECTION, SOLUTION INTRAVENOUS; SUBCUTANEOUS at 18:39

## 2023-02-10 RX ADMIN — HYDROCHLOROTHIAZIDE 12.5 MG: 25 TABLET ORAL at 04:56

## 2023-02-10 RX ADMIN — INSULIN LISPRO 2 UNITS: 100 INJECTION, SOLUTION INTRAVENOUS; SUBCUTANEOUS at 23:08

## 2023-02-10 ASSESSMENT — PAIN DESCRIPTION - PAIN TYPE
TYPE: ACUTE PAIN

## 2023-02-10 ASSESSMENT — PATIENT HEALTH QUESTIONNAIRE - PHQ9
2. FEELING DOWN, DEPRESSED, IRRITABLE, OR HOPELESS: NOT AT ALL
SUM OF ALL RESPONSES TO PHQ9 QUESTIONS 1 AND 2: 0
1. LITTLE INTEREST OR PLEASURE IN DOING THINGS: NOT AT ALL

## 2023-02-10 NOTE — CARE PLAN
The patient is Watcher - Medium risk of patient condition declining or worsening    Shift Goals  Clinical Goals: monitor drain output  Patient Goals: have good BM  Family Goals: GERALD    Progress made toward(s) clinical / shift goals:    Problem: Pain - Standard  Goal: Alleviation of pain or a reduction in pain to the patient’s comfort goal  Outcome: Progressing  Note: Pt expressing decrease in pain from pain medication     Problem: Mobility  Goal: Patient's capacity to carry out activities will improve  Outcome: Progressing  Note: Pt ambulating well to the bathroom       Patient is not progressing towards the following goals:

## 2023-02-10 NOTE — NON-PROVIDER
This note is for learning purposes only and not for billing. Note written by Joao Leonardo, Medical Student    Daily Progress Note:   Note Author: Joao Leonardo, Student  Date: 2/10/2023    Date of Admission: 2/5/2023  Attending: Dr. Jose Hernández  Medical Student: Joao Leonardo, MS3    Reason for interval visit  (Principal Problem)   Pelvic abscess in male (HCC)left groin pain, left hip pain, left leg weakness, loss of appetite, constipation    Chief Complaint:   Srinivasan Cruz is a 67 y.o. male with a PMH of prostate cancer, DM2, hyperlipidemia, and HTN who was admitted on 2/5/2023 with left groin pain, left hip pain, loss of appetite, and constipation with suspected diverticular abscess.    Subjective:  No acute overnight events. Patient's symptoms continually improving. Pain is at a 2-3/10. He was able to have a large, soft, thin stool in the morning. EMETERIO continues to drain purulent fluid and his rash is reducing and is not itchy. ROS is negative. He has no other concerns.    Consultants/Specialty:  IR.  ID.    Review of Systems:    ROS  See subjective.    Objective Data:    Vitals:   Temp:  [36.7 °C (98.1 °F)-36.8 °C (98.3 °F)] 36.8 °C (98.2 °F)  Pulse:  [70-77] 74  Resp:  [16-18] 16  BP: (109-155)/(63-75) 155/73  SpO2:  [92 %-98 %] 95 %RA    Intake/Output Summary (Last 24 hours) at 2/10/2023 0737  Last data filed at 2/10/2023 0700  Gross per 24 hour   Intake --   Output 55 ml   Net -55 ml       Vitals have been reviewed and are accurate.    Physical Exam:  General: Ill appearing in no acute distress, resting on arrival to room  HEENT: Normocephalic, atraumatic, normal thyroid  Cardiovascular: RRR, no murmurs, gallops, or rubs  Pulmonary: CTAB, symmetrical chest expansion, no rales, rhonchi, or wheezes  Abdominal: Normoactive bowel sounds, LLQ tenderness and guarding, distension, no rebound tenderness, no masses, no signs of trauma, erythema. No CVA tenderness.  Extremities: Moves all  spontaneously, no peripheral edema, radial and pedal pulses 2+ bilateral.   Neurological: Alert and oriented, CN III-XII intact, reduced left hip strength limited to pain not neuropathy, 5/5 left plantar flexion. Normal sensory exams.    Lab Results:  Recent Labs     02/08/23  0353 02/09/23  0359 02/10/23  0200   WBC 11.5* 9.0 7.8   RBC 3.81* 3.86* 3.73*   HEMOGLOBIN 11.1* 11.1* 10.6*   HEMATOCRIT 33.1* 33.6* 32.6*   MCV 86.9 87.0 87.4   MCH 29.1 28.8 28.4   RDW 42.4 42.1 42.1   PLATELETCT 395 417 385   MPV 9.2 9.1 9.3   NEUTSPOLYS 83.10*  --  75.10*   LYMPHOCYTES 7.50*  --  11.10*   MONOCYTES 7.70  --  11.00   EOSINOPHILS 1.00  --  1.90   BASOPHILS 0.30  --  0.40     Recent Labs     02/08/23  0353 02/09/23  0359 02/10/23  0200   SODIUM 134* 132* 134*   POTASSIUM 3.6 3.7 3.6   CHLORIDE 97 95* 98   CO2 26 26 27   BUN 12 17 14   CREATININE 0.85 0.94 0.95   CALCIUM 8.4* 8.0* 8.1*   MAGNESIUM 1.7  --   --    PHOSPHORUS 3.1  --   --    ALBUMIN 2.7*  --   --      Estimated GFR/CRCL = Estimated Creatinine Clearance: 79.3 mL/min (by C-G formula based on SCr of 0.95 mg/dL).  Recent Labs     02/08/23  0353 02/09/23  0359 02/10/23  0200   GLUCOSE 113* 243* 257*     Recent Labs     02/08/23 0353   ASTSGOT 31   ALTSGPT 30   TBILIRUBIN 0.4   ALKPHOSPHAT 89   GLOBULIN 3.2     Recent Labs     02/08/23 0353   CPKTOTAL 101         No results for input(s): INR, APTT, FIBRINOGEN in the last 72 hours.    Invalid input(s): DIMER    Labs have been reviewed and are accurate.    Microbiology Results:  Results       Procedure Component Value Units Date/Time    CULTURE WOUND W/ GRAM STAIN [[id number]]  (Abnormal) Collected: 02/08/23 1540    Order Status: Completed Specimen: Wound Updated: 02/09/23 1898     Significant Indicator POS     Source WND     Site LLQ abdominal abscess     Culture Result -     Gram Stain Result Many WBCs.  Moderate Gram positive cocci.       Culture Result Streptococcus agalactiae (Group B)  Heavy growth       Anaerobic Culture [[id number]] Collected: 02/08/23 1540    Order Status: Completed Specimen: Wound Updated: 02/09/23 1714     Significant Indicator NEG     Source WND     Site LLQ abdominal abscess     Culture Result Culture in progress.    MRSA By PCR (Amp) [[id number]] Collected: 02/09/23 1025    Order Status: Completed Specimen: Respirate from Nares Updated: 02/09/23 1349     MRSA by PCR Negative    Narrative:      Collected By: [id number] JUHI COX    GRAM STAIN [[id number]] Collected: 02/08/23 1540    Order Status: Completed Specimen: Wound Updated: 02/09/23 0332     Significant Indicator .     Source WND     Site LLQ abdominal abscess     Gram Stain Result Many WBCs.  Moderate Gram positive cocci.      FLUID CULTURE W/GRAM STAIN [[id number]]     Order Status: No result Specimen: Body Fluid from Peritoneal Fluid     Aerobic/Anaerobic Culture (Surgery) [[id number]]     Order Status: No result Specimen: Other from Abdominal     Blood Culture,Hold [[id number]] Collected: 02/05/23 1335    Order Status: Completed Updated: 02/05/23 1752     Blood Culture Hold Collected    Blood Culture,Hold [[id number]] Collected: 02/05/23 1016    Order Status: Completed Updated: 02/05/23 1303     Blood Culture Hold Collected    URINALYSIS (UA) [[id number]] Collected: 02/05/23 1205    Order Status: Completed Specimen: Urine Updated: 02/05/23 1219     Color Yellow     Character Clear     Specific Gravity 1.023     Ph 5.0     Glucose Negative mg/dL      Ketones Negative mg/dL      Protein Negative mg/dL      Bilirubin Negative     Urobilinogen, Urine 1.0     Nitrite Negative     Leukocyte Esterase Negative     Occult Blood Negative     Micro Urine Req see below     Comment: Microscopic examination not performed when specimen is clear  and chemically negative for protein, blood, leukocyte esterase  and nitrite.                 Imaging Results:  CT-DRAIN-PERITONEAL   Final Result      1.  CT GUIDED PLACEMENT OF A PERCUTANEOUS DRAINAGE CATHETER  IN A LEFT PELVIC SIDEWALL FLUID COLLECTION.   2.  THE CURRENT PLAN IS TO MONITOR DRAINAGE OUTPUT AND OBTAIN A FOLLOWUP CT SCAN IN 5-7 DAYS IF CLINICALLY INDICATED.      CT-ABDOMEN-PELVIS WITH   Final Result      1.  There is a nonspecific hypodense mass versus complex fluid collection in the left medial hemipelvis abutting the left iliopsoas musculature just medial to the acetabulum which extends into the left inguinal region. The overall appearance on CT is    more suggestive of an infectious or inflammatory process rather than necrotic metastatic disease in this patient with known prostate cancer. This does not involve the left hip joint.   2.  There is otherwise no metastatic adenopathy in the abdomen or pelvis.   3.  Incidental simple left renal cortical cyst. No follow up imaging is recommended per consensus guidelines of the 2019 ACR Incidental Findings Committee for probably benign incidental simple appearing renal cystic lesion(s) based on imaging criteria.          Imaging has been reviewed and are accurate.    EKG  No results found for this or any previous visit.    Current Medications    Current Facility-Administered Medications:     [COMPLETED] piperacillin-tazobactam (Zosyn) 3.375 g in  mL IVPB, 3.375 g, Intravenous, Once, Stopped at 02/09/23 1832 **AND** piperacillin-tazobactam (Zosyn) 3.375 g in  mL IVPB, 3.375 g, Intravenous, Q8HRS, Poncho Madison D.O., Last Rate: 25 mL/hr at 02/10/23 0515, 3.375 g at 02/10/23 0515    HYDROmorphone (Dilaudid) injection 0.5 mg, 0.5 mg, Intravenous, Q4HRS PRN, Hero Magallanes D.O.    cetirizine (ZYRTEC) tablet 10 mg, 10 mg, Oral, DAILY, Poncho Madison D.O., 10 mg at 02/10/23 0456    oxyCODONE immediate-release (ROXICODONE) tablet 5 mg, 5 mg, Oral, Q4HRS PRN, NAV ShannonOSolo, 5 mg at 02/09/23 3135    oxyCODONE immediate-release (ROXICODONE) tablet 7.5 mg, 7.5 mg, Oral, Q4HRS PRN, Hero Magallanes D.O., 7.5 mg at 02/10/23 0455    senna-docusate  (PERICOLACE or SENOKOT S) 8.6-50 MG per tablet 2 Tablet, 2 Tablet, Oral, BID, 2 Tablet at 02/10/23 0456 **AND** polyethylene glycol/lytes (MIRALAX) PACKET 1 Packet, 1 Packet, Oral, QDAY PRN, 1 Packet at 02/06/23 1753 **AND** magnesium hydroxide (MILK OF MAGNESIA) suspension 30 mL, 30 mL, Oral, QDAY PRN **AND** bisacodyl (DULCOLAX) suppository 10 mg, 10 mg, Rectal, QDAY PRN, NAV ShannonO.    heparin injection 5,000 Units, 5,000 Units, Subcutaneous, Q8HRS, Hero Magallanes D.O., 5,000 Units at 02/10/23 0456    acetaminophen (Tylenol) tablet 650 mg, 650 mg, Oral, Q6HRS PRN, NAV ShannonOSolo, 650 mg at 02/06/23 0255    insulin regular (HumuLIN R,NovoLIN R) injection, 1-6 Units, Subcutaneous, Q6HRS, 2 Units at 02/10/23 0639 **AND** POC blood glucose manual result, , , Q6H **AND** NOTIFY MD and PharmD, , , Once **AND** Administer 20 grams of glucose (approximately 8 ounces of fruit juice) every 15 minutes PRN FSBG less than 70 mg/dL, , , PRN **AND** dextrose 10 % BOLUS 25 g, 25 g, Intravenous, Q15 MIN PRN, Hero Magallanes D.O.    atorvastatin (LIPITOR) tablet 40 mg, 40 mg, Oral, Q EVENING, NAV ShannonOSolo, 40 mg at 02/09/23 1604    lisinopril (PRINIVIL) tablet 20 mg, 20 mg, Oral, Q DAY, NAV ShannonO., 20 mg at 02/10/23 0456    hydroCHLOROthiazide (HYDRODIURIL) tablet 12.5 mg, 12.5 mg, Oral, Q DAY, NAV ShannonO., 12.5 mg at 02/10/23 0456    metoprolol SR (TOPROL XL) tablet 50 mg, 50 mg, Oral, Q DAY, Hero Magallanes D.O., 50 mg at 02/10/23 0456    lidocaine (LIDODERM) 5 % 1 Patch, 1 Patch, Transdermal, Q24HR, Hero Magallanes D.O., 1 Patch at 02/08/23 1708    simethicone (Mylicon) chewable tablet 125 mg, 125 mg, Oral, TID PRN, Arnaldo Terrazas M.D., 125 mg at 02/09/23 2105    Problem Representation:   Srinivasan Cruz is a 67 y.o. male with a PMH of prostate cancer, DM2, hyperlipidemia, and HTN who was admitted on 2/5/2023 with left groin pain, left hip pain, loss of appetite, and  constipation with suspected ilopsoas abscess. He has had two weeks of worsening fatigue, fever, chills, with associated left groin and hip pain and weakness. On physical exam, there are no signs of trauma but he does have LLQ abdominal tenderness with mild guarding and distension and he is weak in the left hip. Initial abdominal CT showed nonspecific hypondense mass vs. Complex fluid collection in the left pelvic/psoas region with no metastatic adenopathy. Urinalysis was normal.     #Pelvic Abscess:              -Unknown etiology. Colonoscopy in 2018 showed diverticulosis but patient has been asymptomatic. Also had robotic prostatectomy in 2021 with no signs of metastasis. Likely diverticulosis.              -Interventional Radiology consult for drainage.              -ID consult, appreciate recs.              -Tylenol and oxycodone prn for pain.              -IVF LR.              -NPO.              -2/8, IR drained 40mL, had another 30mL overnight of purulent fluid.   -Start Zosyn.              -Pending cultures and perform CT in 5-7 days.   -2/9, culture positive streptococcus agalactiae, pending anaerobic culture, MRSA negative.              -Possible surgery consult if worsening.              -CTM for worsening symptoms/sepsis.              -Patient will need colonoscopy outpatient.     #DM2:              -2/6, A1C: 10.3  -Diabetes medication history unclear due to the patient having tried multiple meds and stopping them abruptly due to financial reasons. Only meds taken at home are metformin and insulin for the past six months. Patient said he is compliant.  -Glargine and Lispro.  -Keep glucose 140-180, no sliding scale at glucose below 200.              -CTM.     #Anemia of Chronic Disease:              -2/5, H&H: 11.7/35.6              -2/6, H&H: 10.9/33.0              -2/6, elevated ferritin, low iron, low TIBC, low % saturation.              -CTM     #Hyperlipidemia:              -Continue home  Atorvastatin.     #Hypokalemia:              -2/5, potassium 3.1              -Give supplementation.              -CTM.     VTE Prophylaxis:  Heparin.     Disposition:  Inpatient.

## 2023-02-10 NOTE — PROGRESS NOTES
[facility] Internal Medicine Daily Progress Note    Date of Service  2/9/2023    R Team: R IM White Team   Attending: Jose Hernández M.d.  Senior Resident: Dr. Madison  Intern:  Dr. Magallanes  Contact Number: [phone]    Chief Complaint  Srinivasan Cruz is a 67 y.o. male admitted 2/5/2023 with groin pain, loss of appetite, hip pain and constipation.  IR drain placed on 2/8/2023.      Hospital Course  No notes on file    Interval Problem Update  NAEON.  Patient reports improved pain.  Rash is also improved, less itchy less vesicles present.  Tolerating diet.  No bowel movement since drain placement.      I have discussed this patient's plan of care and discharge plan at IDT rounds today with Case Management, Nursing, Nursing leadership, and other members of the IDT team.    Consultants/Specialty  None    Code Status  Full Code    Disposition  Patient is not medically cleared for discharge.   Anticipate discharge to to home with close outpatient follow-up.  I have placed the appropriate orders for post-discharge needs.    Review of Systems  Review of Systems   Constitutional:  Negative for chills, diaphoresis, fever and weight loss.   HENT:  Negative for congestion, sinus pain and sore throat.    Respiratory:  Negative for cough, hemoptysis, sputum production, shortness of breath and wheezing.    Cardiovascular:  Negative for chest pain, palpitations, orthopnea, claudication, leg swelling and PND.   Gastrointestinal:  Positive for abdominal pain and constipation. Negative for blood in stool, diarrhea, heartburn, melena, nausea and vomiting.   Genitourinary:  Negative for dysuria, flank pain, frequency, hematuria and urgency.   Musculoskeletal:  Positive for back pain, joint pain and myalgias. Negative for falls and neck pain.        L hip pain and R shoulder pain   Skin:  Negative for itching and rash.   Neurological:  Positive for weakness. Negative for dizziness, seizures, loss of consciousness and headaches.       Weakness with flexion L hip     Physical Exam  Temp:  [36.8 °C (98.2 °F)-36.9 °C (98.4 °F)] 36.8 °C (98.2 °F)  Pulse:  [70-77] 70  Resp:  [16-18] 18  BP: (109-120)/(59-75) 109/63  SpO2:  [95 %-98 %] 98 %    Physical Exam  Vitals and nursing note reviewed.   Constitutional:       General: He is not in acute distress.     Appearance: Normal appearance. He is normal weight. He is ill-appearing. He is not toxic-appearing or diaphoretic.      Comment: Patient appears in pain with movement,.   Cardiovascular:      Rate and Rhythm: Normal rate and regular rhythm.      Heart sounds: Normal heart sounds. No murmur heard.    No friction rub. No gallop.   Pulmonary:      Effort: Pulmonary effort is normal. No respiratory distress.      Breath sounds: Normal breath sounds. No stridor. No wheezing, rhonchi or rales.   Abdominal:      General: Abdomen is flat. Bowel sounds are normal. There is no distension.      Palpations: Abdomen is soft. There is no mass.      Tenderness: There is abdominal tenderness. There is guarding.     Comments: LLQ pain will present at rest. movement ,and with palpation.  Drain present, draining purulent fluid.    Musculoskeletal:         General: No tenderness, deformity or signs of injury.      Right lower leg: No edema.      Left lower leg: No edema.      Comment: Continued pain with any L leg movement.   Neurological:      Mental Status: He is alert and oriented to person, place, and time.      Motor: Generalized Weakness present.     Fluids    Intake/Output Summary (Last 24 hours) at 2/9/2023 1804  Last data filed at 2/8/2023 1902  Gross per 24 hour   Intake 300 ml   Output --   Net 300 ml       Laboratory  Recent Labs     02/07/23  0424 02/08/23  0353 02/09/23  0359   WBC 13.6* 11.5* 9.0   RBC 3.62* 3.81* 3.86*   HEMOGLOBIN 10.5* 11.1* 11.1*   HEMATOCRIT 31.7* 33.1* 33.6*   MCV 87.6 86.9 87.0   MCH 29.0 29.1 28.8   MCHC 33.1* 33.5* 33.0*   RDW 42.7 42.4 42.1   PLATELETCT 400 395 417   MPV 9.2  9.2 9.1     Recent Labs     02/08/23  0353 02/09/23  0359   SODIUM 134* 132*   POTASSIUM 3.6 3.7   CHLORIDE 97 95*   CO2 26 26   GLUCOSE 113* 243*   BUN 12 17   CREATININE 0.85 0.94   CALCIUM 8.4* 8.0*                     Imaging  CT-ABDOMEN-PELVIS WITH    Result Date: 2/5/2023  1.  There is a nonspecific hypodense mass versus complex fluid collection in the left medial hemipelvis abutting the left iliopsoas musculature just medial to the acetabulum which extends into the left inguinal region. The overall appearance on CT is more suggestive of an infectious or inflammatory process rather than necrotic metastatic disease in this patient with known prostate cancer. This does not involve the left hip joint. 2.  There is otherwise no metastatic adenopathy in the abdomen or pelvis. 3.  Incidental simple left renal cortical cyst. No follow up imaging is recommended per consensus guidelines of the 2019 ACR Incidental Findings Committee for probably benign incidental simple appearing renal cystic lesion(s) based on imaging criteria.       Assessment/Plan  Problem Representation:    *Pelvic abscess (HCC)- (present on admission)  Assessment & Plan  Ddx secondary to diverticulitis, UTI, or hematogenous or lymphatic seeding from a distant site (risk factor of diabetes)   -CT A/P 2/5 noted nonspecific hypodense mass vs complex fluid collection in L medial hemipelvis abutting L iliopsoas.   -Colonoscopy 2018 noted few colonic diverticuli. Possible source.   -2/7 ordered lactic acid to assess for bowel ischemia, lactic acid WNL.   Plan:   -Meropenem transitioned to zosyn.  -PRN 5-7.5 Oxycodone   -Blood cx pending   -IR drain placed on 2/8, draining purulent fluid.  Growing GPC, pending speciation and sensitivities  -will repeat imaging based on drainage output     Exanthematous rash   Assessment & Plan  -Rash most consistent with exanthematous drug eruption vs folliculitis. Dced ceftriaxone and metronidazole and rash improved;  started meropenem. May be folliculitis in the setting of significant sweating as well.      Normocytic anemia  Assessment & Plan  -On admission Hgb 11.7. Only other comparison lab available is from 4/20/2021 and was also low at 12.1 .   -anemia of chronic disease. Labs notable for low iron, low tibc, elevated ferritin.      Groin pain, left  Assessment & Plan  -Likely related to abscess. However, possible hernia; pt unable to stand so cannot do accurate hernia exam at this time. Will monitor for now.      Chronic right shoulder pain  Assessment & Plan  -PRN diclofenac gel  -Scheduled lidocaine patches      Elevated lipase  Assessment & Plan  -Pt does not meet pancreatitis criteria, will monitor for now.      Hypokalemia - resolved   Assessment & Plan  Continue to monitor, replete as necessary.       Benign essential HTN- (present on admission)  Assessment & Plan  -Prinzide not available, ordered seperately as Lisinopril 20 and HCTZ 12.5   -Continue home metoprolol XL 50      Controlled type 2 diabetes mellitus without complication, without long-term current use of insulin (HCC)- (present on admission)  Assessment & Plan  -home meds: metformin 1000 and Pioglitazone 30.   Plan:   -Holding home meds   -ISS   -Hypoglycemia protocol      Mixed hyperlipidemia- (present on admission)  Assessment & Plan  -continue home atorvastatin      History of prostate cancer  Assessment & Plan  -Adenocarcinoma of prostate. S/P RA RP and bilat PLND on 4/19/2021.      VTE prophylaxis: heparin ppx (held for now pending procedure)     I have performed a physical exam and reviewed and updated ROS and Plan today (2/9/2023). In review of yesterday's note (2/8/2023), there are no changes except as documented above.

## 2023-02-10 NOTE — PROGRESS NOTES
[facility] Internal Medicine Daily Progress Note    Date of Service  2/10/2023    R Team: R IM White Team   Attending: Jose Hernández M.d.  Senior Resident: Dr. Madison  Intern:  Dr. Magallanes  Contact Number: [phone]    Chief Complaint  Srinivasan Cruz is a 67 y.o. male admitted 2/5/2023 with groin pain, loss of appetite, hip pain and constipation.    Hospital Course  Srinivasan Cruz is a 67 y.o. male with PMH of type 2 diabetes, HLD, and HTN who presented 2/5/2023 with chief complaint of 2 weeks of hip pain. He initially presented to an urgent care in [place] and was prescribed a muscle relaxant for suspected sciatica. When his pain continued he decided to present to the ED. CT abdomen/pelvis noted nonspecific hypodense mass vs complex fluid collection in the L medial hemipelvis abutting L iliopsoas. IR was consulted for possible procedure.     Interval Problem Update  This morning Mr Cruz said he was doing well. His pain has improved since the procedure and he said he has been able to walk around a little. He reported having a bowel movement last night that was small and thin but required less straining than previous.   -fluid cultures positive for strep agalactiae. Sensitivities pending.   -Started lantus 5mg QAM     I have discussed this patient's plan of care and discharge plan at IDT rounds today with Case Management, Nursing, Nursing leadership, and other members of the IDT team.    Consultants/Specialty  None    Code Status  Full Code    Disposition  Patient is not medically cleared for discharge.   Anticipate discharge to to home with close outpatient follow-up.  I have placed the appropriate orders for post-discharge needs.    Review of Systems  Review of Systems   Constitutional:  Negative for chills, diaphoresis, fever and weight loss.   HENT:  Negative for congestion, sinus pain and sore throat.    Respiratory:  Negative for cough, hemoptysis, sputum production, shortness of breath  and wheezing.    Cardiovascular:  Negative for chest pain, palpitations, orthopnea, claudication, leg swelling and PND.   Gastrointestinal:  Positive for abdominal pain and constipation. Negative for blood in stool, diarrhea, heartburn, melena, nausea and vomiting.   Genitourinary:  Negative for dysuria, flank pain, frequency, hematuria and urgency.   Musculoskeletal:  Positive for back pain, joint pain and myalgias. Negative for falls and neck pain.        L hip pain and R shoulder pain   Skin:  Negative for itching and rash.   Neurological:  Negative for dizziness, seizures, loss of consciousness and headaches.      Weakness with flexion L hip     Physical Exam  Temp:  [36.7 °C (98.1 °F)-36.8 °C (98.3 °F)] 36.8 °C (98.2 °F)  Pulse:  [70-77] 74  Resp:  [16-18] 16  BP: (109-155)/(63-75) 155/73  SpO2:  [92 %-98 %] 95 %    Physical Exam  Vitals and nursing note reviewed.   Constitutional:       General: He is not in acute distress.     Appearance: Normal appearance. He is normal weight. He is not toxic-appearing or diaphoretic.      Comment: pain with movement.   Cardiovascular:      Rate and Rhythm: Normal rate and regular rhythm.      Heart sounds: Normal heart sounds. No murmur heard.    No friction rub. No gallop.   Pulmonary:      Effort: Pulmonary effort is normal. No respiratory distress.      Breath sounds: Normal breath sounds. No stridor. No wheezing, rhonchi or rales.   Abdominal:      General: Abdomen is flat. Bowel sounds are normal. There is no distension.      Palpations: Abdomen is soft. There is no mass.      Tenderness: There is abdominal tenderness. There is guarding.     Comments: LLQ pain will present at rest. movement ,and with palpation. Drain present, draining purulent fluid.   Musculoskeletal:         General: No tenderness, deformity or signs of injury.      Right lower leg: No edema.      Left lower leg: No edema.      Comment: Continued pain with any L leg movement.   Neurological:       Mental Status: He is alert and oriented to person, place, and time.      Motor: Weakness present.     Fluids  No intake or output data in the 24 hours ending 02/10/23 0532    Laboratory  Recent Labs     02/08/23  0353 02/09/23  0359 02/10/23  0200   WBC 11.5* 9.0 7.8   RBC 3.81* 3.86* 3.73*   HEMOGLOBIN 11.1* 11.1* 10.6*   HEMATOCRIT 33.1* 33.6* 32.6*   MCV 86.9 87.0 87.4   MCH 29.1 28.8 28.4   MCHC 33.5* 33.0* 32.5*   RDW 42.4 42.1 42.1   PLATELETCT 395 417 385   MPV 9.2 9.1 9.3     Recent Labs     02/08/23  0353 02/09/23  0359 02/10/23  0200   SODIUM 134* 132* 134*   POTASSIUM 3.6 3.7 3.6   CHLORIDE 97 95* 98   CO2 26 26 27   GLUCOSE 113* 243* 257*   BUN 12 17 14   CREATININE 0.85 0.94 0.95   CALCIUM 8.4* 8.0* 8.1*                   Imaging  CT-ABDOMEN-PELVIS WITH    Result Date: 2/5/2023  1.  There is a nonspecific hypodense mass versus complex fluid collection in the left medial hemipelvis abutting the left iliopsoas musculature just medial to the acetabulum which extends into the left inguinal region. The overall appearance on CT is more suggestive of an infectious or inflammatory process rather than necrotic metastatic disease in this patient with known prostate cancer. This does not involve the left hip joint. 2.  There is otherwise no metastatic adenopathy in the abdomen or pelvis. 3.  Incidental simple left renal cortical cyst. No follow up imaging is recommended per consensus guidelines of the 2019 ACR Incidental Findings Committee for probably benign incidental simple appearing renal cystic lesion(s) based on imaging criteria.    CT-DRAIN-PERITONEAL    Result Date: 2/8/2023  1.  CT GUIDED PLACEMENT OF A PERCUTANEOUS DRAINAGE CATHETER IN A LEFT PELVIC SIDEWALL FLUID COLLECTION. 2.  THE CURRENT PLAN IS TO MONITOR DRAINAGE OUTPUT AND OBTAIN A FOLLOWUP CT SCAN IN 5-7 DAYS IF CLINICALLY INDICATED.       Assessment/Plan  Problem Representation:    *Pelvic abscess (HCC)- (present on admission)  Assessment &  Plan  Ddx secondary to diverticulitis, UTI, or hematogenous or lymphatic seeding from a distant site (risk factor of diabetes)   -CT A/P 2/5 noted nonspecific hypodense mass vs complex fluid collection in L medial hemipelvis abutting L iliopsoas.   -Colonoscopy 2018 noted few colonic diverticuli. Possible source.   -2/7 ordered lactic acid to assess for bowel ischemia, lactic acid WNL.   Plan:   -zosyn.  -PRN 5-7.5 Oxycodone   -IR drain placed on 2/8, draining purulent fluid.  Growing group B strep pending speciation and sensitivities  -will repeat imaging based on drainage output     Exanthematous rash   Assessment & Plan  -Rash most consistent with topical irritation, exanthematous drug eruption, vs folliculitis. Dced ceftriaxone and metronidazole and rash improved; started meropenem. May be folliculitis in the setting of significant sweating as well.       Normocytic anemia  Assessment & Plan  -On admission Hgb 11.7. Only other comparison lab available is from 4/20/2021 and was also low at 12.1 .   -anemia of chronic disease. Labs notable for low iron, low tibc, elevated ferritin.      Groin pain, left  Assessment & Plan  -Likely related to abscess. However, possible hernia; pt unable to stand so cannot do accurate hernia exam at this time. Will monitor for now.      Chronic right shoulder pain  Assessment & Plan  -PRN diclofenac gel  -Scheduled lidocaine patches      Elevated lipase  Assessment & Plan  -Pt does not meet pancreatitis criteria, will monitor for now.      Hypokalemia - resolved   Assessment & Plan  Continue to monitor, replete as necessary.       Benign essential HTN- (present on admission)  Assessment & Plan  -Prinzide not available, ordered seperately as Lisinopril 20 and HCTZ 12.5   -Continue home metoprolol XL 50      Controlled type 2 diabetes mellitus without complication, without long-term current use of insulin (HCC)- (present on admission)  Assessment & Plan  -home meds: metformin 1000 and  Pioglitazone 30.   Plan:   -Holding home meds   -ISS   -Glargine 5 units QAM   -Hypoglycemia protocol      Mixed hyperlipidemia- (present on admission)  Assessment & Plan  -continue home atorvastatin      History of prostate cancer  Assessment & Plan  -Adenocarcinoma of prostate. S/P RA RP and bilat PLND on 4/19/2021.      VTE prophylaxis: heparin ppx (held for now pending procedure)     I have performed a physical exam and reviewed and updated ROS and Plan today (2/10/2023). In review of yesterday's note (2/9/2023), there are no changes except as documented above.

## 2023-02-10 NOTE — CARE PLAN
The patient is Stable - Low risk of patient condition declining or worsening    Shift Goals  Clinical Goals: Monitor drain output, pain management  Patient Goals: Pain control  Family Goals: GERALD    Progress made toward(s) clinical / shift goals:    Problem: Communication  Goal: The ability to communicate needs accurately and effectively will improve  Outcome: Progressing    Problem: Self Care  Goal: Patient will have the ability to perform ADLs independently or with assistance (bathe, groom, dress, toilet and feed)  Outcome: Progressing     Patient will be able to preform adls independently by discharge  Patient is not progressing towards the following goals:

## 2023-02-11 LAB
ANION GAP SERPL CALC-SCNC: 9 MMOL/L (ref 7–16)
BUN SERPL-MCNC: 14 MG/DL (ref 8–22)
CALCIUM SERPL-MCNC: 8 MG/DL (ref 8.5–10.5)
CHLORIDE SERPL-SCNC: 98 MMOL/L (ref 96–112)
CO2 SERPL-SCNC: 26 MMOL/L (ref 20–33)
CREAT SERPL-MCNC: 0.88 MG/DL (ref 0.5–1.4)
ERYTHROCYTE [DISTWIDTH] IN BLOOD BY AUTOMATED COUNT: 42.4 FL (ref 35.9–50)
GFR SERPLBLD CREATININE-BSD FMLA CKD-EPI: 93 ML/MIN/1.73 M 2
GLUCOSE BLD STRIP.AUTO-MCNC: 194 MG/DL (ref 65–99)
GLUCOSE BLD STRIP.AUTO-MCNC: 200 MG/DL (ref 65–99)
GLUCOSE BLD STRIP.AUTO-MCNC: 227 MG/DL (ref 65–99)
GLUCOSE BLD STRIP.AUTO-MCNC: 242 MG/DL (ref 65–99)
GLUCOSE BLD STRIP.AUTO-MCNC: 266 MG/DL (ref 65–99)
GLUCOSE SERPL-MCNC: 303 MG/DL (ref 65–99)
HCT VFR BLD AUTO: 32.4 % (ref 42–52)
HGB BLD-MCNC: 10.6 G/DL (ref 14–18)
MCH RBC QN AUTO: 28.8 PG (ref 27–33)
MCHC RBC AUTO-ENTMCNC: 32.7 G/DL (ref 33.7–35.3)
MCV RBC AUTO: 88 FL (ref 81.4–97.8)
PLATELET # BLD AUTO: 390 K/UL (ref 164–446)
PMV BLD AUTO: 9.3 FL (ref 9–12.9)
POTASSIUM SERPL-SCNC: 4.1 MMOL/L (ref 3.6–5.5)
RBC # BLD AUTO: 3.68 M/UL (ref 4.7–6.1)
SODIUM SERPL-SCNC: 133 MMOL/L (ref 135–145)
WBC # BLD AUTO: 7.5 K/UL (ref 4.8–10.8)

## 2023-02-11 PROCEDURE — 85027 COMPLETE CBC AUTOMATED: CPT

## 2023-02-11 PROCEDURE — 82962 GLUCOSE BLOOD TEST: CPT | Mod: 91

## 2023-02-11 PROCEDURE — 700102 HCHG RX REV CODE 250 W/ 637 OVERRIDE(OP): Performed by: STUDENT IN AN ORGANIZED HEALTH CARE EDUCATION/TRAINING PROGRAM

## 2023-02-11 PROCEDURE — A9270 NON-COVERED ITEM OR SERVICE: HCPCS

## 2023-02-11 PROCEDURE — 700111 HCHG RX REV CODE 636 W/ 250 OVERRIDE (IP)

## 2023-02-11 PROCEDURE — 700102 HCHG RX REV CODE 250 W/ 637 OVERRIDE(OP)

## 2023-02-11 PROCEDURE — 700105 HCHG RX REV CODE 258: Performed by: STUDENT IN AN ORGANIZED HEALTH CARE EDUCATION/TRAINING PROGRAM

## 2023-02-11 PROCEDURE — 99232 SBSQ HOSP IP/OBS MODERATE 35: CPT | Mod: GC | Performed by: INTERNAL MEDICINE

## 2023-02-11 PROCEDURE — A9270 NON-COVERED ITEM OR SERVICE: HCPCS | Performed by: STUDENT IN AN ORGANIZED HEALTH CARE EDUCATION/TRAINING PROGRAM

## 2023-02-11 PROCEDURE — 770001 HCHG ROOM/CARE - MED/SURG/GYN PRIV*

## 2023-02-11 PROCEDURE — 36415 COLL VENOUS BLD VENIPUNCTURE: CPT

## 2023-02-11 PROCEDURE — 700111 HCHG RX REV CODE 636 W/ 250 OVERRIDE (IP): Performed by: STUDENT IN AN ORGANIZED HEALTH CARE EDUCATION/TRAINING PROGRAM

## 2023-02-11 PROCEDURE — 80048 BASIC METABOLIC PNL TOTAL CA: CPT

## 2023-02-11 RX ORDER — NYSTATIN 100000 [USP'U]/G
POWDER TOPICAL 2 TIMES DAILY
Status: DISCONTINUED | OUTPATIENT
Start: 2023-02-11 | End: 2023-02-16 | Stop reason: HOSPADM

## 2023-02-11 RX ADMIN — OXYCODONE HYDROCHLORIDE 5 MG: 5 TABLET ORAL at 04:49

## 2023-02-11 RX ADMIN — METOPROLOL SUCCINATE 50 MG: 50 TABLET, EXTENDED RELEASE ORAL at 04:49

## 2023-02-11 RX ADMIN — INSULIN LISPRO 1 UNITS: 100 INJECTION, SOLUTION INTRAVENOUS; SUBCUTANEOUS at 08:27

## 2023-02-11 RX ADMIN — INSULIN LISPRO 2 UNITS: 100 INJECTION, SOLUTION INTRAVENOUS; SUBCUTANEOUS at 20:34

## 2023-02-11 RX ADMIN — OXYCODONE HYDROCHLORIDE 5 MG: 5 TABLET ORAL at 20:31

## 2023-02-11 RX ADMIN — CETIRIZINE HYDROCHLORIDE 10 MG: 10 TABLET, FILM COATED ORAL at 04:49

## 2023-02-11 RX ADMIN — INSULIN LISPRO 3 UNITS: 100 INJECTION, SOLUTION INTRAVENOUS; SUBCUTANEOUS at 18:27

## 2023-02-11 RX ADMIN — HEPARIN SODIUM 5000 UNITS: 5000 INJECTION INTRAVENOUS; SUBCUTANEOUS at 20:58

## 2023-02-11 RX ADMIN — NYSTATIN: 100000 POWDER TOPICAL at 17:43

## 2023-02-11 RX ADMIN — PIPERACILLIN AND TAZOBACTAM 3.38 G: 3; .375 INJECTION, POWDER, LYOPHILIZED, FOR SOLUTION INTRAVENOUS; PARENTERAL at 20:33

## 2023-02-11 RX ADMIN — PIPERACILLIN AND TAZOBACTAM 3.38 G: 3; .375 INJECTION, POWDER, LYOPHILIZED, FOR SOLUTION INTRAVENOUS; PARENTERAL at 05:51

## 2023-02-11 RX ADMIN — HYDROCHLOROTHIAZIDE 12.5 MG: 25 TABLET ORAL at 04:49

## 2023-02-11 RX ADMIN — HEPARIN SODIUM 5000 UNITS: 5000 INJECTION INTRAVENOUS; SUBCUTANEOUS at 05:00

## 2023-02-11 RX ADMIN — SENNOSIDES AND DOCUSATE SODIUM 2 TABLET: 50; 8.6 TABLET ORAL at 04:49

## 2023-02-11 RX ADMIN — LISINOPRIL 20 MG: 20 TABLET ORAL at 04:49

## 2023-02-11 RX ADMIN — SENNOSIDES AND DOCUSATE SODIUM 2 TABLET: 50; 8.6 TABLET ORAL at 17:42

## 2023-02-11 RX ADMIN — HEPARIN SODIUM 5000 UNITS: 5000 INJECTION INTRAVENOUS; SUBCUTANEOUS at 12:16

## 2023-02-11 RX ADMIN — INSULIN GLARGINE-YFGN 5 UNITS: 100 INJECTION, SOLUTION SUBCUTANEOUS at 04:49

## 2023-02-11 RX ADMIN — PIPERACILLIN AND TAZOBACTAM 3.38 G: 3; .375 INJECTION, POWDER, LYOPHILIZED, FOR SOLUTION INTRAVENOUS; PARENTERAL at 12:19

## 2023-02-11 RX ADMIN — OXYCODONE HYDROCHLORIDE 5 MG: 5 TABLET ORAL at 12:30

## 2023-02-11 RX ADMIN — INSULIN LISPRO 1 UNITS: 100 INJECTION, SOLUTION INTRAVENOUS; SUBCUTANEOUS at 12:31

## 2023-02-11 RX ADMIN — ATORVASTATIN CALCIUM 40 MG: 40 TABLET, FILM COATED ORAL at 17:42

## 2023-02-11 RX ADMIN — ACETAMINOPHEN 650 MG: 325 TABLET, FILM COATED ORAL at 18:27

## 2023-02-11 ASSESSMENT — PAIN DESCRIPTION - PAIN TYPE
TYPE: ACUTE PAIN

## 2023-02-11 ASSESSMENT — FIBROSIS 4 INDEX: FIB4 SCORE: 0.99

## 2023-02-11 NOTE — CARE PLAN
The patient is Stable - Low risk of patient condition declining or worsening    Shift Goals  Clinical Goals: monitor drin output  Patient Goals: Pain management  Family Goals: GERALD    Progress made toward(s) clinical / shift goals:    Problem: Knowledge Deficit - Standard  Goal: Patient and family/care givers will demonstrate understanding of plan of care, disease process/condition, diagnostic tests and medications  Outcome: Progressing   Patient will be able to commiunicate treatment plan by 2/11/2023  Problem: Pain - Standard  Goal: Alleviation of pain or a reduction in pain to the patient’s comfort goal  Outcome: Progressing   Patient will maintain of 4/10 or lower through 2/11/2023    Patient is not progressing towards the following goals:

## 2023-02-11 NOTE — PROGRESS NOTES
[facility] Internal Medicine Daily Progress Note    Date of Service  2/11/2023    [facility] Team: R IM White Team   Attending: Jose Hernández M.d.  Senior Resident: Dr. Madison  Intern:  Dr. Magallanes  Contact Number: [phone]    Chief Complaint  Srinivasan Cruz is a 67 y.o. male admitted 2/5/2023 with groin pain, loss of appetite, hip pain and constipation.    Hospital Course  Srinivasan Cruz is a 67 y.o. male with PMH of type 2 diabetes, HLD, and HTN who presented 2/5/2023 with chief complaint of 2 weeks of hip pain. He initially presented to an urgent care in [place] and was prescribed a muscle relaxant for suspected sciatica. When his pain continued he decided to present to the ED. CT abdomen/pelvis noted nonspecific hypodense mass vs complex fluid collection in the L medial hemipelvis abutting L iliopsoas. IR was consulted for possible procedure.     Interval Problem Update  Pt said pain has decreased and he has still been able to walk around. EMETERIO tube output decreasing.   -Cx sensitivities pending.   -Increased glargine to 7u qam.     I have discussed this patient's plan of care and discharge plan at IDT rounds today with Case Management, Nursing, Nursing leadership, and other members of the IDT team.    Consultants/Specialty  None    Code Status  Full Code    Disposition  Patient is not medically cleared for discharge.   Anticipate discharge to to home with close outpatient follow-up.  I have placed the appropriate orders for post-discharge needs.    Review of Systems  Constitutional:  Negative for chills, diaphoresis, fever and weight loss.   HENT:  Negative for congestion, sinus pain and sore throat.    Respiratory:  Negative for cough, hemoptysis, sputum production, shortness of breath and wheezing.    Cardiovascular:  Negative for chest pain, palpitations, orthopnea, claudication, leg swelling and PND.   Gastrointestinal:  Positive for abdominal pain and constipation. Negative for blood in stool,  diarrhea, heartburn, melena, nausea and vomiting.   Genitourinary:  Negative for dysuria, flank pain, frequency, hematuria and urgency.   Musculoskeletal:  Positive for back pain, joint pain and myalgias. Negative for falls and neck pain.        L hip pain and R shoulder pain   Skin:  Negative for itching and rash.   Neurological:  Negative for dizziness, seizures, loss of consciousness and headaches.      Weakness with flexion L hip     Physical Exam  Temp:  [36.3 °C (97.4 °F)-36.9 °C (98.4 °F)] 36.9 °C (98.4 °F)  Pulse:  [65-80] 74  Resp:  [16-20] 20  BP: (117-139)/(53-80) 139/76  SpO2:  [92 %-97 %] 95 %    Physical Exam  Vitals and nursing note reviewed.   Constitutional:       General: He is not in acute distress.     Appearance: Normal appearance. He is normal weight. He is not toxic-appearing or diaphoretic.      Comment: pain with movement.   Cardiovascular:      Rate and Rhythm: Normal rate and regular rhythm.      Heart sounds: Normal heart sounds. No murmur heard.   Pulmonary:      Effort: Pulmonary effort is normal. No respiratory distress.      Breath sounds: Normal breath sounds. No stridor. No wheezing, rhonchi or rales.   Abdominal:      General: Abdomen is flat. Bowel sounds are normal. There is no distension.      Palpations: Abdomen is soft. There is no mass.      Tenderness: There is abdominal tenderness.      Comments: LLQ pain with palpation. Drain present, draining purulent fluid.   Musculoskeletal:         General: No tenderness, deformity or signs of injury.      Right lower leg: No edema.      Left lower leg: No edema.      Comment: Continued pain with L leg movement.     Fluids    Intake/Output Summary (Last 24 hours) at 2/11/2023 0536  Last data filed at 2/11/2023 0351  Gross per 24 hour   Intake 840 ml   Output 55 ml   Net 785 ml       Laboratory  Recent Labs     02/09/23  0359 02/10/23  0200 02/11/23  0234   WBC 9.0 7.8 7.5   RBC 3.86* 3.73* 3.68*   HEMOGLOBIN 11.1* 10.6* 10.6*    HEMATOCRIT 33.6* 32.6* 32.4*   MCV 87.0 87.4 88.0   MCH 28.8 28.4 28.8   MCHC 33.0* 32.5* 32.7*   RDW 42.1 42.1 42.4   PLATELETCT 417 385 390   MPV 9.1 9.3 9.3     Recent Labs     02/09/23  0359 02/10/23  0200 02/11/23  0234   SODIUM 132* 134* 133*   POTASSIUM 3.7 3.6 4.1   CHLORIDE 95* 98 98   CO2 26 27 26   GLUCOSE 243* 257* 303*   BUN 17 14 14   CREATININE 0.94 0.95 0.88   CALCIUM 8.0* 8.1* 8.0*                   Imaging  CT-ABDOMEN-PELVIS WITH    Result Date: 2/5/2023  1.  There is a nonspecific hypodense mass versus complex fluid collection in the left medial hemipelvis abutting the left iliopsoas musculature just medial to the acetabulum which extends into the left inguinal region. The overall appearance on CT is more suggestive of an infectious or inflammatory process rather than necrotic metastatic disease in this patient with known prostate cancer. This does not involve the left hip joint. 2.  There is otherwise no metastatic adenopathy in the abdomen or pelvis. 3.  Incidental simple left renal cortical cyst. No follow up imaging is recommended per consensus guidelines of the 2019 ACR Incidental Findings Committee for probably benign incidental simple appearing renal cystic lesion(s) based on imaging criteria.    CT-DRAIN-PERITONEAL    Result Date: 2/8/2023  1.  CT GUIDED PLACEMENT OF A PERCUTANEOUS DRAINAGE CATHETER IN A LEFT PELVIC SIDEWALL FLUID COLLECTION. 2.  THE CURRENT PLAN IS TO MONITOR DRAINAGE OUTPUT AND OBTAIN A FOLLOWUP CT SCAN IN 5-7 DAYS IF CLINICALLY INDICATED.       Assessment/Plan  Problem Representation:    *Pelvic abscess (HCC)- (present on admission)  Assessment & Plan  Ddx secondary to diverticulitis, UTI, or hematogenous or lymphatic seeding from a distant site (risk factor of diabetes)   -CT A/P 2/5 noted nonspecific hypodense mass vs complex fluid collection in L medial hemipelvis abutting L iliopsoas.   -Colonoscopy 2018 noted few colonic diverticuli. Possible source.   -2/7  ordered lactic acid to assess for bowel ischemia, lactic acid WNL.   Plan:   -zosyn.  -PRN 5-7.5 Oxycodone   -IR drain placed on 2/8, draining purulent fluid.  Growing group B strep pending speciation and sensitivities  -will repeat imaging based on drainage output     Exanthematous rash   Assessment & Plan  -Rash most consistent with topical irritation, exanthematous drug eruption, vs folliculitis. Dced ceftriaxone and metronidazole and rash improved; started meropenem. May be folliculitis in the setting of significant sweating as well.       Normocytic anemia  Assessment & Plan  -On admission Hgb 11.7. Only other comparison lab available is from 4/20/2021 and was also low at 12.1 .   -anemia of chronic disease. Labs notable for low iron, low tibc, elevated ferritin.      Groin pain, left  Assessment & Plan  -Likely related to abscess. However, possible hernia; pt unable to stand so cannot do accurate hernia exam at this time. Will monitor for now.      Chronic right shoulder pain  Assessment & Plan  -PRN diclofenac gel  -Scheduled lidocaine patches      Elevated lipase  Assessment & Plan  -Pt does not meet pancreatitis criteria, will monitor for now.      Hypokalemia - resolved   Assessment & Plan  Continue to monitor, replete as necessary.       Benign essential HTN- (present on admission)  Assessment & Plan  -Prinzide not available, ordered seperately as Lisinopril 20 and HCTZ 12.5   -Continue home metoprolol XL 50      Controlled type 2 diabetes mellitus without complication, without long-term current use of insulin (HCC)- (present on admission)  Assessment & Plan  -home meds: metformin 1000 and Pioglitazone 30.   Plan:   -Holding home meds   -ISS   -Glargine 7 units QAM   -Hypoglycemia protocol      Mixed hyperlipidemia- (present on admission)  Assessment & Plan  -continue home atorvastatin      History of prostate cancer  Assessment & Plan  -Adenocarcinoma of prostate. S/P RA RP and bilat PLND on 4/19/2021.         VTE prophylaxis: heparin ppx (held for now pending procedure)     I have performed a physical exam and reviewed and updated ROS and Plan today (2/11/2023). In review of yesterday's note (2/10/2023), there are no changes except as documented above.

## 2023-02-11 NOTE — CARE PLAN
The patient is Watcher - Medium risk of patient condition declining or worsening    Shift Goals  Clinical Goals: monitor drain output, pain management  Patient Goals: pain management, glucose control  Family Goals: GERALD    Progress made toward(s) clinical / shift goals:    Problem: Knowledge Deficit - Standard  Goal: Patient and family/care givers will demonstrate understanding of plan of care, disease process/condition, diagnostic tests and medications  Outcome: Progressing  Note: Pt states understanding about his blood sugars being elevated while hospitalized with an infection     Problem: Pain - Standard  Goal: Alleviation of pain or a reduction in pain to the patient’s comfort goal  Outcome: Progressing  Note: Experiencing less pain this shift per pt report     EOS: Pt very concerned about his blood sugars being elevated. Explained how blood sugars can be uncontrolled with an infection present. Pt would like to speak to a doctor more about this, will pass along to daytime RN to relay.

## 2023-02-11 NOTE — NON-PROVIDER
This note is for learning purposes only and not for billing. Note written by Joao Leonardo, Medical Student    Daily Progress Note:   Note Author: Joao Leonardo, Student  Date: 2/11/2023    Date of Admission: 2/5/2023  Attending: Dr. Jose Hernández  Medical Student: Joao Leonardo, MS3    Reason for interval visit  (Principal Problem)   Pelvic abscess in male (HCC)left groin pain, left hip pain, left leg weakness, loss of appetite, constipation    Chief Complaint:   Srinivasan Cruz is a 67 y.o. male with a PMH of prostate cancer, DM2, hyperlipidemia, and HTN who was admitted on 2/5/2023 with left groin pain, left hip pain, loss of appetite, and constipation with suspected diverticular abscess.    Subjective:  No acute overnight events. Patient's pain and mobility continually improve. He now has a 4/10 dull pain. ROS is negative. He has been concerned about his glucose control and will continue to communicate and manage.    Consultants/Specialty:  IR.  ID.    Review of Systems:    ROS  See subjective.    Objective Data:    Vitals:   Temp:  [36.5 °C (97.7 °F)-36.9 °C (98.4 °F)] (P) 36.6 °C (97.8 °F)  Pulse:  [69-80] (P) 69  Resp:  [16-20] (P) 18  BP: (117-139)/(53-76) 139/76  SpO2:  [92 %-97 %] (P) 95 %RA    Intake/Output Summary (Last 24 hours) at 2/11/2023 0746  Last data filed at 2/11/2023 0351  Gross per 24 hour   Intake 840 ml   Output 10 ml   Net 830 ml       Vitals have been reviewed and are accurate.    Physical Exam:  General: Ill appearing in no acute distress, resting on arrival to room  HEENT: Normocephalic, atraumatic, normal thyroid  Cardiovascular: RRR, no murmurs, gallops, or rubs  Pulmonary: CTAB, symmetrical chest expansion, no rales, rhonchi, or wheezes  Abdominal: Normoactive bowel sounds, LLQ tenderness, distension, no rebound tenderness, no masses, no signs of trauma, erythema. No CVA tenderness.  Extremities: Moves all spontaneously, no peripheral edema, radial and pedal  pulses 2+ bilateral.   Neurological: Alert and oriented, CN III-XII intact, reduced left hip strength limited to pain not neuropathy, 5/5 left plantar flexion. Normal sensory exams.    Lab Results:  Recent Labs     02/09/23  0359 02/10/23  0200 02/11/23  0234   WBC 9.0 7.8 7.5   RBC 3.86* 3.73* 3.68*   HEMOGLOBIN 11.1* 10.6* 10.6*   HEMATOCRIT 33.6* 32.6* 32.4*   MCV 87.0 87.4 88.0   MCH 28.8 28.4 28.8   RDW 42.1 42.1 42.4   PLATELETCT 417 385 390   MPV 9.1 9.3 9.3   NEUTSPOLYS  --  75.10*  --    LYMPHOCYTES  --  11.10*  --    MONOCYTES  --  11.00  --    EOSINOPHILS  --  1.90  --    BASOPHILS  --  0.40  --      Recent Labs     02/09/23  0359 02/10/23  0200 02/11/23  0234   SODIUM 132* 134* 133*   POTASSIUM 3.7 3.6 4.1   CHLORIDE 95* 98 98   CO2 26 27 26   BUN 17 14 14   CREATININE 0.94 0.95 0.88   CALCIUM 8.0* 8.1* 8.0*     Estimated GFR/CRCL = Estimated Creatinine Clearance: 85.6 mL/min (by C-G formula based on SCr of 0.88 mg/dL).  Recent Labs     02/09/23  0359 02/10/23  0200 02/11/23  0234   GLUCOSE 243* 257* 303*                 No results for input(s): INR, APTT, FIBRINOGEN in the last 72 hours.    Invalid input(s): DIMER    Labs have been reviewed and are accurate.    Microbiology Results:  Results       Procedure Component Value Units Date/Time    CULTURE WOUND W/ GRAM STAIN [[id number]]  (Abnormal) Collected: 02/08/23 1540    Order Status: Completed Specimen: Wound Updated: 02/10/23 1102     Significant Indicator POS     Source WND     Site LLQ abdominal abscess     Culture Result -     Gram Stain Result Many WBCs.  Moderate Gram positive cocci.       Culture Result Streptococcus agalactiae (Group B)  Heavy growth      Anaerobic Culture [[id number]] Collected: 02/08/23 1540    Order Status: Completed Specimen: Wound Updated: 02/10/23 1102     Significant Indicator NEG     Source WND     Site LLQ abdominal abscess     Culture Result Culture in progress.    MRSA By PCR (Amp) [[id number]] Collected: 02/09/23 1024     Order Status: Completed Specimen: Respirate from Nares Updated: 02/09/23 1349     MRSA by PCR Negative    Narrative:      Collected By: [id number] JUHI COX    GRAM STAIN [[id number]] Collected: 02/08/23 1540    Order Status: Completed Specimen: Wound Updated: 02/09/23 0332     Significant Indicator .     Source WND     Site LLQ abdominal abscess     Gram Stain Result Many WBCs.  Moderate Gram positive cocci.      FLUID CULTURE W/GRAM STAIN [[id number]]     Order Status: No result Specimen: Body Fluid from Peritoneal Fluid     Aerobic/Anaerobic Culture (Surgery) [[id number]]     Order Status: No result Specimen: Other from Abdominal     Blood Culture,Hold [[id number]] Collected: 02/05/23 1335    Order Status: Completed Updated: 02/05/23 1752     Blood Culture Hold Collected    Blood Culture,Hold [[id number]] Collected: 02/05/23 1016    Order Status: Completed Updated: 02/05/23 1303     Blood Culture Hold Collected    URINALYSIS (UA) [[id number]] Collected: 02/05/23 1205    Order Status: Completed Specimen: Urine Updated: 02/05/23 1219     Color Yellow     Character Clear     Specific Gravity 1.023     Ph 5.0     Glucose Negative mg/dL      Ketones Negative mg/dL      Protein Negative mg/dL      Bilirubin Negative     Urobilinogen, Urine 1.0     Nitrite Negative     Leukocyte Esterase Negative     Occult Blood Negative     Micro Urine Req see below     Comment: Microscopic examination not performed when specimen is clear  and chemically negative for protein, blood, leukocyte esterase  and nitrite.                 Imaging Results:  CT-DRAIN-PERITONEAL   Final Result      1.  CT GUIDED PLACEMENT OF A PERCUTANEOUS DRAINAGE CATHETER IN A LEFT PELVIC SIDEWALL FLUID COLLECTION.   2.  THE CURRENT PLAN IS TO MONITOR DRAINAGE OUTPUT AND OBTAIN A FOLLOWUP CT SCAN IN 5-7 DAYS IF CLINICALLY INDICATED.      CT-ABDOMEN-PELVIS WITH   Final Result      1.  There is a nonspecific hypodense mass versus complex fluid collection in the  left medial hemipelvis abutting the left iliopsoas musculature just medial to the acetabulum which extends into the left inguinal region. The overall appearance on CT is    more suggestive of an infectious or inflammatory process rather than necrotic metastatic disease in this patient with known prostate cancer. This does not involve the left hip joint.   2.  There is otherwise no metastatic adenopathy in the abdomen or pelvis.   3.  Incidental simple left renal cortical cyst. No follow up imaging is recommended per consensus guidelines of the 2019 ACR Incidental Findings Committee for probably benign incidental simple appearing renal cystic lesion(s) based on imaging criteria.          Imaging has been reviewed and are accurate.    EKG  No results found for this or any previous visit.    Current Medications    Current Facility-Administered Medications:     insulin GLARGINE (Lantus,Semglee) injection, 5 Units, Subcutaneous, QAM INSULIN, Hero Magallanes D.O., 5 Units at 02/11/23 0449    insulin lispro (AdmeLOG,HumaLOG) injection, 1-6 Units, Subcutaneous, 4X/DAY Isidro SURESH M.D., 2 Units at 02/10/23 2308    [COMPLETED] piperacillin-tazobactam (Zosyn) 3.375 g in  mL IVPB, 3.375 g, Intravenous, Once, Stopped at 02/09/23 1832 **AND** piperacillin-tazobactam (Zosyn) 3.375 g in  mL IVPB, 3.375 g, Intravenous, Q8HRS, Poncho Madison D.O., Last Rate: 25 mL/hr at 02/11/23 0551, 3.375 g at 02/11/23 0551    HYDROmorphone (Dilaudid) injection 0.5 mg, 0.5 mg, Intravenous, Q4HRS PRN, Hero Magallanes D.O.    cetirizine (ZYRTEC) tablet 10 mg, 10 mg, Oral, DAILY, Poncho Madison D.O., 10 mg at 02/11/23 0449    oxyCODONE immediate-release (ROXICODONE) tablet 5 mg, 5 mg, Oral, Q4HRS PRN, Hero Magallanes D.O., 5 mg at 02/11/23 0449    oxyCODONE immediate-release (ROXICODONE) tablet 7.5 mg, 7.5 mg, Oral, Q4HRS PRN, Hero Magallanes D.O., 7.5 mg at 02/10/23 2215    senna-docusate (PERICOLACE or SENOKOT S)  8.6-50 MG per tablet 2 Tablet, 2 Tablet, Oral, BID, 2 Tablet at 02/11/23 0449 **AND** polyethylene glycol/lytes (MIRALAX) PACKET 1 Packet, 1 Packet, Oral, QDAY PRN, 1 Packet at 02/06/23 1753 **AND** magnesium hydroxide (MILK OF MAGNESIA) suspension 30 mL, 30 mL, Oral, QDAY PRN **AND** bisacodyl (DULCOLAX) suppository 10 mg, 10 mg, Rectal, QDAY PRN, Hero Magallanes D.O.    heparin injection 5,000 Units, 5,000 Units, Subcutaneous, Q8HRS, NAV ShannonOSolo, 5,000 Units at 02/11/23 0500    acetaminophen (Tylenol) tablet 650 mg, 650 mg, Oral, Q6HRS PRN, Hero Magallanes D.O., 650 mg at 02/06/23 0255    [DISCONTINUED] insulin regular (HumuLIN R,NovoLIN R) injection, 1-6 Units, Subcutaneous, Q6HRS, 2 Units at 02/10/23 0639 **AND** POC blood glucose manual result, , , Q6H **AND** NOTIFY MD and PharmD, , , Once **AND** Administer 20 grams of glucose (approximately 8 ounces of fruit juice) every 15 minutes PRN FSBG less than 70 mg/dL, , , PRN **AND** dextrose 10 % BOLUS 25 g, 25 g, Intravenous, Q15 MIN PRN, Hero Magallanes D.O.    atorvastatin (LIPITOR) tablet 40 mg, 40 mg, Oral, Q EVENING, NAV ShannonOSolo, 40 mg at 02/10/23 1633    lisinopril (PRINIVIL) tablet 20 mg, 20 mg, Oral, Q DAY, NAV ShannonOSolo, 20 mg at 02/11/23 0449    hydroCHLOROthiazide (HYDRODIURIL) tablet 12.5 mg, 12.5 mg, Oral, Q DAY, NAV ShannonO., 12.5 mg at 02/11/23 0449    metoprolol SR (TOPROL XL) tablet 50 mg, 50 mg, Oral, Q DAY, Hero Magallanes D.O., 50 mg at 02/11/23 0449    lidocaine (LIDODERM) 5 % 1 Patch, 1 Patch, Transdermal, Q24HR, Hero Magallanes D.O., 1 Patch at 02/08/23 1708    simethicone (Mylicon) chewable tablet 125 mg, 125 mg, Oral, TID PRN, Arnaldo Terrazas M.D., 125 mg at 02/10/23 1643    Problem Representation:   Srinivasan Cruz is a 67 y.o. male with a PMH of prostate cancer, DM2, hyperlipidemia, and HTN who was admitted on 2/5/2023 with left groin pain, left hip pain, loss of appetite, and  constipation with suspected ilopsoas abscess. He has had two weeks of worsening fatigue, fever, chills, with associated left groin and hip pain and weakness. On physical exam, there are no signs of trauma but he does have LLQ abdominal tenderness with mild guarding and distension and he is weak in the left hip. Initial abdominal CT showed nonspecific hypondense mass vs. Complex fluid collection in the left pelvic/psoas region with no metastatic adenopathy. Urinalysis was normal.     #Pelvic Abscess:              -Unknown etiology. Colonoscopy in 2018 showed diverticulosis but patient has been asymptomatic. Also had robotic prostatectomy in 2021 with no signs of metastasis. Likely diverticulosis.              -Interventional Radiology consult for drainage.              -ID consult, appreciate recs.              -Tylenol and oxycodone prn for pain.              -IVF LR.              -NPO.              -2/8, IR drained 40mL, had another 30mL overnight of purulent fluid.   -Monitor drain output.              -Start Zosyn.              -Pending cultures and perform CT in 5-7 days.              -2/9, culture positive streptococcus agalactiae, pending anaerobic culture, MRSA negative.              -Possible surgery consult if worsening.              -CTM for worsening symptoms/sepsis.              -Patient will need colonoscopy outpatient.     #DM2:              -2/6, A1C: 10.3  -Diabetes medication history unclear due to the patient having tried multiple meds and stopping them abruptly due to financial reasons. Only meds taken at home are metformin and insulin for the past six months. Patient said he is compliant.  -Glargine and Lispro.  -Keep glucose 140-180, no sliding scale at glucose below 200.              -CTM.     #Anemia of Chronic Disease:              -2/5, H&H: 11.7/35.6              -2/6, H&H: 10.9/33.0              -2/6, elevated ferritin, low iron, low TIBC, low % saturation.              -CTM      #Hyperlipidemia:              -Continue home Atorvastatin.     #Hypokalemia:              -2/5, potassium 3.1              -Give supplementation.              -CTM.     VTE Prophylaxis:  Heparin.     Disposition:  Inpatient.

## 2023-02-12 PROBLEM — R21 RASH AND NONSPECIFIC SKIN ERUPTION: Status: ACTIVE | Noted: 2023-02-12

## 2023-02-12 LAB
ANION GAP SERPL CALC-SCNC: 7 MMOL/L (ref 7–16)
BACTERIA SPEC ANAEROBE CULT: NORMAL
BUN SERPL-MCNC: 14 MG/DL (ref 8–22)
CALCIUM SERPL-MCNC: 8.5 MG/DL (ref 8.5–10.5)
CHLORIDE SERPL-SCNC: 97 MMOL/L (ref 96–112)
CO2 SERPL-SCNC: 28 MMOL/L (ref 20–33)
CREAT SERPL-MCNC: 0.92 MG/DL (ref 0.5–1.4)
GFR SERPLBLD CREATININE-BSD FMLA CKD-EPI: 90 ML/MIN/1.73 M 2
GLUCOSE BLD STRIP.AUTO-MCNC: 218 MG/DL (ref 65–99)
GLUCOSE BLD STRIP.AUTO-MCNC: 296 MG/DL (ref 65–99)
GLUCOSE BLD STRIP.AUTO-MCNC: 298 MG/DL (ref 65–99)
GLUCOSE BLD STRIP.AUTO-MCNC: 341 MG/DL (ref 65–99)
GLUCOSE SERPL-MCNC: 269 MG/DL (ref 65–99)
POTASSIUM SERPL-SCNC: 4.1 MMOL/L (ref 3.6–5.5)
SIGNIFICANT IND 70042: NORMAL
SITE SITE: NORMAL
SODIUM SERPL-SCNC: 132 MMOL/L (ref 135–145)
SOURCE SOURCE: NORMAL

## 2023-02-12 PROCEDURE — 700102 HCHG RX REV CODE 250 W/ 637 OVERRIDE(OP): Performed by: STUDENT IN AN ORGANIZED HEALTH CARE EDUCATION/TRAINING PROGRAM

## 2023-02-12 PROCEDURE — 770001 HCHG ROOM/CARE - MED/SURG/GYN PRIV*

## 2023-02-12 PROCEDURE — A9270 NON-COVERED ITEM OR SERVICE: HCPCS | Performed by: STUDENT IN AN ORGANIZED HEALTH CARE EDUCATION/TRAINING PROGRAM

## 2023-02-12 PROCEDURE — 82962 GLUCOSE BLOOD TEST: CPT

## 2023-02-12 PROCEDURE — 700102 HCHG RX REV CODE 250 W/ 637 OVERRIDE(OP)

## 2023-02-12 PROCEDURE — A9270 NON-COVERED ITEM OR SERVICE: HCPCS

## 2023-02-12 PROCEDURE — 700105 HCHG RX REV CODE 258: Performed by: STUDENT IN AN ORGANIZED HEALTH CARE EDUCATION/TRAINING PROGRAM

## 2023-02-12 PROCEDURE — 700111 HCHG RX REV CODE 636 W/ 250 OVERRIDE (IP)

## 2023-02-12 PROCEDURE — 80048 BASIC METABOLIC PNL TOTAL CA: CPT

## 2023-02-12 PROCEDURE — 700111 HCHG RX REV CODE 636 W/ 250 OVERRIDE (IP): Performed by: STUDENT IN AN ORGANIZED HEALTH CARE EDUCATION/TRAINING PROGRAM

## 2023-02-12 PROCEDURE — 99232 SBSQ HOSP IP/OBS MODERATE 35: CPT | Mod: GC | Performed by: INTERNAL MEDICINE

## 2023-02-12 PROCEDURE — 36415 COLL VENOUS BLD VENIPUNCTURE: CPT

## 2023-02-12 RX ADMIN — OXYCODONE HYDROCHLORIDE 7.5 MG: 5 TABLET ORAL at 06:01

## 2023-02-12 RX ADMIN — HEPARIN SODIUM 5000 UNITS: 5000 INJECTION INTRAVENOUS; SUBCUTANEOUS at 13:38

## 2023-02-12 RX ADMIN — HEPARIN SODIUM 5000 UNITS: 5000 INJECTION INTRAVENOUS; SUBCUTANEOUS at 04:41

## 2023-02-12 RX ADMIN — PIPERACILLIN AND TAZOBACTAM 3.38 G: 3; .375 INJECTION, POWDER, LYOPHILIZED, FOR SOLUTION INTRAVENOUS; PARENTERAL at 04:47

## 2023-02-12 RX ADMIN — PIPERACILLIN AND TAZOBACTAM 3.38 G: 3; .375 INJECTION, POWDER, LYOPHILIZED, FOR SOLUTION INTRAVENOUS; PARENTERAL at 13:51

## 2023-02-12 RX ADMIN — PIPERACILLIN AND TAZOBACTAM 3.38 G: 3; .375 INJECTION, POWDER, LYOPHILIZED, FOR SOLUTION INTRAVENOUS; PARENTERAL at 21:09

## 2023-02-12 RX ADMIN — HYDROCHLOROTHIAZIDE 12.5 MG: 25 TABLET ORAL at 04:41

## 2023-02-12 RX ADMIN — INSULIN LISPRO 2 UNITS: 100 INJECTION, SOLUTION INTRAVENOUS; SUBCUTANEOUS at 09:19

## 2023-02-12 RX ADMIN — CETIRIZINE HYDROCHLORIDE 10 MG: 10 TABLET, FILM COATED ORAL at 04:42

## 2023-02-12 RX ADMIN — INSULIN LISPRO 4 UNITS: 100 INJECTION, SOLUTION INTRAVENOUS; SUBCUTANEOUS at 21:09

## 2023-02-12 RX ADMIN — INSULIN LISPRO 3 UNITS: 100 INJECTION, SOLUTION INTRAVENOUS; SUBCUTANEOUS at 13:38

## 2023-02-12 RX ADMIN — ATORVASTATIN CALCIUM 40 MG: 40 TABLET, FILM COATED ORAL at 17:39

## 2023-02-12 RX ADMIN — METOPROLOL SUCCINATE 50 MG: 50 TABLET, EXTENDED RELEASE ORAL at 04:42

## 2023-02-12 RX ADMIN — OXYCODONE HYDROCHLORIDE 5 MG: 5 TABLET ORAL at 01:49

## 2023-02-12 RX ADMIN — LISINOPRIL 20 MG: 20 TABLET ORAL at 04:42

## 2023-02-12 RX ADMIN — SENNOSIDES AND DOCUSATE SODIUM 2 TABLET: 50; 8.6 TABLET ORAL at 04:42

## 2023-02-12 RX ADMIN — INSULIN LISPRO 3 UNITS: 100 INJECTION, SOLUTION INTRAVENOUS; SUBCUTANEOUS at 17:47

## 2023-02-12 RX ADMIN — NYSTATIN: 100000 POWDER TOPICAL at 17:40

## 2023-02-12 RX ADMIN — OXYCODONE HYDROCHLORIDE 7.5 MG: 5 TABLET ORAL at 17:58

## 2023-02-12 RX ADMIN — NYSTATIN: 100000 POWDER TOPICAL at 04:45

## 2023-02-12 RX ADMIN — HEPARIN SODIUM 5000 UNITS: 5000 INJECTION INTRAVENOUS; SUBCUTANEOUS at 21:10

## 2023-02-12 ASSESSMENT — PAIN DESCRIPTION - PAIN TYPE
TYPE: ACUTE PAIN

## 2023-02-12 NOTE — ASSESSMENT & PLAN NOTE
-Rash most consistent with topical irritation, exanthematous drug eruption, vs folliculitis. Dced ceftriaxone and metronidazole and rash improved; started meropenem. May be folliculitis in the setting of significant sweating as well.

## 2023-02-12 NOTE — CARE PLAN
The patient is Stable - Low risk of patient condition declining or worsening    Shift Goals  Clinical Goals: monitor drain, pain control, IVF  Patient Goals: pain control, go home  Family Goals: GERALD      Problem: Knowledge Deficit - Standard  Goal: Patient and family/care givers will demonstrate understanding of plan of care, disease process/condition, diagnostic tests and medications  Outcome: Progressing  Note: Pt educated on current POC, expected outcomes and goals, and new medications ordered. All questions and concerns have been answered at this time.       Problem: Pain - Standard  Goal: Alleviation of pain or a reduction in pain to the patient’s comfort goal  Outcome: Progressing  Note: Current pain regimen effective on getting patients pain level under control as needed, per patient. Educated on alternative pain relief therapies such as music, games, heat/cold, TV as distraction, walking in the halls or in room, and controlled breathing techniques.

## 2023-02-12 NOTE — PROGRESS NOTES
[facility] Internal Medicine Daily Progress Note    Date of Service  2/12/2023    R Team: R IM White Team   Attending: Jose Hernández M.d.  Senior Resident: Dr. Madison  Intern:  Dr. Magallanes  Contact Number: [phone]    Chief Complaint  Srinivasan Cruz is a 67 y.o. male admitted 2/5/2023 with groin pain, loss of appetite, hip pain and constipation.    Hospital Course  Srinivasan Cruz is a 67 y.o. male with PMH of type 2 diabetes, HLD, and HTN who presented 2/5/2023 with chief complaint of 2 weeks of hip pain. He initially presented to an urgent care in [place] and was prescribed a muscle relaxant for suspected sciatica. When his pain continued he decided to present to the ED. CT abdomen/pelvis noted nonspecific hypodense mass vs complex fluid collection in the L medial hemipelvis abutting L iliopsoas. IR was consulted for drain placement. Cultures resulted positive for GBS.     Interval Problem Update  This morning Mr Cruz said he was still improving. He had been able to walk up and down the [place] with decreased leg pain. Drain still draining, about 20cc output so far today.   -Increased glargine to 20 QAM   -Will repeat imaging when drain has decreased output.     I have discussed this patient's plan of care and discharge plan at IDT rounds today with Case Management, Nursing, Nursing leadership, and other members of the IDT team.    Consultants/Specialty  None    Code Status  Full Code    Disposition  Patient is not medically cleared for discharge.   Anticipate discharge to to home with close outpatient follow-up.  I have placed the appropriate orders for post-discharge needs.    Review of Systems  Constitutional:  Negative for chills, diaphoresis, fever and weight loss.   HENT:  Negative for congestion, sinus pain and sore throat.    Respiratory:  Negative for cough, hemoptysis, sputum production, shortness of breath and wheezing.    Cardiovascular:  Negative for chest pain, palpitations,  orthopnea, claudication, leg swelling and PND.   Gastrointestinal:  Positive for abdominal pain. Negative for blood in stool, constipation, diarrhea, heartburn, melena, nausea and vomiting.   Genitourinary:  Negative for dysuria, flank pain, frequency, hematuria and urgency.   Musculoskeletal:  Positive for back pain, joint pain. Negative for myalgias,  falls and neck pain.        L hip pain and R shoulder pain   Skin:  Negative for itching and rash.   Neurological:  Negative for dizziness, seizures, loss of consciousness and headaches.     Physical Exam  Temp:  [36.5 °C (97.7 °F)-36.9 °C (98.4 °F)] 36.9 °C (98.4 °F)  Pulse:  [67-77] 73  Resp:  [18] 18  BP: (118-142)/(64-82) 134/82  SpO2:  [93 %-95 %] 95 %    Physical Exam  Vitals and nursing note reviewed.   Constitutional:       General: He is not in acute distress.     Appearance: Normal appearance. He is normal weight. He is not toxic-appearing or diaphoretic.   Cardiovascular:      Rate and Rhythm: Normal rate and regular rhythm.      Heart sounds: Normal heart sounds. No murmur heard.   Pulmonary:      Effort: Pulmonary effort is normal. No respiratory distress.      Breath sounds: Normal breath sounds. No stridor. No wheezing, rhonchi or rales.   Abdominal:      General: Abdomen is flat. Bowel sounds are normal. There is no distension.      Palpations: Abdomen is soft. There is no mass.      Tenderness: There is abdominal tenderness.      Comments: LLQ discomfort with palpation (decreased from previous). Drain present, draining purulent fluid.   Musculoskeletal:         General: No tenderness, deformity or signs of injury.      Right lower leg: No edema.      Left lower leg: No edema.      Comment: Continued pain with L leg movement.     Fluids    Intake/Output Summary (Last 24 hours) at 2/12/2023 0717  Last data filed at 2/12/2023 0300  Gross per 24 hour   Intake 480 ml   Output 25 ml   Net 455 ml       Laboratory  Recent Labs     02/10/23  0200  02/11/23  0234   WBC 7.8 7.5   RBC 3.73* 3.68*   HEMOGLOBIN 10.6* 10.6*   HEMATOCRIT 32.6* 32.4*   MCV 87.4 88.0   MCH 28.4 28.8   MCHC 32.5* 32.7*   RDW 42.1 42.4   PLATELETCT 385 390   MPV 9.3 9.3     Recent Labs     02/10/23  0200 02/11/23  0234 02/12/23  0139   SODIUM 134* 133* 132*   POTASSIUM 3.6 4.1 4.1   CHLORIDE 98 98 97   CO2 27 26 28   GLUCOSE 257* 303* 269*   BUN 14 14 14   CREATININE 0.95 0.88 0.92   CALCIUM 8.1* 8.0* 8.5                   Imaging  CT-ABDOMEN-PELVIS WITH    Result Date: 2/5/2023  1.  There is a nonspecific hypodense mass versus complex fluid collection in the left medial hemipelvis abutting the left iliopsoas musculature just medial to the acetabulum which extends into the left inguinal region. The overall appearance on CT is more suggestive of an infectious or inflammatory process rather than necrotic metastatic disease in this patient with known prostate cancer. This does not involve the left hip joint. 2.  There is otherwise no metastatic adenopathy in the abdomen or pelvis. 3.  Incidental simple left renal cortical cyst. No follow up imaging is recommended per consensus guidelines of the 2019 ACR Incidental Findings Committee for probably benign incidental simple appearing renal cystic lesion(s) based on imaging criteria.    CT-DRAIN-PERITONEAL    Result Date: 2/8/2023  1.  CT GUIDED PLACEMENT OF A PERCUTANEOUS DRAINAGE CATHETER IN A LEFT PELVIC SIDEWALL FLUID COLLECTION. 2.  THE CURRENT PLAN IS TO MONITOR DRAINAGE OUTPUT AND OBTAIN A FOLLOWUP CT SCAN IN 5-7 DAYS IF CLINICALLY INDICATED.       Assessment/Plan  Problem Representation:  *Pelvic abscess (HCC)- (present on admission)  Assessment & Plan  Ddx secondary to diverticulitis, UTI, or hematogenous or lymphatic seeding from a distant site (risk factor of diabetes)   -CT A/P 2/5 noted nonspecific hypodense mass vs complex fluid collection in L medial hemipelvis abutting L iliopsoas.   -Colonoscopy 2018 noted few colonic  diverticuli. Possible source.   -2/7 ordered lactic acid to assess for bowel ischemia, lactic acid WNL.   Plan:   -zosyn.  -PRN 5-7.5 Oxycodone   -IR drain placed on 2/8, draining purulent fluid.  Growing group B strep pending speciation and sensitivities  -will repeat imaging based on drainage output     Exanthematous rash   Assessment & Plan  -Rash most consistent with topical irritation, exanthematous drug eruption, vs folliculitis. Dced ceftriaxone and metronidazole and rash improved; started meropenem. May be folliculitis in the setting of significant sweating as well.       Normocytic anemia  Assessment & Plan  -On admission Hgb 11.7. Only other comparison lab available is from 4/20/2021 and was also low at 12.1 .   -anemia of chronic disease. Labs notable for low iron, low tibc, elevated ferritin.      Groin pain, left  Assessment & Plan  -Likely related to abscess. However, possible hernia; pt unable to stand so cannot do accurate hernia exam at this time. Will monitor for now.      Chronic right shoulder pain  Assessment & Plan  -PRN diclofenac gel  -Scheduled lidocaine patches      Elevated lipase  Assessment & Plan  -Pt does not meet pancreatitis criteria, will monitor for now.      Hypokalemia - resolved   Assessment & Plan  Continue to monitor, replete as necessary.       Benign essential HTN- (present on admission)  Assessment & Plan  -Prinzide not available, ordered seperately as Lisinopril 20 and HCTZ 12.5   -Continue home metoprolol XL 50      Controlled type 2 diabetes mellitus without complication, without long-term current use of insulin (HCC)- (present on admission)  Assessment & Plan  -home meds: metformin 1000 and Pioglitazone 30.   Plan:   -Holding home meds   -ISS   -Glargine 7 units QAM   -Hypoglycemia protocol      Mixed hyperlipidemia- (present on admission)  Assessment & Plan  -continue home atorvastatin      History of prostate cancer  Assessment & Plan  -Adenocarcinoma of prostate. S/P  BRITTNEY RP and best PLND on 4/19/2021.        VTE prophylaxis: heparin ppx (held for now pending procedure)     I have performed a physical exam and reviewed and updated ROS and Plan today (2/12/2023). In review of yesterday's note (2/11/2023), there are no changes except as documented above.

## 2023-02-12 NOTE — CARE PLAN
The patient is Stable - Low risk of patient condition declining or worsening    Shift Goals  Clinical Goals: Monitor drain output  Patient Goals: Pain management  Family Goals: GERALD    Progress made toward(s) clinical / shift goals:    Problem: Pain - Standard  Goal: Alleviation of pain or a reduction in pain to the patient’s comfort goal  Outcome: Progressing   Patient will maintain pain of 5/10 or lower through 2/12/2023  Problem: Bowel Elimination  Goal: Establish and maintain regular bowel function  Outcome: Progressing   Patient will maintain a regular bowel pattern through discharge    Patient is not progressing towards the following goals:

## 2023-02-12 NOTE — CARE PLAN
The patient is Watcher - Medium risk of patient condition declining or worsening    Shift Goals  Clinical Goals: monitor drain, pain control, IVF  Patient Goals: pain control, go home  Family Goals: GERALD    Progress made toward(s) clinical / shift goals:    Problem: Pain - Standard  Goal: Alleviation of pain or a reduction in pain to the patient’s comfort goal  Outcome: Progressing  Note: Pain managed w/ PRN oxycodone     Problem: Mobility  Goal: Patient's capacity to carry out activities will improve  Outcome: Progressing  Note: Pt ambulating independently in room w/ steady gait     EOS Progress Note:    Assumed care of pt from 1845-0715    - A/O x4  - VSS  - C/o pain at  site; controlled w/ PRN oxycodone  - PIV flushes w/o difficulty, maintenance IVF infusing per MAR  - Ambulating independently in the room w/ steady gait  - Voiding w/o difficulty  - Uses call bell appropriately    Pt rested comfortably overnight. All questions/concerns addressed at this time. Bed is in lowest position and locked. Call bell is within reach. POC followed.

## 2023-02-13 LAB
GLUCOSE BLD STRIP.AUTO-MCNC: 212 MG/DL (ref 65–99)
GLUCOSE BLD STRIP.AUTO-MCNC: 242 MG/DL (ref 65–99)
GLUCOSE BLD STRIP.AUTO-MCNC: 250 MG/DL (ref 65–99)
GLUCOSE BLD STRIP.AUTO-MCNC: 259 MG/DL (ref 65–99)
GLUCOSE BLD STRIP.AUTO-MCNC: 264 MG/DL (ref 65–99)

## 2023-02-13 PROCEDURE — 700102 HCHG RX REV CODE 250 W/ 637 OVERRIDE(OP)

## 2023-02-13 PROCEDURE — 700111 HCHG RX REV CODE 636 W/ 250 OVERRIDE (IP)

## 2023-02-13 PROCEDURE — 82962 GLUCOSE BLOOD TEST: CPT

## 2023-02-13 PROCEDURE — 700111 HCHG RX REV CODE 636 W/ 250 OVERRIDE (IP): Performed by: STUDENT IN AN ORGANIZED HEALTH CARE EDUCATION/TRAINING PROGRAM

## 2023-02-13 PROCEDURE — 770006 HCHG ROOM/CARE - MED/SURG/GYN SEMI*

## 2023-02-13 PROCEDURE — 700105 HCHG RX REV CODE 258

## 2023-02-13 PROCEDURE — A9270 NON-COVERED ITEM OR SERVICE: HCPCS

## 2023-02-13 PROCEDURE — A9270 NON-COVERED ITEM OR SERVICE: HCPCS | Performed by: STUDENT IN AN ORGANIZED HEALTH CARE EDUCATION/TRAINING PROGRAM

## 2023-02-13 PROCEDURE — 700105 HCHG RX REV CODE 258: Performed by: STUDENT IN AN ORGANIZED HEALTH CARE EDUCATION/TRAINING PROGRAM

## 2023-02-13 PROCEDURE — 99232 SBSQ HOSP IP/OBS MODERATE 35: CPT | Mod: GC | Performed by: INTERNAL MEDICINE

## 2023-02-13 PROCEDURE — 700102 HCHG RX REV CODE 250 W/ 637 OVERRIDE(OP): Performed by: STUDENT IN AN ORGANIZED HEALTH CARE EDUCATION/TRAINING PROGRAM

## 2023-02-13 RX ADMIN — NYSTATIN: 100000 POWDER TOPICAL at 06:00

## 2023-02-13 RX ADMIN — METOPROLOL SUCCINATE 50 MG: 50 TABLET, EXTENDED RELEASE ORAL at 05:47

## 2023-02-13 RX ADMIN — OXYCODONE HYDROCHLORIDE 7.5 MG: 5 TABLET ORAL at 06:14

## 2023-02-13 RX ADMIN — PIPERACILLIN AND TAZOBACTAM 3.38 G: 3; .375 INJECTION, POWDER, LYOPHILIZED, FOR SOLUTION INTRAVENOUS; PARENTERAL at 05:59

## 2023-02-13 RX ADMIN — NYSTATIN: 100000 POWDER TOPICAL at 17:52

## 2023-02-13 RX ADMIN — OXYCODONE HYDROCHLORIDE 7.5 MG: 5 TABLET ORAL at 13:56

## 2023-02-13 RX ADMIN — HYDROCHLOROTHIAZIDE 12.5 MG: 25 TABLET ORAL at 05:47

## 2023-02-13 RX ADMIN — INSULIN LISPRO 2 UNITS: 100 INJECTION, SOLUTION INTRAVENOUS; SUBCUTANEOUS at 18:10

## 2023-02-13 RX ADMIN — PIPERACILLIN AND TAZOBACTAM 3.38 G: 3; .375 INJECTION, POWDER, LYOPHILIZED, FOR SOLUTION INTRAVENOUS; PARENTERAL at 14:11

## 2023-02-13 RX ADMIN — LISINOPRIL 20 MG: 20 TABLET ORAL at 05:48

## 2023-02-13 RX ADMIN — HEPARIN SODIUM 5000 UNITS: 5000 INJECTION INTRAVENOUS; SUBCUTANEOUS at 20:29

## 2023-02-13 RX ADMIN — CETIRIZINE HYDROCHLORIDE 10 MG: 10 TABLET, FILM COATED ORAL at 05:47

## 2023-02-13 RX ADMIN — OXYCODONE HYDROCHLORIDE 7.5 MG: 5 TABLET ORAL at 18:05

## 2023-02-13 RX ADMIN — OXYCODONE HYDROCHLORIDE 7.5 MG: 5 TABLET ORAL at 00:11

## 2023-02-13 RX ADMIN — HEPARIN SODIUM 5000 UNITS: 5000 INJECTION INTRAVENOUS; SUBCUTANEOUS at 05:47

## 2023-02-13 RX ADMIN — INSULIN LISPRO 3 UNITS: 100 INJECTION, SOLUTION INTRAVENOUS; SUBCUTANEOUS at 13:51

## 2023-02-13 RX ADMIN — PIPERACILLIN AND TAZOBACTAM 3.38 G: 3; .375 INJECTION, POWDER, LYOPHILIZED, FOR SOLUTION INTRAVENOUS; PARENTERAL at 22:05

## 2023-02-13 RX ADMIN — HEPARIN SODIUM 5000 UNITS: 5000 INJECTION INTRAVENOUS; SUBCUTANEOUS at 13:49

## 2023-02-13 RX ADMIN — INSULIN LISPRO 2 UNITS: 100 INJECTION, SOLUTION INTRAVENOUS; SUBCUTANEOUS at 09:06

## 2023-02-13 RX ADMIN — OXYCODONE HYDROCHLORIDE 7.5 MG: 5 TABLET ORAL at 22:02

## 2023-02-13 RX ADMIN — ATORVASTATIN CALCIUM 40 MG: 40 TABLET, FILM COATED ORAL at 17:49

## 2023-02-13 RX ADMIN — SENNOSIDES AND DOCUSATE SODIUM 2 TABLET: 50; 8.6 TABLET ORAL at 05:47

## 2023-02-13 RX ADMIN — INSULIN LISPRO 2 UNITS: 100 INJECTION, SOLUTION INTRAVENOUS; SUBCUTANEOUS at 22:08

## 2023-02-13 ASSESSMENT — PAIN DESCRIPTION - PAIN TYPE
TYPE: ACUTE PAIN

## 2023-02-13 NOTE — DIETARY
Nutrition Services Brief Update:    Day 8 of admit.  Srinivasan Cruz is a 67 y.o. male with admitting DX of Psoas abscess.    Current Diet: Consistent CHO (Diabetic), 6 small meals modification    Problem: Nutritional:  Goal: Achieve adequate nutritional intake  Description: Patient will consume 50% of meals  Outcome: Met  PO consistently >50% of meals

## 2023-02-13 NOTE — NON-PROVIDER
This note is for learning purposes only and not for billing. Note written by Joao Leonardo, Medical Student    Daily Progress Note:   Note Author: Joao Leonardo, Student  Date: 2/13/2023    Date of Admission: 2/5/2023  Attending: Dr. Jose Hernández  Medical Student: Joao Leonardo, MS3    Reason for interval visit  (Principal Problem)   Pelvic abscess in male (HCC))left groin pain, left hip pain, left leg weakness, loss of appetite, constipation    Chief Complaint:   Srinivasan Cruz is a 67 y.o. male with a PMH of prostate cancer, DM2, hyperlipidemia, and HTN who was admitted on 2/5/2023 with left groin pain, left hip pain, loss of appetite, and constipation with suspected diverticular abscess.    Subjective:  No acute overnight events. Patient continuing to improve. States his pain is currently a 4/10 and managed well with his medications. Mobility is also improving. ROS is negative. Patient states his last BM was last night. He has no other concerns.    Consultants/Specialty:  IR.  ID.    Review of Systems:    ROS  See subjective.    Objective Data:    Vitals:   Temp:  [36.2 °C (97.1 °F)-36.8 °C (98.2 °F)] 36.7 °C (98.1 °F)  Pulse:  [61-83] 63  Resp:  [18] 18  BP: (114-149)/(66-79) 119/70  SpO2:  [93 %-98 %] 95 %RA    Intake/Output Summary (Last 24 hours) at 2/13/2023 0757  Last data filed at 2/13/2023 0500  Gross per 24 hour   Intake 600 ml   Output 915 ml   Net -315 ml       Vitals have been reviewed and are accurate.    Physical Exam:  General: Ill appearing in no acute distress, resting on arrival to room  HEENT: Normocephalic, atraumatic, normal thyroid  Cardiovascular: RRR, no murmurs, gallops, or rubs  Pulmonary: CTAB, symmetrical chest expansion, no rales, rhonchi, or wheezes  Abdominal: Normoactive bowel sounds, mild distension, no rebound tenderness, no masses, no signs of trauma, erythema. No CVA tenderness, left EMETERIO drain.  Extremities: Moves all spontaneously, no peripheral edema,  radial and pedal pulses 2+ bilateral.   Neurological: Alert and oriented, CN III-XII intact, reduced left hip strength limited to pain not neuropathy, 5/5 left plantar flexion. Normal sensory exams.    Lab Results:  Recent Labs     02/11/23 0234   WBC 7.5   RBC 3.68*   HEMOGLOBIN 10.6*   HEMATOCRIT 32.4*   MCV 88.0   MCH 28.8   RDW 42.4   PLATELETCT 390   MPV 9.3     Recent Labs     02/11/23 0234 02/12/23 0139   SODIUM 133* 132*   POTASSIUM 4.1 4.1   CHLORIDE 98 97   CO2 26 28   BUN 14 14   CREATININE 0.88 0.92   CALCIUM 8.0* 8.5     Estimated GFR/CRCL = Estimated Creatinine Clearance: 81.8 mL/min (by C-G formula based on SCr of 0.92 mg/dL).  Recent Labs     02/11/23 0234 02/12/23 0139   GLUCOSE 303* 269*                 No results for input(s): INR, APTT, FIBRINOGEN in the last 72 hours.    Invalid input(s): DIMER    Labs have been reviewed and are accurate.    Microbiology Results:  Results       Procedure Component Value Units Date/Time    Anaerobic Culture [[id number]] Collected: 02/08/23 1540    Order Status: Completed Specimen: Wound Updated: 02/12/23 1104     Significant Indicator NEG     Source WND     Site LLQ abdominal abscess     Culture Result No Anaerobes isolated.    CULTURE WOUND W/ GRAM STAIN [[id number]]  (Abnormal) Collected: 02/08/23 1540    Order Status: Completed Specimen: Wound Updated: 02/12/23 1104     Significant Indicator POS     Source WND     Site LLQ abdominal abscess     Culture Result -     Gram Stain Result Many WBCs.  Moderate Gram positive cocci.       Culture Result Streptococcus agalactiae (Group B)  Heavy growth      MRSA By PCR (Amp) [[id number]] Collected: 02/09/23 1025    Order Status: Completed Specimen: Respirate from Nares Updated: 02/09/23 1349     MRSA by PCR Negative    Narrative:      Collected By: [id number] JUHI COX    GRAM STAIN [[id number]] Collected: 02/08/23 1540    Order Status: Completed Specimen: Wound Updated: 02/09/23 0332     Significant Indicator .      Source WND     Site LLQ abdominal abscess     Gram Stain Result Many WBCs.  Moderate Gram positive cocci.      FLUID CULTURE W/GRAM STAIN [[id number]]     Order Status: No result Specimen: Body Fluid from Peritoneal Fluid     Aerobic/Anaerobic Culture (Surgery) [[id number]]     Order Status: No result Specimen: Other from Abdominal             Imaging Results:  CT-DRAIN-PERITONEAL   Final Result      1.  CT GUIDED PLACEMENT OF A PERCUTANEOUS DRAINAGE CATHETER IN A LEFT PELVIC SIDEWALL FLUID COLLECTION.   2.  THE CURRENT PLAN IS TO MONITOR DRAINAGE OUTPUT AND OBTAIN A FOLLOWUP CT SCAN IN 5-7 DAYS IF CLINICALLY INDICATED.      CT-ABDOMEN-PELVIS WITH   Final Result      1.  There is a nonspecific hypodense mass versus complex fluid collection in the left medial hemipelvis abutting the left iliopsoas musculature just medial to the acetabulum which extends into the left inguinal region. The overall appearance on CT is    more suggestive of an infectious or inflammatory process rather than necrotic metastatic disease in this patient with known prostate cancer. This does not involve the left hip joint.   2.  There is otherwise no metastatic adenopathy in the abdomen or pelvis.   3.  Incidental simple left renal cortical cyst. No follow up imaging is recommended per consensus guidelines of the 2019 ACR Incidental Findings Committee for probably benign incidental simple appearing renal cystic lesion(s) based on imaging criteria.          Imaging has been reviewed and are accurate.    EKG  No results found for this or any previous visit.    Current Medications    Current Facility-Administered Medications:     insulin GLARGINE (Lantus,Semglee) injection, 14 Units, Subcutaneous, QAM INSULIN, Jennifer Mahan M.D.    nystatin (MYCOSTATIN) powder, , Topical, BID, Hero Magallanes D.O., Given at 02/13/23 0600    insulin lispro (AdmeLOG,HumaLOG) injection, 1-6 Units, Subcutaneous, 4X/DAY ACHS, Isidro Frances M.D., 4 Units at  02/12/23 2109    [COMPLETED] piperacillin-tazobactam (Zosyn) 3.375 g in  mL IVPB, 3.375 g, Intravenous, Once, Stopped at 02/09/23 1832 **AND** piperacillin-tazobactam (Zosyn) 3.375 g in  mL IVPB, 3.375 g, Intravenous, Q8HRS, Poncho Madison D.O., Last Rate: 25 mL/hr at 02/13/23 0559, 3.375 g at 02/13/23 0559    HYDROmorphone (Dilaudid) injection 0.5 mg, 0.5 mg, Intravenous, Q4HRS PRN, Hero Magallanes D.O.    cetirizine (ZYRTEC) tablet 10 mg, 10 mg, Oral, DAILY, Poncho Madison D.O., 10 mg at 02/13/23 0547    oxyCODONE immediate-release (ROXICODONE) tablet 5 mg, 5 mg, Oral, Q4HRS PRN, Hero Magallanes D.O., 5 mg at 02/12/23 0149    oxyCODONE immediate-release (ROXICODONE) tablet 7.5 mg, 7.5 mg, Oral, Q4HRS PRN, Hero Magallanes D.O., 7.5 mg at 02/13/23 0614    senna-docusate (PERICOLACE or SENOKOT S) 8.6-50 MG per tablet 2 Tablet, 2 Tablet, Oral, BID, 2 Tablet at 02/13/23 0547 **AND** polyethylene glycol/lytes (MIRALAX) PACKET 1 Packet, 1 Packet, Oral, QDAY PRN, 1 Packet at 02/06/23 1753 **AND** magnesium hydroxide (MILK OF MAGNESIA) suspension 30 mL, 30 mL, Oral, QDAY PRN **AND** bisacodyl (DULCOLAX) suppository 10 mg, 10 mg, Rectal, QDAY PRN, Hero Magallanes D.O.    heparin injection 5,000 Units, 5,000 Units, Subcutaneous, Q8HRS, Hero Magallanes D.O., 5,000 Units at 02/13/23 0547    acetaminophen (Tylenol) tablet 650 mg, 650 mg, Oral, Q6HRS PRN, Hero Magallanes D.O., 650 mg at 02/11/23 1827    [DISCONTINUED] insulin regular (HumuLIN R,NovoLIN R) injection, 1-6 Units, Subcutaneous, Q6HRS, 2 Units at 02/10/23 0639 **AND** POC blood glucose manual result, , , Q6H **AND** NOTIFY MD and PharmD, , , Once **AND** Administer 20 grams of glucose (approximately 8 ounces of fruit juice) every 15 minutes PRN FSBG less than 70 mg/dL, , , PRN **AND** dextrose 10 % BOLUS 25 g, 25 g, Intravenous, Q15 MIN PRN, Hero Magallanes D.O.    atorvastatin (LIPITOR) tablet 40 mg, 40 mg, Oral, Q EVENING, Hero Magallanes,  D.O., 40 mg at 02/12/23 1739    lisinopril (PRINIVIL) tablet 20 mg, 20 mg, Oral, Q DAY, NAV ShannonO., 20 mg at 02/13/23 0548    hydroCHLOROthiazide (HYDRODIURIL) tablet 12.5 mg, 12.5 mg, Oral, Q DAY, SOCRATES Shannon.O., 12.5 mg at 02/13/23 0547    metoprolol SR (TOPROL XL) tablet 50 mg, 50 mg, Oral, Q DAY, SOCRATES Shannon.O., 50 mg at 02/13/23 0547    lidocaine (LIDODERM) 5 % 1 Patch, 1 Patch, Transdermal, Q24HR, Hero Magallanes D.O., 1 Patch at 02/08/23 1708    simethicone (Mylicon) chewable tablet 125 mg, 125 mg, Oral, TID PRN, Arnaldo Terrazas M.D., 125 mg at 02/10/23 1643    Problem Representation:   Srinivasan Cruz is a 67 y.o. male with a PMH of prostate cancer, DM2, hyperlipidemia, and HTN who was admitted on 2/5/2023 with left groin pain, left hip pain, loss of appetite, and constipation with suspected ilopsoas abscess. He has had two weeks of worsening fatigue, fever, chills, with associated left groin and hip pain and weakness. On physical exam, there are no signs of trauma but he does have LLQ abdominal tenderness with mild guarding and distension and he is weak in the left hip. Initial abdominal CT showed nonspecific hypondense mass vs. Complex fluid collection in the left pelvic/psoas region with no metastatic adenopathy. Urinalysis was normal.     #Pelvic Abscess:              -Unknown etiology. Colonoscopy in 2018 showed diverticulosis but patient has been asymptomatic. Also had robotic prostatectomy in 2021 with no signs of metastasis. Likely diverticulosis.              -Interventional Radiology consult for drainage.              -ID consult, appreciate recs.              -Tylenol and oxycodone prn for pain.              -IVF LR.              -NPO.              -2/8, IR drained 40mL, had another 30mL overnight of purulent fluid.              -Monitor drain output.              -Start Zosyn.              -Pending cultures and perform CT in 5-7 days.              -2/9, culture  positive streptococcus agalactiae, pending anaerobic culture, MRSA negative.              -Possible surgery consult if worsening.              -CTM for worsening symptoms/sepsis.              -Patient will need colonoscopy outpatient.     #DM2:              -2/6, A1C: 10.3  -Diabetes medication history unclear due to the patient having tried multiple meds and stopping them abruptly due to financial reasons. Only meds taken at home are metformin and insulin for the past six months. Patient said he is compliant.  -Glargine and Lispro.  -Keep glucose 140-180, no sliding scale at glucose below 200.              -CTM.     #Anemia of Chronic Disease:              -2/5, H&H: 11.7/35.6              -2/6, H&H: 10.9/33.0              -2/6, elevated ferritin, low iron, low TIBC, low % saturation.              -CTM     #Hyperlipidemia:              -Continue home Atorvastatin.     #Hypokalemia:              -2/5, potassium 3.1              -Give supplementation.              -CTM.     VTE Prophylaxis:  Heparin.     Disposition:  Inpatient.

## 2023-02-13 NOTE — CARE PLAN
The patient is Stable - Low risk of patient condition declining or worsening    Shift Goals  Clinical Goals: monitor drain, pain control  Patient Goals: pain control, go home  Family Goals: GERALD    Progress made toward(s) clinical / shift goals:    Problem: Pain - Standard  Goal: Alleviation of pain or a reduction in pain to the patient’s comfort goal  Outcome: Progressing  Note: C/o LLQ EMETERIO site/L hip pain. Medications administered per MAR which were effective.     Problem: Mobility  Goal: Patient's capacity to carry out activities will improve  Outcome: Progressing  Note: Pt is self care and able to complete ADLs on his own.     EOS Progress Note:    Assumed care of pt from 1845-0715.    - A/Ox4  - VSS on RA  - C/o LLQ EMETERIO site/L hip pain - medicated per MAR  - PIV flushes w/o difficulty; saline locked  - Ambulating independently in the room  - Voiding w/o difficulty  - EMETERIO draining thick, tan liquid; dressing is C/D/I  - Uses call bell appropriately  - No bed alarm necessary d/t no fall risk    Pt rested comfortably overnight. All questions/concerns addressed this shift. Bed is in lowest position and locked. Call bell is within reach. POC followed.

## 2023-02-13 NOTE — PROGRESS NOTES
[facility] Internal Medicine Daily Progress Note    Date of Service  2/13/2023    R Team: R IM White Team   Attending: Jose Hernández M.d.  Senior Resident: Dr. Madison  Intern:  Dr. Mahan  Contact Number: [phone]    Chief Complaint  Srinivasan Cruz is a 67 y.o. male admitted 2/5/2023 with groin pain, loss of appetite, hip pain and constipation.    Hospital Course  Srinivasan Cruz is a 67 y.o. male with PMH of type 2 diabetes, HLD, and HTN who presented 2/5/2023 with chief complaint of 2 weeks of hip pain. He initially presented to an urgent care in [place] and was prescribed a muscle relaxant for suspected sciatica. When his pain continued he decided to present to the ED. CT abdomen/pelvis noted nonspecific hypodense mass vs complex fluid collection in the L medial hemipelvis abutting L iliopsoas. IR was consulted for drain placement. Cultures resulted positive for GBS. Blood glucose up to 300s.     Interval Problem Update  Drain output 15 cc last 24 hours, stable from day prior. Last BM last night. Feeling much better this am in terms of pain. Per micro lab, sensitivities for GBS only done if infection from bloodstream.      I have discussed this patient's plan of care and discharge plan at IDT rounds today with Case Management, Nursing, Nursing leadership, and other members of the IDT team.    Consultants/Specialty  None    Code Status  Full Code    Disposition  Patient is not medically cleared for discharge.   Anticipate discharge to to home with close outpatient follow-up.  I have placed the appropriate orders for post-discharge needs.    Review of Systems  Constitutional:  Negative for chills, diaphoresis, fever and weight loss.   HENT:  Negative for congestion, sinus pain and sore throat.    Respiratory:  Negative for cough, hemoptysis, sputum production, shortness of breath and wheezing.    Cardiovascular:  Negative for chest pain, palpitations, orthopnea, claudication, leg swelling and  PND.   Gastrointestinal:  Positive for abdominal pain (improved). Negative for blood in stool, constipation, diarrhea, heartburn, melena, nausea and vomiting.   Genitourinary:  Negative for dysuria, flank pain, frequency, hematuria and urgency.   Musculoskeletal:  Positive for back pain, joint pain. Negative for myalgias,  falls and neck pain.        L hip pain and R shoulder pain   Skin:  Negative for itching and rash.   Neurological:  Negative for dizziness, seizures, loss of consciousness and headaches.     Physical Exam  Temp:  [36.2 °C (97.1 °F)-36.8 °C (98.2 °F)] 36.7 °C (98.1 °F)  Pulse:  [61-83] 63  Resp:  [18] 18  BP: (114-141)/(66-76) 119/70  SpO2:  [93 %-98 %] 95 %    Physical Exam  Vitals and nursing note reviewed.   Constitutional:       General: He is not in acute distress.     Appearance: Normal appearance. He is normal weight. He is not toxic-appearing or diaphoretic.   Cardiovascular:      Rate and Rhythm: Normal rate and regular rhythm.      Heart sounds: Normal heart sounds. No murmur heard.   Pulmonary:      Effort: Pulmonary effort is normal. No respiratory distress.      Breath sounds: Normal breath sounds. No stridor. No wheezing, rhonchi or rales.   Abdominal:      General: Abdomen is flat. Bowel sounds are normal. There is no distension.      Palpations: Abdomen is soft. There is no mass.      Tenderness: There is abdominal tenderness.      Comments: LLQ discomfort with palpation (decreased from previous). Drain present, draining cloudly fluid.   Musculoskeletal:         General: No tenderness, deformity or signs of injury.      Right lower leg: No edema.      Left lower leg: No edema.      Comment: Continued pain with L leg movement.     Fluids    Intake/Output Summary (Last 24 hours) at 2/13/2023 1534  Last data filed at 2/13/2023 0900  Gross per 24 hour   Intake 360 ml   Output 915 ml   Net -555 ml       Laboratory  Recent Labs     02/11/23  0234   WBC 7.5   RBC 3.68*   HEMOGLOBIN 10.6*    HEMATOCRIT 32.4*   MCV 88.0   MCH 28.8   MCHC 32.7*   RDW 42.4   PLATELETCT 390   MPV 9.3     Recent Labs     02/11/23  0234 02/12/23  0139   SODIUM 133* 132*   POTASSIUM 4.1 4.1   CHLORIDE 98 97   CO2 26 28   GLUCOSE 303* 269*   BUN 14 14   CREATININE 0.88 0.92   CALCIUM 8.0* 8.5                   Imaging  CT-ABDOMEN-PELVIS WITH    Result Date: 2/5/2023  1.  There is a nonspecific hypodense mass versus complex fluid collection in the left medial hemipelvis abutting the left iliopsoas musculature just medial to the acetabulum which extends into the left inguinal region. The overall appearance on CT is more suggestive of an infectious or inflammatory process rather than necrotic metastatic disease in this patient with known prostate cancer. This does not involve the left hip joint. 2.  There is otherwise no metastatic adenopathy in the abdomen or pelvis. 3.  Incidental simple left renal cortical cyst. No follow up imaging is recommended per consensus guidelines of the 2019 ACR Incidental Findings Committee for probably benign incidental simple appearing renal cystic lesion(s) based on imaging criteria.    CT-DRAIN-PERITONEAL    Result Date: 2/8/2023  1.  CT GUIDED PLACEMENT OF A PERCUTANEOUS DRAINAGE CATHETER IN A LEFT PELVIC SIDEWALL FLUID COLLECTION. 2.  THE CURRENT PLAN IS TO MONITOR DRAINAGE OUTPUT AND OBTAIN A FOLLOWUP CT SCAN IN 5-7 DAYS IF CLINICALLY INDICATED.       Assessment/Plan  Problem Representation:  *Pelvic abscess (HCC)- (present on admission)  Assessment & Plan  Ddx secondary to diverticulitis, UTI, or hematogenous or lymphatic seeding from a distant site (risk factor of diabetes)   -CT A/P 2/5 noted nonspecific hypodense mass vs complex fluid collection in L medial hemipelvis abutting L iliopsoas.   -Colonoscopy 2018 noted few colonic diverticuli. Possible source.   -2/7 ordered lactic acid to assess for bowel ischemia, lactic acid WNL.   Plan:   -Zosyn (started 2/9) to be continued per ID  -PRN  5-7.5 Oxycodone   -IR drain placed on 2/8, draining purulent fluid.  Growing strep agalactiae  -Will repeat CTAP 2/14 to evaluate response to treatment     Exanthematous rash   Assessment & Plan  -Rash most consistent with topical irritation, exanthematous drug eruption, vs folliculitis. Dced ceftriaxone and metronidazole and rash improved. May be folliculitis in the setting of significant sweating as well.       Normocytic anemia  Assessment & Plan  -On admission Hgb 11.7. Only other comparison lab available is from 4/20/2021 and was also low at 12.1 .   -anemia of chronic disease. Labs notable for low iron, low tibc, elevated ferritin.      Groin pain, left  Assessment & Plan  -Likely related to abscess. However, possible hernia; pt unable to stand so cannot do accurate hernia exam at this time. Will monitor for now.      Chronic right shoulder pain  Assessment & Plan  -PRN diclofenac gel  -Scheduled lidocaine patches      Elevated lipase  Assessment & Plan  -Pt does not meet pancreatitis criteria, will monitor for now.      Hypokalemia - resolved   Assessment & Plan  Continue to monitor, replete as necessary.       Benign essential HTN- (present on admission)  Assessment & Plan  -Prinzide not available, ordered seperately as Lisinopril 20 and HCTZ 12.5   -Continue home metoprolol XL 50      Controlled type 2 diabetes mellitus without complication, without long-term current use of insulin (HCC)- (present on admission)  Assessment & Plan  -home meds: metformin 1000 and Pioglitazone 30.   Plan:   -Holding home meds   -ISS   -Glargine 14 units QAM   -Hypoglycemia protocol      Mixed hyperlipidemia- (present on admission)  Assessment & Plan  -continue home atorvastatin      History of prostate cancer  Assessment & Plan  -Adenocarcinoma of prostate. S/P RA RP and bilat PLND on 4/19/2021.        VTE prophylaxis: heparin ppx     I have performed a physical exam and reviewed and updated ROS and Plan today (2/13/2023). In  review of yesterday's note (2/12/2023), there are no changes except as documented above.

## 2023-02-14 ENCOUNTER — APPOINTMENT (OUTPATIENT)
Dept: RADIOLOGY | Facility: MEDICAL CENTER | Age: 68
DRG: 373 | End: 2023-02-14
Payer: MEDICARE

## 2023-02-14 PROBLEM — Z86.79 PERSONAL HISTORY OF ATRIAL FIBRILLATION: Status: ACTIVE | Noted: 2023-02-14

## 2023-02-14 LAB
GLUCOSE BLD STRIP.AUTO-MCNC: 189 MG/DL (ref 65–99)
GLUCOSE BLD STRIP.AUTO-MCNC: 300 MG/DL (ref 65–99)
GLUCOSE BLD STRIP.AUTO-MCNC: 302 MG/DL (ref 65–99)
GLUCOSE BLD STRIP.AUTO-MCNC: 308 MG/DL (ref 65–99)

## 2023-02-14 PROCEDURE — 74177 CT ABD & PELVIS W/CONTRAST: CPT

## 2023-02-14 PROCEDURE — 700105 HCHG RX REV CODE 258

## 2023-02-14 PROCEDURE — 770006 HCHG ROOM/CARE - MED/SURG/GYN SEMI*

## 2023-02-14 PROCEDURE — A9270 NON-COVERED ITEM OR SERVICE: HCPCS

## 2023-02-14 PROCEDURE — 700102 HCHG RX REV CODE 250 W/ 637 OVERRIDE(OP)

## 2023-02-14 PROCEDURE — 700102 HCHG RX REV CODE 250 W/ 637 OVERRIDE(OP): Performed by: STUDENT IN AN ORGANIZED HEALTH CARE EDUCATION/TRAINING PROGRAM

## 2023-02-14 PROCEDURE — 700111 HCHG RX REV CODE 636 W/ 250 OVERRIDE (IP)

## 2023-02-14 PROCEDURE — 700117 HCHG RX CONTRAST REV CODE 255

## 2023-02-14 PROCEDURE — 99231 SBSQ HOSP IP/OBS SF/LOW 25: CPT | Mod: GC | Performed by: HOSPITALIST

## 2023-02-14 PROCEDURE — 82962 GLUCOSE BLOOD TEST: CPT

## 2023-02-14 PROCEDURE — A9270 NON-COVERED ITEM OR SERVICE: HCPCS | Performed by: STUDENT IN AN ORGANIZED HEALTH CARE EDUCATION/TRAINING PROGRAM

## 2023-02-14 RX ADMIN — OXYCODONE HYDROCHLORIDE 7.5 MG: 5 TABLET ORAL at 13:42

## 2023-02-14 RX ADMIN — HYDROCHLOROTHIAZIDE 12.5 MG: 25 TABLET ORAL at 06:31

## 2023-02-14 RX ADMIN — PIPERACILLIN AND TAZOBACTAM 3.38 G: 3; .375 INJECTION, POWDER, LYOPHILIZED, FOR SOLUTION INTRAVENOUS; PARENTERAL at 06:40

## 2023-02-14 RX ADMIN — METOPROLOL SUCCINATE 50 MG: 50 TABLET, EXTENDED RELEASE ORAL at 06:31

## 2023-02-14 RX ADMIN — LISINOPRIL 20 MG: 20 TABLET ORAL at 06:32

## 2023-02-14 RX ADMIN — INSULIN LISPRO 4 UNITS: 100 INJECTION, SOLUTION INTRAVENOUS; SUBCUTANEOUS at 21:22

## 2023-02-14 RX ADMIN — HEPARIN SODIUM 5000 UNITS: 5000 INJECTION INTRAVENOUS; SUBCUTANEOUS at 21:23

## 2023-02-14 RX ADMIN — OXYCODONE HYDROCHLORIDE 7.5 MG: 5 TABLET ORAL at 20:32

## 2023-02-14 RX ADMIN — PIPERACILLIN AND TAZOBACTAM 3.38 G: 3; .375 INJECTION, POWDER, LYOPHILIZED, FOR SOLUTION INTRAVENOUS; PARENTERAL at 21:31

## 2023-02-14 RX ADMIN — HEPARIN SODIUM 5000 UNITS: 5000 INJECTION INTRAVENOUS; SUBCUTANEOUS at 13:39

## 2023-02-14 RX ADMIN — HEPARIN SODIUM 5000 UNITS: 5000 INJECTION INTRAVENOUS; SUBCUTANEOUS at 06:32

## 2023-02-14 RX ADMIN — IOHEXOL 100 ML: 350 INJECTION, SOLUTION INTRAVENOUS at 23:00

## 2023-02-14 RX ADMIN — SENNOSIDES AND DOCUSATE SODIUM 2 TABLET: 50; 8.6 TABLET ORAL at 06:32

## 2023-02-14 RX ADMIN — OXYCODONE HYDROCHLORIDE 7.5 MG: 5 TABLET ORAL at 02:19

## 2023-02-14 RX ADMIN — ATORVASTATIN CALCIUM 40 MG: 40 TABLET, FILM COATED ORAL at 18:16

## 2023-02-14 RX ADMIN — NYSTATIN: 100000 POWDER TOPICAL at 18:16

## 2023-02-14 RX ADMIN — INSULIN LISPRO 1 UNITS: 100 INJECTION, SOLUTION INTRAVENOUS; SUBCUTANEOUS at 09:17

## 2023-02-14 RX ADMIN — INSULIN LISPRO 4 UNITS: 100 INJECTION, SOLUTION INTRAVENOUS; SUBCUTANEOUS at 14:02

## 2023-02-14 RX ADMIN — CETIRIZINE HYDROCHLORIDE 10 MG: 10 TABLET, FILM COATED ORAL at 06:32

## 2023-02-14 RX ADMIN — PIPERACILLIN AND TAZOBACTAM 3.38 G: 3; .375 INJECTION, POWDER, LYOPHILIZED, FOR SOLUTION INTRAVENOUS; PARENTERAL at 13:53

## 2023-02-14 RX ADMIN — INSULIN LISPRO 3 UNITS: 100 INJECTION, SOLUTION INTRAVENOUS; SUBCUTANEOUS at 18:30

## 2023-02-14 RX ADMIN — OXYCODONE HYDROCHLORIDE 7.5 MG: 5 TABLET ORAL at 06:31

## 2023-02-14 ASSESSMENT — PAIN DESCRIPTION - PAIN TYPE
TYPE: ACUTE PAIN
TYPE: ACUTE PAIN

## 2023-02-14 ASSESSMENT — PATIENT HEALTH QUESTIONNAIRE - PHQ9
SUM OF ALL RESPONSES TO PHQ9 QUESTIONS 1 AND 2: 0
1. LITTLE INTEREST OR PLEASURE IN DOING THINGS: NOT AT ALL
1. LITTLE INTEREST OR PLEASURE IN DOING THINGS: NOT AT ALL
2. FEELING DOWN, DEPRESSED, IRRITABLE, OR HOPELESS: NOT AT ALL
2. FEELING DOWN, DEPRESSED, IRRITABLE, OR HOPELESS: NOT AT ALL
SUM OF ALL RESPONSES TO PHQ9 QUESTIONS 1 AND 2: 0

## 2023-02-14 ASSESSMENT — PAIN SCALES - WONG BAKER: WONGBAKER_NUMERICALRESPONSE: DOESN'T HURT AT ALL

## 2023-02-14 NOTE — NON-PROVIDER
This note is for learning purposes only and not for billing. Note written by Joao Leonardo, Medical Student    Daily Progress Note:   Note Author: Joao Leonardo, Student  Date: 2/14/2023    Date of Admission: 2/5/2023  Attending: Dr. Jose Hernández  Medical Student: Joao Leonardo, MS3    Reason for interval visit  (Principal Problem)   Pelvic abscess in male (HCC)left groin pain, left hip pain, left leg weakness, loss of appetite, constipation    Chief Complaint:   Srinivasan Cruz is a 67 y.o. male with a PMH of prostate cancer, DM2, hyperlipidemia, and HTN who was admitted on 2/5/2023 with left groin pain, left hip pain, loss of appetite, and constipation with suspected diverticular abscess.    Subjective:  No acute overnight events. Patient continuing to improve. Pain is really well managed with medications and patient feels he is returning to his baseline. ROS is negative. He has no other concerns.    Consultants/Specialty:  IR.  ID.    Review of Systems:    ROS  See subjective.    Objective Data:    Vitals:   Temp:  [36.3 °C (97.4 °F)-36.9 °C (98.4 °F)] 36.3 °C (97.4 °F)  Pulse:  [63-81] 81  Resp:  [17-18] 17  BP: (116-129)/(70-83) 118/76  SpO2:  [93 %-95 %] 95 %RA    Intake/Output Summary (Last 24 hours) at 2/14/2023 0722  Last data filed at 2/13/2023 1830  Gross per 24 hour   Intake 360 ml   Output 0 ml   Net 360 ml       Vitals have been reviewed and are accurate.    Physical Exam:  General: Well appearing in no acute distress, resting on arrival to room  HEENT: Normocephalic, atraumatic, normal thyroid  Cardiovascular: RRR, no murmurs, gallops, or rubs  Pulmonary: CTAB, symmetrical chest expansion, no rales, rhonchi, or wheezes  Abdominal: Normoactive bowel sounds, mild distension, no tenderness, no rebound, no masses, no signs of trauma, erythema. No CVA tenderness, left EMETERIO drain.  Extremities: Moves all spontaneously, no peripheral edema, radial and pedal pulses 2+ bilateral.    Neurological: Alert and oriented, CN III-XII intact, reduced left hip strength limited to pain not neuropathy, 5/5 left plantar flexion. Normal sensory exams.    Lab Results:  No results for input(s): WBC, RBC, HEMOGLOBIN, HEMATOCRIT, MCV, MCH, RDW, PLATELETCT, MPV, NEUTSPOLYS, LYMPHOCYTES, MONOCYTES, EOSINOPHILS, BASOPHILS, RBCMORPHOLO in the last 72 hours.  Recent Labs     02/12/23  0139   SODIUM 132*   POTASSIUM 4.1   CHLORIDE 97   CO2 28   BUN 14   CREATININE 0.92   CALCIUM 8.5     Estimated GFR/CRCL = Estimated Creatinine Clearance: 81.8 mL/min (by C-G formula based on SCr of 0.92 mg/dL).  Recent Labs     02/12/23  0139   GLUCOSE 269*                 No results for input(s): INR, APTT, FIBRINOGEN in the last 72 hours.    Invalid input(s): DIMER    Labs have been reviewed and are accurate.    Microbiology Results:  Results       Procedure Component Value Units Date/Time    Anaerobic Culture [[id number]] Collected: 02/08/23 1540    Order Status: Completed Specimen: Wound Updated: 02/12/23 1104     Significant Indicator NEG     Source WND     Site LLQ abdominal abscess     Culture Result No Anaerobes isolated.    CULTURE WOUND W/ GRAM STAIN [[id number]]  (Abnormal) Collected: 02/08/23 1540    Order Status: Completed Specimen: Wound Updated: 02/12/23 1104     Significant Indicator POS     Source WND     Site LLQ abdominal abscess     Culture Result -     Gram Stain Result Many WBCs.  Moderate Gram positive cocci.       Culture Result Streptococcus agalactiae (Group B)  Heavy growth      MRSA By PCR (Amp) [[id number]] Collected: 02/09/23 1025    Order Status: Completed Specimen: Respirate from Nares Updated: 02/09/23 1349     MRSA by PCR Negative    Narrative:      Collected By: [id number] UJHI COX    GRAM STAIN [[id number]] Collected: 02/08/23 1540    Order Status: Completed Specimen: Wound Updated: 02/09/23 0332     Significant Indicator .     Source WND     Site LLQ abdominal abscess     Gram Stain Result Many  WBCs.  Moderate Gram positive cocci.      FLUID CULTURE W/GRAM STAIN [[id number]]     Order Status: No result Specimen: Body Fluid from Peritoneal Fluid     Aerobic/Anaerobic Culture (Surgery) [[id number]]     Order Status: No result Specimen: Other from Abdominal             Imaging Results:  CT-DRAIN-PERITONEAL   Final Result      1.  CT GUIDED PLACEMENT OF A PERCUTANEOUS DRAINAGE CATHETER IN A LEFT PELVIC SIDEWALL FLUID COLLECTION.   2.  THE CURRENT PLAN IS TO MONITOR DRAINAGE OUTPUT AND OBTAIN A FOLLOWUP CT SCAN IN 5-7 DAYS IF CLINICALLY INDICATED.      CT-ABDOMEN-PELVIS WITH   Final Result      1.  There is a nonspecific hypodense mass versus complex fluid collection in the left medial hemipelvis abutting the left iliopsoas musculature just medial to the acetabulum which extends into the left inguinal region. The overall appearance on CT is    more suggestive of an infectious or inflammatory process rather than necrotic metastatic disease in this patient with known prostate cancer. This does not involve the left hip joint.   2.  There is otherwise no metastatic adenopathy in the abdomen or pelvis.   3.  Incidental simple left renal cortical cyst. No follow up imaging is recommended per consensus guidelines of the 2019 ACR Incidental Findings Committee for probably benign incidental simple appearing renal cystic lesion(s) based on imaging criteria.          Imaging has been reviewed and are accurate.    EKG  No results found for this or any previous visit.    Current Medications    Current Facility-Administered Medications:     insulin GLARGINE (Lantus,Semglee) injection, 14 Units, Subcutaneous, QAM INSULIN, Jennifer Mahan M.D., 14 Units at 02/13/23 0907    [COMPLETED] piperacillin-tazobactam (Zosyn) 3.375 g in  mL IVPB, 3.375 g, Intravenous, Once, Stopped at 02/09/23 1832 **AND** piperacillin-tazobactam (Zosyn) 3.375 g in  mL IVPB, 3.375 g, Intravenous, Q8HRS, Jennifer Mahan M.D., Last Rate: 25 mL/hr  at 02/14/23 0640, 3.375 g at 02/14/23 0640    nystatin (MYCOSTATIN) powder, , Topical, BID, Hero Magallanes D.O., Given at 02/13/23 1752    insulin lispro (AdmeLOG,HumaLOG) injection, 1-6 Units, Subcutaneous, 4X/DAY ACHS, Isidro Frances M.D., 2 Units at 02/13/23 2208    HYDROmorphone (Dilaudid) injection 0.5 mg, 0.5 mg, Intravenous, Q4HRS PRN, Hero Magallanes D.O.    cetirizine (ZYRTEC) tablet 10 mg, 10 mg, Oral, DAILY, Poncho Madison D.O., 10 mg at 02/14/23 0632    oxyCODONE immediate-release (ROXICODONE) tablet 5 mg, 5 mg, Oral, Q4HRS PRN, Hero Magallanes D.O., 5 mg at 02/12/23 0149    oxyCODONE immediate-release (ROXICODONE) tablet 7.5 mg, 7.5 mg, Oral, Q4HRS PRN, NAV ShannonOSolo, 7.5 mg at 02/14/23 0631    senna-docusate (PERICOLACE or SENOKOT S) 8.6-50 MG per tablet 2 Tablet, 2 Tablet, Oral, BID, 2 Tablet at 02/14/23 0632 **AND** polyethylene glycol/lytes (MIRALAX) PACKET 1 Packet, 1 Packet, Oral, QDAY PRN, 1 Packet at 02/06/23 1753 **AND** magnesium hydroxide (MILK OF MAGNESIA) suspension 30 mL, 30 mL, Oral, QDAY PRN **AND** bisacodyl (DULCOLAX) suppository 10 mg, 10 mg, Rectal, QDAY PRN, Hero Magallanes D.O.    heparin injection 5,000 Units, 5,000 Units, Subcutaneous, Q8HRS, Hero Magallanes D.O., 5,000 Units at 02/14/23 0632    acetaminophen (Tylenol) tablet 650 mg, 650 mg, Oral, Q6HRS PRN, Hero Magallanes D.O., 650 mg at 02/11/23 1827    [DISCONTINUED] insulin regular (HumuLIN R,NovoLIN R) injection, 1-6 Units, Subcutaneous, Q6HRS, 2 Units at 02/10/23 0639 **AND** POC blood glucose manual result, , , Q6H **AND** NOTIFY MD and PharmD, , , Once **AND** Administer 20 grams of glucose (approximately 8 ounces of fruit juice) every 15 minutes PRN FSBG less than 70 mg/dL, , , PRN **AND** dextrose 10 % BOLUS 25 g, 25 g, Intravenous, Q15 MIN PRN, Hero Magallanes D.O.    atorvastatin (LIPITOR) tablet 40 mg, 40 mg, Oral, Q EVENING, Hero Magallanes D.O., 40 mg at 02/13/23 8829    lisinopril  (PRINIVIL) tablet 20 mg, 20 mg, Oral, Q DAY, SOCRATES Shannon.O., 20 mg at 02/14/23 0632    hydroCHLOROthiazide (HYDRODIURIL) tablet 12.5 mg, 12.5 mg, Oral, Q DAY, SOCRATES Shannon.O., 12.5 mg at 02/14/23 0631    metoprolol SR (TOPROL XL) tablet 50 mg, 50 mg, Oral, Q DAY, SOCRATES Shannon.O., 50 mg at 02/14/23 0631    lidocaine (LIDODERM) 5 % 1 Patch, 1 Patch, Transdermal, Q24HR, SOCRATES Shannon.RYLEY., 1 Patch at 02/08/23 1708    simethicone (Mylicon) chewable tablet 125 mg, 125 mg, Oral, TID PRN, Arnaldo Terrazas M.D., 125 mg at 02/10/23 1643    Problem Representation:   Srinivasan Cruz is a 67 y.o. male with a PMH of prostate cancer, DM2, hyperlipidemia, and HTN who was admitted on 2/5/2023 with left groin pain, left hip pain, loss of appetite, and constipation with suspected ilopsoas abscess. He has had two weeks of worsening fatigue, fever, chills, with associated left groin and hip pain and weakness. On physical exam, there are no signs of trauma but he does have LLQ abdominal tenderness with mild guarding and distension and he is weak in the left hip. Initial abdominal CT showed nonspecific hypondense mass vs. Complex fluid collection in the left pelvic/psoas region with no metastatic adenopathy. Urinalysis was normal.     #Pelvic Abscess:              -Unknown etiology. Colonoscopy in 2018 showed diverticulosis but patient has been asymptomatic. Also had robotic prostatectomy in 2021 with no signs of metastasis. Likely diverticulosis.              -Interventional Radiology consult for drainage.              -ID consult, appreciate recs.              -Tylenol and oxycodone prn for pain.              -IVF LR.              -NPO.              -2/8, IR drained 40mL, had another 30mL overnight of purulent fluid.              -Monitor drain output.              -Start Zosyn.              -Pending cultures and perform CT in 5-7 days.              -2/9, culture positive streptococcus agalactiae, pending  anaerobic culture, MRSA negative.              -Possible surgery consult if worsening.              -CTM for worsening symptoms/sepsis.              -Patient will need colonoscopy outpatient.     #DM2:              -2/6, A1C: 10.3  -Diabetes medication history unclear due to the patient having tried multiple meds and stopping them abruptly due to financial reasons. Only meds taken at home are metformin and insulin for the past six months. Patient said he is compliant.  -Glargine and Lispro.  -Keep glucose 140-180, no sliding scale at glucose below 200.              -CTM.     #Anemia of Chronic Disease:              -2/5, H&H: 11.7/35.6              -2/6, H&H: 10.9/33.0              -2/6, elevated ferritin, low iron, low TIBC, low % saturation.              -CTM     #Hyperlipidemia:              -Continue home Atorvastatin.     #Hypokalemia:              -2/5, potassium 3.1              -Give supplementation.              -CTM.     VTE Prophylaxis:  Heparin.     Disposition:  Inpatient.

## 2023-02-14 NOTE — CARE PLAN
The patient is Stable - Low risk of patient condition declining or worsening    Shift Goals  Clinical Goals: EMETERIO removal  Patient Goals: Go home  Family Goals: Go home    Progress made toward(s) clinical / shift goals:       Problem: Bowel Elimination  Goal: Establish and maintain regular bowel function  Outcome: Progressing - Pt reported medium-large BM around 1500. Intake is adequate and ambulatory in hallway.     Problem: Self Care  Goal: Patient will have the ability to perform ADLs independently or with assistance (bathe, groom, dress, toilet and feed)  Outcome: Progressing - Pt performing ADLs independently. Pt is up to bathroom, ambulatory in hallway, and independent with self-hygiene.         Pt awaiting CT at 1630.

## 2023-02-14 NOTE — CARE PLAN
The patient is Stable - Low risk of patient condition declining or worsening    Shift Goals  Clinical Goals: glucose control, pain management, monitor EMETERIO  Patient Goals: pain management, go home  Family Goals: GERALD    Progress made toward(s) clinical / shift goals:    Problem: Pain - Standard  Goal: Alleviation of pain or a reduction in pain to the patient’s comfort goal  Outcome: Progressing  Note: Pain managed w/ PRN oxy     Problem: Bowel Elimination  Goal: Establish and maintain regular bowel function  Outcome: Progressing  Note: Pt had BM today     Problem: Self Care  Goal: Patient will have the ability to perform ADLs independently or with assistance (bathe, groom, dress, toilet and feed)  Outcome: Progressing  Note: Pt completing ADLs independently     EOS Progress Note:     Assumed care of pt from 1845-0715.     - A/Ox4  - VSS on RA  - C/o LLQ EMETERIO site/L hip pain - medicated per MAR  - PIV flushes w/o difficulty; saline locked  - Ambulating independently in the room  - Voiding w/o difficulty  - EEMTERIO draining cloudy, tan liquid; dressing is C/D/I; flushed per order  - Uses call bell appropriately  - No bed alarm necessary d/t no fall risk     Pt rested comfortably overnight. All questions/concerns addressed this shift. Bed is in lowest position and locked. Call bell is within reach. POC followed.

## 2023-02-14 NOTE — PROGRESS NOTES
Assumed care of patient at 0715. Received bedside report from night shift RN. Bed is locked and in the lowest position, call light and personal belongings are within reach. Treaded socks in place and bed alarm armed. Patient updated on plan of care, no complaints of pain at this time. White board updated. Patient AxOx4, breathing pattern is unlabored on room air. Pt is not on tele. All needs met at this time. Will continue care and monitoring as ordered.

## 2023-02-14 NOTE — CARE PLAN
The patient is Watcher - Medium risk of patient condition declining or worsening    Shift Goals  Clinical Goals: EMETERIO removal  Patient Goals: Go home  Family Goals: Go home    Progress made toward(s) clinical / shift goals:  ***    Patient is not progressing towards the following goals:

## 2023-02-14 NOTE — PROGRESS NOTES
[facility] Daily Progress Note    Date of Service  2/14/2023    R Team: R IM White Team   Attending: Compa Shetty M.d.  Senior Resident: Dr. Madison  Intern:  Dr. Mahan  Contact Number: [phone]    Chief Complaint  Srinivasan Cruz is a 67 y.o. male admitted 2/5/2023 with groin pain, loss of appetite, hip pain and constipation.    Hospital Course  Srinivasan Cruz is a 67 y.o. male with PMH of type 2 diabetes, HLD, and HTN who presented 2/5/2023 with chief complaint of 2 weeks of hip pain. He initially presented to an urgent care in [place] and was prescribed a muscle relaxant for suspected sciatica. When his pain continued he decided to present to the ED. CT abdomen/pelvis noted nonspecific hypodense mass vs complex fluid collection in the L medial hemipelvis abutting L iliopsoas. IR was consulted for drain placement. Cultures resulted positive for GBS. Blood glucose up to 300s.     Interval Problem Update  Drain output 0 over past 24 hrs. Feels great this morning- abdominal pain improved, walking without issues. Lantus increased to 14 U am yesterday and POC this am 189.     I have discussed this patient's plan of care and discharge plan at IDT rounds today with Case Management, Nursing, Nursing leadership, and other members of the IDT team.    Consultants/Specialty  None    Code Status  Full Code    Disposition  Patient is not medically cleared for discharge.   Anticipate discharge to to home with close outpatient follow-up.  I have placed the appropriate orders for post-discharge needs.    Review of Systems  Constitutional:  Negative for chills, diaphoresis, fever and weight loss.   HENT:  Negative for congestion, sinus pain and sore throat.    Respiratory:  Negative for cough, hemoptysis, sputum production, shortness of breath and wheezing.    Cardiovascular:  Negative for chest pain, palpitations, orthopnea, claudication, leg swelling and PND.   Gastrointestinal:  Positive  for abdominal pain (significantly improved). Negative for blood in stool, constipation, diarrhea, heartburn, melena, nausea and vomiting.   Genitourinary:  Negative for dysuria, flank pain, frequency, hematuria and urgency.   Musculoskeletal:  Positive for back pain, joint pain. Negative for myalgias,  falls and neck pain.        L hip pain and R shoulder pain   Skin:  Negative for itching and rash.   Neurological:  Negative for dizziness, seizures, loss of consciousness and headaches.     Physical Exam  Temp:  [36.3 °C (97.4 °F)-36.9 °C (98.4 °F)] 36.6 °C (97.8 °F)  Pulse:  [77-81] 77  Resp:  [17-18] 18  BP: (116-136)/(72-83) 136/83  SpO2:  [93 %-95 %] 94 %    Physical Exam  Vitals and nursing note reviewed.   Constitutional:       General: He is not in acute distress.     Appearance: Normal appearance. He is normal weight. He is not toxic-appearing or diaphoretic.   Cardiovascular:      Rate and Rhythm: Normal rate and regular rhythm.      Heart sounds: Normal heart sounds. No murmur heard.   Pulmonary:      Effort: Pulmonary effort is normal. No respiratory distress.      Breath sounds: Normal breath sounds. No stridor. No wheezing, rhonchi or rales.   Abdominal:      General: Abdomen is flat. Bowel sounds are normal. There is no distension.      Palpations: Abdomen is soft. There is no mass.      Tenderness: There is  mild abdominal tenderness.      Comments: LLQ discomfort with palpation (decreased from previous). Drain present, draining very small amount of cloudy fluid.   Musculoskeletal:         General: No tenderness, deformity or signs of injury.      Right lower leg: No edema.      Left lower leg: No edema.      Comment: Continued pain with L leg movement, significantly improved.     Fluids    Intake/Output Summary (Last 24 hours) at 2/14/2023 1302  Last data filed at 2/14/2023 0630  Gross per 24 hour   Intake --   Output 0 ml   Net 0 ml         Laboratory        Recent Labs     02/12/23  0139   SODIUM  132*   POTASSIUM 4.1   CHLORIDE 97   CO2 28   GLUCOSE 269*   BUN 14   CREATININE 0.92   CALCIUM 8.5                     Imaging  CT-ABDOMEN-PELVIS WITH    Result Date: 2/5/2023  1.  There is a nonspecific hypodense mass versus complex fluid collection in the left medial hemipelvis abutting the left iliopsoas musculature just medial to the acetabulum which extends into the left inguinal region. The overall appearance on CT is more suggestive of an infectious or inflammatory process rather than necrotic metastatic disease in this patient with known prostate cancer. This does not involve the left hip joint. 2.  There is otherwise no metastatic adenopathy in the abdomen or pelvis. 3.  Incidental simple left renal cortical cyst. No follow up imaging is recommended per consensus guidelines of the 2019 ACR Incidental Findings Committee for probably benign incidental simple appearing renal cystic lesion(s) based on imaging criteria.    CT-DRAIN-PERITONEAL    Result Date: 2/8/2023  1.  CT GUIDED PLACEMENT OF A PERCUTANEOUS DRAINAGE CATHETER IN A LEFT PELVIC SIDEWALL FLUID COLLECTION. 2.  THE CURRENT PLAN IS TO MONITOR DRAINAGE OUTPUT AND OBTAIN A FOLLOWUP CT SCAN IN 5-7 DAYS IF CLINICALLY INDICATED.       Assessment/Plan  Problem Representation:  *Pelvic abscess (HCC)- (present on admission)  Assessment & Plan  Ddx secondary to diverticulitis, UTI, or hematogenous or lymphatic seeding from a distant site (risk factor of diabetes)   -CT A/P 2/5 noted nonspecific hypodense mass vs complex fluid collection in L medial hemipelvis abutting L iliopsoas.   -Colonoscopy 2018 noted few colonic diverticuli. Possible source.   -2/7 ordered lactic acid to assess for bowel ischemia, lactic acid WNL.   Plan:   -Zosyn (started 2/9) to be continued per ID  -PRN 5-7.5 Oxycodone   -IR drain placed on 2/8, draining purulent fluid.  Growing strep agalactiae  -Repeat CTAP 2/14 pending to evaluate response to treatment, antibiotic option and  "duration, drain removal     Exanthematous rash -resolved  Assessment & Plan  -Rash most consistent with topical irritation, exanthematous drug eruption, vs folliculitis. Dced ceftriaxone and metronidazole and rash improved. May be folliculitis in the setting of significant sweating as well.         Normocytic anemia  Assessment & Plan  -On admission Hgb 11.7. Only other comparison lab available is from 4/20/2021 and was also low at 12.1 .   -anemia of chronic disease. Labs notable for low iron, low tibc, elevated ferritin.      Groin pain, left  Assessment & Plan  -Likely related to abscess. However, possible hernia; pt unable to stand so cannot do accurate hernia exam at this time. Will monitor for now.      Chronic right shoulder pain  Assessment & Plan  -PRN diclofenac gel  -Scheduled lidocaine patches      Elevated lipase  Assessment & Plan  -Pt does not meet pancreatitis criteria, will monitor for now.      Hypokalemia - resolved   Assessment & Plan  Continue to monitor, replete as necessary.       Benign essential HTN- (present on admission)  Assessment & Plan  -Prinzide not available, ordered seperately as Lisinopril 20 and HCTZ 12.5     Personal history of atrial fibrillation  Assessment & Plan  Patient reports he takes metoprolol for an \"irregular\" heart beat, likely atrial fibrillation.  -Continue home metoprolol XL 50      Controlled type 2 diabetes mellitus without complication, without long-term current use of insulin (HCC)- (present on admission)  Assessment & Plan  -home meds: metformin 1000 and Pioglitazone 30.   Plan:   -Holding home meds   -ISS   -Glargine 14 units QAM   -Hypoglycemia protocol      Mixed hyperlipidemia- (present on admission)  Assessment & Plan  -continue home atorvastatin   -aspirin likely not indicated given that he has not had a stroke or heart attack      History of prostate cancer  Assessment & Plan  -Adenocarcinoma of prostate. S/P RA RP and bilat PLND on 4/19/2021.    "     VTE prophylaxis: heparin ppx     I have performed a physical exam and reviewed and updated ROS and Plan today (2/14/2023). In review of yesterday's note (2/13/2023), there are no changes except as documented above.

## 2023-02-15 PROBLEM — E11.9 CONTROLLED TYPE 2 DIABETES MELLITUS WITHOUT COMPLICATION, WITHOUT LONG-TERM CURRENT USE OF INSULIN (HCC): Status: RESOLVED | Noted: 2018-08-21 | Resolved: 2023-02-15

## 2023-02-15 PROBLEM — E11.65 TYPE 2 DIABETES MELLITUS WITH HYPERGLYCEMIA (HCC): Status: ACTIVE | Noted: 2018-08-21

## 2023-02-15 LAB
GLUCOSE BLD STRIP.AUTO-MCNC: 250 MG/DL (ref 65–99)
GLUCOSE BLD STRIP.AUTO-MCNC: 264 MG/DL (ref 65–99)
GLUCOSE BLD STRIP.AUTO-MCNC: 324 MG/DL (ref 65–99)
GLUCOSE BLD STRIP.AUTO-MCNC: 336 MG/DL (ref 65–99)

## 2023-02-15 PROCEDURE — 700111 HCHG RX REV CODE 636 W/ 250 OVERRIDE (IP)

## 2023-02-15 PROCEDURE — 770006 HCHG ROOM/CARE - MED/SURG/GYN SEMI*

## 2023-02-15 PROCEDURE — 82962 GLUCOSE BLOOD TEST: CPT

## 2023-02-15 PROCEDURE — A9270 NON-COVERED ITEM OR SERVICE: HCPCS

## 2023-02-15 PROCEDURE — 700101 HCHG RX REV CODE 250

## 2023-02-15 PROCEDURE — 700105 HCHG RX REV CODE 258

## 2023-02-15 PROCEDURE — 700102 HCHG RX REV CODE 250 W/ 637 OVERRIDE(OP)

## 2023-02-15 PROCEDURE — 99231 SBSQ HOSP IP/OBS SF/LOW 25: CPT | Mod: GC | Performed by: HOSPITALIST

## 2023-02-15 RX ADMIN — INSULIN LISPRO 3 UNITS: 100 INJECTION, SOLUTION INTRAVENOUS; SUBCUTANEOUS at 09:41

## 2023-02-15 RX ADMIN — ATORVASTATIN CALCIUM 40 MG: 40 TABLET, FILM COATED ORAL at 18:03

## 2023-02-15 RX ADMIN — HEPARIN SODIUM 5000 UNITS: 5000 INJECTION INTRAVENOUS; SUBCUTANEOUS at 21:12

## 2023-02-15 RX ADMIN — INSULIN LISPRO 4 UNITS: 100 INJECTION, SOLUTION INTRAVENOUS; SUBCUTANEOUS at 21:10

## 2023-02-15 RX ADMIN — METOPROLOL SUCCINATE 50 MG: 50 TABLET, EXTENDED RELEASE ORAL at 05:22

## 2023-02-15 RX ADMIN — HYDROCHLOROTHIAZIDE 12.5 MG: 25 TABLET ORAL at 05:23

## 2023-02-15 RX ADMIN — HEPARIN SODIUM 5000 UNITS: 5000 INJECTION INTRAVENOUS; SUBCUTANEOUS at 05:23

## 2023-02-15 RX ADMIN — PIPERACILLIN AND TAZOBACTAM 3.38 G: 3; .375 INJECTION, POWDER, LYOPHILIZED, FOR SOLUTION INTRAVENOUS; PARENTERAL at 21:23

## 2023-02-15 RX ADMIN — OXYCODONE HYDROCHLORIDE 7.5 MG: 5 TABLET ORAL at 02:06

## 2023-02-15 RX ADMIN — PIPERACILLIN AND TAZOBACTAM 3.38 G: 3; .375 INJECTION, POWDER, LYOPHILIZED, FOR SOLUTION INTRAVENOUS; PARENTERAL at 05:14

## 2023-02-15 RX ADMIN — SENNOSIDES AND DOCUSATE SODIUM 2 TABLET: 50; 8.6 TABLET ORAL at 18:03

## 2023-02-15 RX ADMIN — INSULIN LISPRO 2 UNITS: 100 INJECTION, SOLUTION INTRAVENOUS; SUBCUTANEOUS at 13:58

## 2023-02-15 RX ADMIN — OXYCODONE HYDROCHLORIDE 5 MG: 5 TABLET ORAL at 06:29

## 2023-02-15 RX ADMIN — OXYCODONE HYDROCHLORIDE 7.5 MG: 5 TABLET ORAL at 20:39

## 2023-02-15 RX ADMIN — NYSTATIN: 100000 POWDER TOPICAL at 05:22

## 2023-02-15 RX ADMIN — HEPARIN SODIUM 5000 UNITS: 5000 INJECTION INTRAVENOUS; SUBCUTANEOUS at 14:01

## 2023-02-15 RX ADMIN — NYSTATIN: 100000 POWDER TOPICAL at 18:04

## 2023-02-15 RX ADMIN — LIDOCAINE 1 PATCH: 50 PATCH TOPICAL at 18:03

## 2023-02-15 RX ADMIN — PIPERACILLIN AND TAZOBACTAM 3.38 G: 3; .375 INJECTION, POWDER, LYOPHILIZED, FOR SOLUTION INTRAVENOUS; PARENTERAL at 14:01

## 2023-02-15 RX ADMIN — INSULIN LISPRO 4 UNITS: 100 INJECTION, SOLUTION INTRAVENOUS; SUBCUTANEOUS at 18:03

## 2023-02-15 RX ADMIN — LISINOPRIL 20 MG: 20 TABLET ORAL at 05:22

## 2023-02-15 ASSESSMENT — COGNITIVE AND FUNCTIONAL STATUS - GENERAL
SUGGESTED CMS G CODE MODIFIER MOBILITY: CH
SUGGESTED CMS G CODE MODIFIER DAILY ACTIVITY: CH
DAILY ACTIVITIY SCORE: 24
MOBILITY SCORE: 24

## 2023-02-15 ASSESSMENT — PAIN DESCRIPTION - PAIN TYPE
TYPE: ACUTE PAIN
TYPE: ACUTE PAIN;CHRONIC PAIN
TYPE: ACUTE PAIN

## 2023-02-15 ASSESSMENT — PAIN SCALES - PAIN ASSESSMENT IN ADVANCED DEMENTIA (PAINAD)
BODYLANGUAGE: RELAXED
CONSOLABILITY: NO NEED TO CONSOLE
FACIALEXPRESSION: SMILING OR INEXPRESSIVE

## 2023-02-15 ASSESSMENT — PAIN SCALES - WONG BAKER: WONGBAKER_NUMERICALRESPONSE: HURTS A LITTLE MORE

## 2023-02-15 NOTE — CARE PLAN
The patient is Stable - Low risk of patient condition declining or worsening    Shift Goals  Clinical Goals: aracelis removal  Patient Goals: discharge from hospital  Family Goals: ellis    Progress made toward(s) clinical / shift goals:  Pain Management after Surgery, Injury or Illness    What should I expect if I have pain?  Some pain may be normal.  It is important to know that it may not be possible to completely eliminate your pain.  Our goal is to help you to manage your pain so that you can get back to your normal routine as soon as possible.  We will work together to create a plan to manage your pain and track the progress of the plan.  If you have questions about your care, please tell your nurse or provider.  How is my pain measured?  Your pain will be measured on a scale of 0 to 10.  The pain rating scale will help you to score your pain based on your ability to function with that pain.  For example, a score of:  0 = No pain  5 = Pain interrupts some activities  10 = Pain is as bad as it can be, nothing else matters  Remember, some pain is expected following illness, injury or surgery.  How will my pain be treated?  You may be offered medication to treat your pain.  You can also use non-medicine treatments to help manage your pain.  If you have severe, uncontrolled pain, you may be prescribed narcotics, also known as opioids.  You have the right to learn about your options and work with your care team to find the best treatment to manage your pain.  Non-Opioid Medicines  Many effective medicines do not need a prescription. Examples include:  Acetaminophen, which you may know as Tylenol®  Ibuprofen, which you may know as Advil® or Motrin®  Aspirin  Other low risk medicines require a prescription and are helpful for treating pain. Examples include:  o Celecoxib, which you may know as Celebrex®  Alternative Therapies  Alternative therapies can be very helpful in managing pain. You can try:  Ice or heat packs as  recommended by your care team.  Massage, relaxation techniques or meditation.  Changing positions in bed.  Watching TV or listening to music.  Opioids, also known as Narcotics  Opioids should only be used to treat severe pain that cannot be controlled by other methods.  Opioids have been shown to increase your risk of complications.  Opioids are highly addictive.  Opioids should only be used in the lowest effective dose, for a limited amount of time.  Opioids require a prescription. Some examples include:  Tramadol, known as Ultram®.  Hydrocodone with acetaminophen, known as Lortab®, Vicodin®, Norco®.  Oxycodone with acetaminophen, known as Percocet®, or Roxicet®.  Oxycodone, known as OxyContin®.  Morphine, known as MS Contin®.  What will happen after I go home from the hospital?  Your care team will discuss your ongoing care plan with you before you leave.  You will be given detailed instructions for any medicine and follow up care.  You may be given a prescription for medicines to take when you go home.  Be sure to tell your care team if you have concerns about caring for yourself at home. Common concerns may include stairs, or living alone.  How should I manage pain when I go home?  Always use non-opioid and non-medicine options first.  Take non-opioid medicine regularly, as instructed by your care team.  Avoid doing things that make your pain worse like heavy lifting or straining.  Use opioids only to treat severe pain that is not controlled by other methods.  Always follow the instructions of your care team.  Always take the lowest effective dose.  Do not take more than prescribed.  Do not mix with sleeping pills or alcohol.  Stop taking opioids when the pain can be managed by other methods listed above.  You may also be referred to a pain specialist when needed.    IMPORTANT INFORMATION:  Side effects of Opiates may include:  Sleepiness  Dizziness  Nausea and/or vomiting  Constipation  Decreased  breathing  Addiction  Overdose  Death    Opiate dependency and addiction risk:  The risk of dependency increases after 3 days of continuous use.  There are many local resources to help with dependency issues.  Opiate dependency can develop easily. Don’t feel ashamed.  Speak to your care team if you have concerns.  General medicine safety:  Keep all medicines in a safe place, out of sight so they cannot be taken by someone else.  Keep out of reach of children.  Never mix opioids with sleeping pills, over the counter sleep aids or alcohol.  Do not drive while taking opioids.  Be careful at home when cooking, bathing, showering or using stairs.  You may be more likely to hurt yourself or fall.  For anyone taking opioids, watch for excessive sleepiness or decreased breathing as a sign of overdose. Try to arouse the person if you are concerned. Call 911 if you are unable to awaken them OR if their breathing is shallow, slow, or irregular.  Proper medicine disposal:  Empty liquid medicine from containers, open capsules or crush tablets and mix with eleuterio litter, dirt or old coffee grounds. Place the mixture into a sealed bag or container and throw it in the trash.  Take medicines to a local drug takeback center, for a list visit: https://apps.deadiversion.ZingCheckoutoTravelmenu.gov/pubdispsearch/spring/main?execution=e1s2  Use a home drug disposal pouch.  Contact your local pharmacy for help.    Patient is not progressing towards the following goals:

## 2023-02-15 NOTE — CARE PLAN
Problem: Pain - Standard  Goal: Alleviation of pain or a reduction in pain to the patient’s comfort goal  Outcome: Progressing PRN pian meds ordered with good effect     Problem: Communication  Goal: The ability to communicate needs accurately and effectively will improve  Outcome: Progressing teaching on drain site.    The patient is Stable - Low risk of patient condition declining or worsening    Shift Goals  Clinical Goals: EMETERIO site  Patient Goals: discharge soon  Family Goals: ellis    Progress made toward(s) clinical / shift goals:  Assumed care of patient at 0715. Received bedside report from night shift RN. Bed is locked and lowest position, call light within reach. Treaded socks in place. Patient updated on plan of care, no complaints or pain at this time. White board updated. Pt AxOx4 Patient breathing pattern is unlabored. Pt is medical.Drain site CDI.  All needs met at this time. Will continue care and monitoring as ordered.

## 2023-02-15 NOTE — PROGRESS NOTES
[facility] Internal Medicine Daily Progress Note    Date of Service  2/15/2023    [facility] Team: [facility] White Team   Attending: Compa Shetty M.d.  Senior Resident: Dr. Madison  Intern:  Dr. Mahan  Contact Number: [phone]    Chief Complaint  Srinivasan Cruz is a 67 y.o. male admitted 2/5/2023 with groin pain, loss of appetite, hip pain and constipation.    Hospital Course  Srinivasan Cruz is a 67 y.o. male with PMH of type 2 diabetes, HLD, and HTN who presented 2/5/2023 with chief complaint of 2 weeks of hip pain. He initially presented to an urgent care in [place] and was prescribed a muscle relaxant for suspected sciatica. When his pain continued he decided to present to the ED. CT abdomen/pelvis noted nonspecific hypodense mass vs complex fluid collection in the L medial hemipelvis abutting L iliopsoas. IR was consulted for drain placement. Cultures resulted positive for GBS. Blood glucose up to 300s so continuing to titrate Lantus.  Following reduction in drainage output, repeat CT AP to evaluate abscess.     Interval Problem Update  Drain output 5 cc over the past 24 hours.  Patient feels great this morning: Abdominal pain is largely resolved, leg pain is resolved.  CTAP repeat showed iliopsoas abscess decreased in size though size was still 10 x 1.6 cm (prior 5 x 3 x 5 cm).  Denies diarrhea, last bowel movement yesterday solid.     I have discussed this patient's plan of care and discharge plan at IDT rounds today with Case Management, Nursing, Nursing leadership, and other members of the IDT team.    Consultants/Specialty  None    Code Status  Full Code    Disposition  Patient is not medically cleared for discharge.   Anticipate discharge to to home with close outpatient follow-up.  I have placed the appropriate orders for post-discharge needs.    Review of Systems  Constitutional:  Negative for chills, diaphoresis, fever and weight loss.   HENT:  Negative for congestion, sinus pain and sore  throat.    Respiratory:  Negative for cough, hemoptysis, sputum production, shortness of breath and wheezing.    Cardiovascular:  Negative for chest pain, palpitations, orthopnea, claudication, leg swelling and PND.   Gastrointestinal:  Positive for abdominal pain (almost resolved). Negative for blood in stool, constipation, diarrhea, heartburn, melena, nausea and vomiting.   Genitourinary:  Negative for dysuria, flank pain, frequency, hematuria and urgency.   Musculoskeletal:  Positive for back pain, joint pain. Negative for myalgias,  falls and neck pain.        L hip pain and R shoulder pain   Skin:  Negative for itching and rash.   Neurological:  Negative for dizziness, seizures, loss of consciousness and headaches.     Physical Exam  Temp:  [36.4 °C (97.5 °F)-37.1 °C (98.7 °F)] 36.4 °C (97.5 °F)  Pulse:  [70-78] 78  Resp:  [18] 18  BP: (125-146)/(62-78) 146/77  SpO2:  [92 %-96 %] 92 %    Physical Exam  Vitals and nursing note reviewed.   Constitutional:       General: He is not in acute distress.     Appearance: Normal appearance. He is normal weight. He is not toxic-appearing or diaphoretic.   Cardiovascular:      Rate and Rhythm: Normal rate and regular rhythm.      Heart sounds: Normal heart sounds. No murmur heard.   Pulmonary:      Effort: Pulmonary effort is normal. No respiratory distress.      Breath sounds: Normal breath sounds. No stridor. No wheezing, rhonchi or rales.   Abdominal:      General: Abdomen is flat. Bowel sounds are normal. There is no distension.      Palpations: Abdomen is soft. There is no mass.      Tenderness: There is  mild abdominal tenderness (only around drain site).      Comments: LLQ discomfort with palpation (decreased from previous). Drain present, draining small amount of cloudy fluid (flushed prior to exam).   Musculoskeletal:         General: No tenderness, deformity or signs of injury.      Right lower leg: No edema.      Left lower leg: No edema.      Comment:      Fluids    Intake/Output Summary (Last 24 hours) at 2/15/2023 1323  Last data filed at 2/15/2023 0800  Gross per 24 hour   Intake 250 ml   Output 5 ml   Net 245 ml       Laboratory  No new    Imaging  CT-ABDOMEN-PELVIS WITH    Result Date: 2/5/2023  1.  There is a nonspecific hypodense mass versus complex fluid collection in the left medial hemipelvis abutting the left iliopsoas musculature just medial to the acetabulum which extends into the left inguinal region. The overall appearance on CT is more suggestive of an infectious or inflammatory process rather than necrotic metastatic disease in this patient with known prostate cancer. This does not involve the left hip joint. 2.  There is otherwise no metastatic adenopathy in the abdomen or pelvis. 3.  Incidental simple left renal cortical cyst. No follow up imaging is recommended per consensus guidelines of the 2019 ACR Incidental Findings Committee for probably benign incidental simple appearing renal cystic lesion(s) based on imaging criteria.    CT-DRAIN-PERITONEAL    Result Date: 2/8/2023  1.  CT GUIDED PLACEMENT OF A PERCUTANEOUS DRAINAGE CATHETER IN A LEFT PELVIC SIDEWALL FLUID COLLECTION. 2.  THE CURRENT PLAN IS TO MONITOR DRAINAGE OUTPUT AND OBTAIN A FOLLOWUP CT SCAN IN 5-7 DAYS IF CLINICALLY INDICATED.       Assessment/Plan  Problem Representation:  *Pelvic abscess (HCC)- (present on admission)  Assessment & Plan  Ddx secondary to diverticulitis, UTI, or hematogenous or lymphatic seeding from a distant site (risk factor of diabetes)   -CT A/P 2/5 noted nonspecific hypodense mass vs complex fluid collection in L medial hemipelvis abutting L iliopsoas.   -Colonoscopy 2018 noted few colonic diverticuli. Possible source.   -2/7 ordered lactic acid to assess for bowel ischemia, lactic acid WNL.   Plan:   -Zosyn (started 2/9) to be continued per ID  -PRN 5-7.5 Oxycodone   -IR drain placed on 2/8, draining purulent fluid.  Growing strep agalactiae  -Repeat CTAP  "2/14 with reported decrease in abscess size (appears to have elongated but narrowed): Pending response from IR as to course of action with these results       Type 2 diabetes mellitus with hyperglycemia- (present on admission)  Assessment & Plan  -New A1c 10.3  -home meds: metformin 1000 and Pioglitazone 30.   Plan:   -Holding home meds   -ISS   -Glargine 18 units QAM, titrate as needed to ensure good blood glucose control in setting of infection  -Hypoglycemia protocol      Exanthematous rash -resolved  Assessment & Plan  -Rash most consistent with topical irritation, exanthematous drug eruption, vs folliculitis. Dced ceftriaxone and metronidazole and rash improved. May be folliculitis in the setting of significant sweating as well.         Normocytic anemia  Assessment & Plan  -On admission Hgb 11.7. Only other comparison lab available is from 4/20/2021 and was also low at 12.1 .   -anemia of chronic disease. Labs notable for low iron, low tibc, elevated ferritin.      Groin pain, left  Assessment & Plan  -Likely related to abscess. However, possible hernia; pt unable to stand so cannot do accurate hernia exam at this time. Will monitor for now.      Chronic right shoulder pain  Assessment & Plan  -PRN diclofenac gel  -Scheduled lidocaine patches      Elevated lipase  Assessment & Plan  -Pt does not meet pancreatitis criteria, will monitor for now.      Hypokalemia - resolved   Assessment & Plan  Continue to monitor, replete as necessary.       Benign essential HTN- (present on admission)  Assessment & Plan  -Prinzide not available, ordered seperately as Lisinopril 20 and HCTZ 12.5     Personal history of atrial fibrillation  Assessment & Plan  Patient reports he takes metoprolol for an \"irregular\" heart beat, likely atrial fibrillation.  -Continue home metoprolol XL 50        Mixed hyperlipidemia- (present on admission)  Assessment & Plan  -continue home atorvastatin   -aspirin likely not indicated given that he " has not had a stroke or heart attack      History of prostate cancer  Assessment & Plan  -Adenocarcinoma of prostate. S/P RA RP and bilat PLND on 4/19/2021.        VTE prophylaxis: heparin ppx     I have performed a physical exam and reviewed and updated ROS and Plan today (2/15/2023). In review of yesterday's note (2/14/2023), there are no changes except as documented above.

## 2023-02-16 ENCOUNTER — PHARMACY VISIT (OUTPATIENT)
Dept: PHARMACY | Facility: MEDICAL CENTER | Age: 68
End: 2023-02-16
Payer: COMMERCIAL

## 2023-02-16 VITALS
TEMPERATURE: 97.5 F | RESPIRATION RATE: 18 BRPM | HEART RATE: 89 BPM | HEIGHT: 71 IN | SYSTOLIC BLOOD PRESSURE: 132 MMHG | DIASTOLIC BLOOD PRESSURE: 78 MMHG | WEIGHT: 188.93 LBS | OXYGEN SATURATION: 95 % | BODY MASS INDEX: 26.45 KG/M2

## 2023-02-16 LAB
GLUCOSE BLD STRIP.AUTO-MCNC: 233 MG/DL (ref 65–99)
GLUCOSE BLD STRIP.AUTO-MCNC: 332 MG/DL (ref 65–99)

## 2023-02-16 PROCEDURE — 700111 HCHG RX REV CODE 636 W/ 250 OVERRIDE (IP)

## 2023-02-16 PROCEDURE — 700105 HCHG RX REV CODE 258

## 2023-02-16 PROCEDURE — A9270 NON-COVERED ITEM OR SERVICE: HCPCS | Performed by: HOSPITALIST

## 2023-02-16 PROCEDURE — 700102 HCHG RX REV CODE 250 W/ 637 OVERRIDE(OP): Performed by: HOSPITALIST

## 2023-02-16 PROCEDURE — RXMED WILLOW AMBULATORY MEDICATION CHARGE: Performed by: HOSPITALIST

## 2023-02-16 PROCEDURE — A9270 NON-COVERED ITEM OR SERVICE: HCPCS

## 2023-02-16 PROCEDURE — 700102 HCHG RX REV CODE 250 W/ 637 OVERRIDE(OP)

## 2023-02-16 PROCEDURE — 82962 GLUCOSE BLOOD TEST: CPT | Mod: 91

## 2023-02-16 PROCEDURE — 99238 HOSP IP/OBS DSCHRG MGMT 30/<: CPT | Mod: GC | Performed by: HOSPITALIST

## 2023-02-16 RX ORDER — AMOXICILLIN AND CLAVULANATE POTASSIUM 500; 125 MG/1; MG/1
1 TABLET, FILM COATED ORAL ONCE
Status: COMPLETED | OUTPATIENT
Start: 2023-02-16 | End: 2023-02-16

## 2023-02-16 RX ORDER — AMOXICILLIN AND CLAVULANATE POTASSIUM 875; 125 MG/1; MG/1
1 TABLET, FILM COATED ORAL 2 TIMES DAILY
Qty: 27 TABLET | Refills: 0 | Status: ACTIVE | OUTPATIENT
Start: 2023-02-16 | End: 2023-03-02

## 2023-02-16 RX ADMIN — HEPARIN SODIUM 5000 UNITS: 5000 INJECTION INTRAVENOUS; SUBCUTANEOUS at 04:55

## 2023-02-16 RX ADMIN — SENNOSIDES AND DOCUSATE SODIUM 2 TABLET: 50; 8.6 TABLET ORAL at 04:53

## 2023-02-16 RX ADMIN — HYDROCHLOROTHIAZIDE 12.5 MG: 25 TABLET ORAL at 04:54

## 2023-02-16 RX ADMIN — OXYCODONE HYDROCHLORIDE 5 MG: 5 TABLET ORAL at 12:13

## 2023-02-16 RX ADMIN — LISINOPRIL 20 MG: 20 TABLET ORAL at 04:54

## 2023-02-16 RX ADMIN — OXYCODONE HYDROCHLORIDE 7.5 MG: 5 TABLET ORAL at 01:01

## 2023-02-16 RX ADMIN — INSULIN LISPRO 4 UNITS: 100 INJECTION, SOLUTION INTRAVENOUS; SUBCUTANEOUS at 13:48

## 2023-02-16 RX ADMIN — NYSTATIN: 100000 POWDER TOPICAL at 04:59

## 2023-02-16 RX ADMIN — AMOXICILLIN AND CLAVULANATE POTASSIUM 1 TABLET: 500; 125 TABLET, FILM COATED ORAL at 11:24

## 2023-02-16 RX ADMIN — HEPARIN SODIUM 5000 UNITS: 5000 INJECTION INTRAVENOUS; SUBCUTANEOUS at 13:50

## 2023-02-16 RX ADMIN — PIPERACILLIN AND TAZOBACTAM 3.38 G: 3; .375 INJECTION, POWDER, LYOPHILIZED, FOR SOLUTION INTRAVENOUS; PARENTERAL at 06:23

## 2023-02-16 RX ADMIN — METOPROLOL SUCCINATE 50 MG: 50 TABLET, EXTENDED RELEASE ORAL at 06:00

## 2023-02-16 RX ADMIN — OXYCODONE HYDROCHLORIDE 7.5 MG: 5 TABLET ORAL at 04:53

## 2023-02-16 ASSESSMENT — PAIN SCALES - PAIN ASSESSMENT IN ADVANCED DEMENTIA (PAINAD)
FACIALEXPRESSION: SMILING OR INEXPRESSIVE
CONSOLABILITY: NO NEED TO CONSOLE
BREATHING: NORMAL
BODYLANGUAGE: RELAXED

## 2023-02-16 ASSESSMENT — PAIN SCALES - WONG BAKER
WONGBAKER_NUMERICALRESPONSE: HURTS A LITTLE MORE
WONGBAKER_NUMERICALRESPONSE: HURTS JUST A LITTLE BIT

## 2023-02-16 ASSESSMENT — PAIN DESCRIPTION - PAIN TYPE
TYPE: ACUTE PAIN
TYPE: ACUTE PAIN

## 2023-02-16 NOTE — DISCHARGE SUMMARY
[facility] Internal Medicine Discharge Summary    Attending: Compa Shetty M.d.  Senior Resident: Dr. Madison  Intern:  Dr. Mahan  Contact Number: [phone]    CHIEF COMPLAINT ON ADMISSION  Chief Complaint   Patient presents with    Groin Pain     L groin x 10 days, denies pain with urination but pt has decreased sensation s/t radiation tx for prostate cancer 1 year ago, denies hematuria     Loss of Appetite     X 2 weeks, states he has felt ill and had chills/tremors two weeks ago    Hip Pain     L posterior hip X 2 weeks, pt states he has been staying in bed and sleeping mostly for 2 weeks, believes his hip hurts from laying on it excessively, pain keeps pt from moving LLE well    Constipation     LBM 3 days ago but states it was small, pt is taking stool softeners        Reason for Admission  Trouble Urinating     Admission Date  2/5/2023    CODE STATUS  Full Code    HPI & HOSPITAL COURSE  This is a 67 y.o. male here with abdominal abscess   Srinivasan Cruz is a 67 y.o. male with PMH of type 2 diabetes, HLD, and HTN who presented 2/5/2023 with chief complaint of 2 weeks of hip pain. He initially presented to an urgent care in [place] and was prescribed a muscle relaxant for suspected sciatica. When his pain continued he decided to present to the ED. CT abdomen/pelvis noted nonspecific hypodense 5*3*5 cm mass vs complex fluid collection in the L medial hemipelvis abutting L iliopsoas. IR was consulted for drain placement which was placed 2/8/23. Cultures resulted positive for strep agalactiae (sensitivities not performed), so per infectious disease recommendations he was started on Zosyn 2/9. Throughout hospitalization his blood glucose was elevated, averaging 200s-300s despite increasing hospital-initiated Lantus and A1c was collected which resulted at 10.3.  Following reduction in drainage output on 2/14, a repeat CTAP was done to evaluate abscess response to treatment. It was measured at 10*1.6 cm and  IR was consulted to determine further steps. Given that the fluid collection was too small to consider adding a drain or repositioning the current one, as well as the thought that the remaining fluid may be irrigant instead, removing the drain was recommended. Per ID, Augmentin 875 BID for 2 weeks was initiated (tolerated trial dose without drug reaction) and the drain was removed on the morning of discharge.     Therefore, he is discharged in good and stable condition to home with close outpatient follow-up.    The patient met 2-midnight criteria for an inpatient stay at the time of discharge.    Discharge Date  2/16/2023    Physical Exam on Day of Discharge  Vitals:    02/16/23 0825   BP: 132/78   Pulse: 89   Resp: 18   Temp: 36.4 °C (97.5 °F)   SpO2: 95%      Physical Exam  Vitals and nursing note reviewed.   Constitutional:       General: He is not in acute distress.     Appearance: Normal appearance. He is normal weight. He is not toxic-appearing or diaphoretic.   Cardiovascular:      Rate and Rhythm: Normal rate and regular rhythm.      Heart sounds: Normal heart sounds. No murmur heard.   Pulmonary:      Effort: Pulmonary effort is normal. No respiratory distress.      Breath sounds: Normal breath sounds. No stridor. No wheezing, rhonchi or rales.   Abdominal:      General: Abdomen is flat. Bowel sounds are normal. There is no distension.      Palpations: Abdomen is soft. There is no mass.      Tenderness: There is  mild abdominal tenderness (only around drain site).      Comments: LLQ discomfort with palpation (decreased from previous). New drain bulb present, draining small amount of cloudy fluid (flushed prior to exam).   Musculoskeletal:         General: No tenderness, deformity or signs of injury.      Right lower leg: No edema.      Left lower leg: No edema.       FOLLOW UP ITEMS POST DISCHARGE    Pelvic abscess- (present on admission)  Assessment & Plan  Abscess grew Strep agalactiae  Ddx secondary to  "diverticulitis, UTI, or hematogenous or lymphatic seeding from a distant site (risk factor of diabetes)   -CT A/P 2/5 noted nonspecific hypodense mass vs complex fluid collection in L medial hemipelvis abutting L iliopsoas.   -Repeat CTAP 2/14 with reported decrease in abscess size (appears to have elongated but narrowed)  -Colonoscopy 2018 noted few colonic diverticuli. Possible source.     Plan:   -Augmentin 875 2/16-3/1  -ID follow up to evaluate for resolution, consider repeat CTAP       Type 2 diabetes mellitus with hyperglycemia- (present on admission)  Assessment & Plan      -New A1c 10.3  -home meds: Tresiba 60U, metformin 1000    Plan:   -Consider adjusting home diabetes medications       Personal history of atrial fibrillation  Assessment & Plan  Patient reports he takes metoprolol for an \"irregular\" heart beat, likely atrial fibrillation.    Plan:  -Follow up with cardiology for need for metoprolol XL 50         DISCHARGE DIAGNOSES  Principal Problem:    Pelvic abscess in male (HCC) POA: Yes  Active Problems:    Type 2 diabetes mellitus with hyperglycemia (HCC) POA: Yes    Benign essential HTN POA: Yes    Mixed hyperlipidemia POA: Yes    Elevated lipase POA: Unknown    Chronic right shoulder pain POA: Unknown    Groin pain, left POA: Unknown    Normocytic anemia POA: Unknown    History of prostate cancer POA: Unknown    Rash and nonspecific skin eruption POA: Unknown    Personal history of atrial fibrillation POA: Unknown  Resolved Problems:    * No resolved hospital problems. *      FOLLOW UP  No future appointments.  No follow-up provider specified.    MEDICATIONS ON DISCHARGE     Medication List        START taking these medications        Instructions   amoxicillin-clavulanate 875-125 MG Tabs  Commonly known as: AUGMENTIN   Take 1 Tablet by mouth 2 times a day for 14 days.  Dose: 1 Tablet            CHANGE how you take these medications        Instructions   atorvastatin 40 MG Tabs  What changed: when " to take this  Commonly known as: LIPITOR   Doctor's comments: This prescription was filled on 1/16/2019. Any refills authorized will be placed on file.  TAKE ONE TABLET BY MOUTH EVERY DAY     lisinopril-hydrochlorothiazide 20-12.5 MG per tablet  What changed: when to take this  Commonly known as: PRINZIDE   Doctor's comments: This prescription was filled on 1/16/2019. Any refills authorized will be placed on file.  TAKE ONE TABLET BY MOUTH EVERY DAY     metoprolol SR 50 MG Tb24  What changed: when to take this  Commonly known as: TOPROL XL   Doctor's comments: This prescription was filled on 1/16/2019. Any refills authorized will be placed on file.  TAKE ONE TABLET BY MOUTH EVERY DAY            CONTINUE taking these medications        Instructions   aspirin 81 MG Chew chewable tablet  Commonly known as: ASA   Take 81 mg by mouth every day.  Dose: 81 mg     metformin 1000 MG tablet  Commonly known as: GLUCOPHAGE   Doctor's comments: This prescription was filled on 1/16/2019. Any refills authorized will be placed on file.  TAKE ONE TABLET BY MOUTH TWICE DAILY WITH MEALS     nitroglycerin 0.4 MG Subl  Commonly known as: NITROSTAT   Place 0.4 mg under tongue every 5 minutes as needed for Chest Pain.  Dose: 0.4 mg     Tresiba FlexTouch 200 UNIT/ML Sopn  Generic drug: Insulin Degludec   Inject 60 Units under the skin at bedtime.  Dose: 60 Units              Allergies  Allergies   Allergen Reactions    Ceftriaxone Rash     Concern for rash per primary team 2/8/23.    Metronidazole Rash     Concern for rash per primary team 2/8/23.    Seasonal Shortness of Breath       DIET  Orders Placed This Encounter   Procedures    Diet Order Diet: Consistent CHO (Diabetic) (double protein order please. double eggs in AM especially, Double Chicken, and double dinner protein and vege. thanks); Nutrient modifications: (optional): High Protein     Standing Status:   Standing     Number of Occurrences:   1     Order Specific Question:    Diet:     Answer:   Consistent CHO (Diabetic) [4]     Comments:   double protein order please. double eggs in AM especially, Double Chicken, and double dinner protein and vege. thanks     Order Specific Question:   Nutrient modifications: (optional)     Answer:   High Protein [4]       ACTIVITY  As tolerated.  Weight bearing as tolerated    CONSULTATIONS  Infectious Disease  Interventional Radiology    PROCEDURES  CT GUIDED PLACEMENT OF A PERCUTANEOUS DRAINAGE CATHETER IN A LEFT PELVIC SIDEWALL FLUID COLLECTION 2/8/23    LABORATORY  Lab Results   Component Value Date    SODIUM 132 (L) 02/12/2023    POTASSIUM 4.1 02/12/2023    CHLORIDE 97 02/12/2023    CO2 28 02/12/2023    GLUCOSE 269 (H) 02/12/2023    BUN 14 02/12/2023    CREATININE 0.92 02/12/2023        Lab Results   Component Value Date    WBC 7.5 02/11/2023    HEMOGLOBIN 10.6 (L) 02/11/2023    HEMATOCRIT 32.4 (L) 02/11/2023    PLATELETCT 390 02/11/2023        Total time of the discharge process exceeds 60 minutes.

## 2023-02-16 NOTE — DISCHARGE PLANNING
Case Management Discharge Planning    Admission Date: 2/5/2023  GMLOS: 2.8  ALOS: 11    6-Clicks ADL Score: 24  6-Clicks Mobility Score: 24      Anticipated Discharge Dispo:  Home    DME Needed: No    Escalations Completed: None    Medically Clear: Yes    Next Steps: None    Barriers to Discharge: None    CM met with patient at bedside regarding DC planning.  He will DC home and has a ride from his spouse.  IMM signed.  No further CM needs at this time.

## 2023-02-16 NOTE — CARE PLAN
The patient is Stable - Low risk of patient condition declining or worsening    Shift Goals  Clinical Goals: Pain control  Patient Goals: REST  Family Goals: ellis    Progress made toward(s) clinical / shift goals:    Problem: Pain - Standard  Goal: Alleviation of pain or a reduction in pain to the patient’s comfort goal  Outcome: Not Progressing  Note: Pt pain does not go below 4/10. Pain medication given      Problem: Knowledge Deficit - Standard  Goal: Patient and family/care givers will demonstrate understanding of plan of care, disease process/condition, diagnostic tests and medications  Outcome: Progressing     Problem: Communication  Goal: The ability to communicate needs accurately and effectively will improve  Outcome: Progressing     Problem: Nutrition  Goal: Patient's nutritional and fluid intake will be adequate or improve  Outcome: Met     Problem: Urinary Elimination  Goal: Establish and maintain regular urinary output  Outcome: Met     Problem: Bowel Elimination  Goal: Establish and maintain regular bowel function  Outcome: Met     Problem: Mobility  Goal: Patient's capacity to carry out activities will improve  Outcome: Met  Note: Pt ambulating      Problem: Self Care  Goal: Patient will have the ability to perform ADLs independently or with assistance (bathe, groom, dress, toilet and feed)  Outcome: Met       Patient is not progressing towards the following goals:      Problem: Pain - Standard  Goal: Alleviation of pain or a reduction in pain to the patient’s comfort goal  Outcome: Not Progressing  Note: Pt pain does not go below 4/10. Pain medication given

## 2023-02-17 NOTE — PROGRESS NOTES
Patient is being discharged home from the discharge lounge. Patient is A&Ox4. IV removed on unit. Discharge instructions provided to patient and reviewed. Patient verbalized understanding and all questions and concerns were addressed. Patient escorted to vehicle by discharge lounge staff.

## 2023-03-06 ENCOUNTER — OFFICE VISIT (OUTPATIENT)
Dept: ENDOCRINOLOGY | Facility: MEDICAL CENTER | Age: 68
End: 2023-03-06
Payer: MEDICARE

## 2023-03-06 VITALS
BODY MASS INDEX: 26.74 KG/M2 | HEART RATE: 84 BPM | SYSTOLIC BLOOD PRESSURE: 112 MMHG | DIASTOLIC BLOOD PRESSURE: 60 MMHG | OXYGEN SATURATION: 97 % | WEIGHT: 191 LBS | HEIGHT: 71 IN

## 2023-03-06 DIAGNOSIS — E78.2 MIXED HYPERLIPIDEMIA: ICD-10-CM

## 2023-03-06 DIAGNOSIS — I10 BENIGN ESSENTIAL HTN: ICD-10-CM

## 2023-03-06 DIAGNOSIS — Z79.4 TYPE 2 DIABETES MELLITUS WITH HYPERGLYCEMIA, WITH LONG-TERM CURRENT USE OF INSULIN (HCC): ICD-10-CM

## 2023-03-06 DIAGNOSIS — E11.65 TYPE 2 DIABETES MELLITUS WITH HYPERGLYCEMIA, WITH LONG-TERM CURRENT USE OF INSULIN (HCC): ICD-10-CM

## 2023-03-06 PROCEDURE — 99204 OFFICE O/P NEW MOD 45 MIN: CPT

## 2023-03-06 PROCEDURE — 99212 OFFICE O/P EST SF 10 MIN: CPT

## 2023-03-06 RX ORDER — INSULIN DEGLUDEC 200 U/ML
50 INJECTION, SOLUTION SUBCUTANEOUS
Qty: 15 ML | Refills: 5 | COMMUNITY
Start: 2023-03-06

## 2023-03-06 RX ORDER — GLIMEPIRIDE 4 MG/1
4 TABLET ORAL EVERY MORNING
Qty: 90 TABLET | Refills: 3 | Status: SHIPPED | OUTPATIENT
Start: 2023-03-06 | End: 2023-12-21

## 2023-03-06 ASSESSMENT — FIBROSIS 4 INDEX: FIB4 SCORE: 0.99

## 2023-03-06 NOTE — PROGRESS NOTES
RN-CDE Note    Subjective:   Endocrinology Clinic Progress Note  PCP: Pcp Not In Computer    HPI:  Srinivasan Cruz is a 67 y.o. old patient who is seen today by the Diabetes Nurse Specialist for review of Type 2 Diabetes.  Recent changes in health: He was recently hospitalized with an abdominal abscess.  DM:   Last A1c:   Lab Results   Component Value Date/Time    HBA1C 10.3 (H) 2023 02:41 AM        A1C GOAL: < 7    Diabetes Medications:   Tresiba 60 units daily  Metformin 1000 mg BID      Exercise: Walking 4 miles daily and going to the gym  Diet: Trying to eat lower carbohydrates.  Patient's body mass index is unknown because there is no height or weight on file. Exercise and nutrition counseling were performed at this visit.    Glucose monitoring frequency: Testing twice daily  Fastin-180 and before bed 177-283  Hypoglycemic episodes: no  Last Retinal Exam:  Had 2 months ago in [facility] and will get the report  Daily Foot Exam: Yes   Foot Exam:  Monofilament: done  No results found for: MICROALBCALC, MALBCRT, MALBEXCR, [id number], MICROALBUR, MICRALB, UMICROALBUM, MICROALBTIM     ACR Albumin/Creatinine Ratio goal <30     HTN:   Blood pressure goal <130/<80 .   Currently Rx ACE/ARB: Yes     Dyslipidemia:    No results found for: CHOLSTRLTOT, LDL, HDL, TRIGLYCERIDE      Currently Rx Statin: Yes     He  reports that he has never smoked. He has never used smokeless tobacco.      Plan:     Discussed and educated on:   - All medications, side effects and compliance (discussed carefully)  - Annual eye examinations at Ophthalmology  - Home glucose monitoring emphasized  - Weight control and daily exercise    Recommended medication changes: He will need to get back on Farxiga or if he can not afford it Glimepiride to cover his food.

## 2023-03-06 NOTE — PROGRESS NOTES
Chief Complaint:  Consult requested by Pcp Not In Computer for initial evaluation of Type 2 Diabetes Mellitus    HPI:   Sriniavsan Cruz is a 67 y.o. male with Type 2 Diabetes Mellitus diagnosed 10 years ago.  He denies hospitalizations for DKA in the past. He has history of prostate cancer. He developed diabetic rash due to stress a few months ago. He will be following dermatology.     Diabetes Type 2    A1c on 23 was 10.3    Current medications:  Tresiba 60 units every evening  Metformin 1000 mg twice daily    He was previously on farxiga and stopped taking it in 2022 due to insurance.     He monitors blood glucose via lifestyle glucose meters  2 times per day. Blood glucose values range:Fastin-145            He denies hypoglycemic episodes   He  denies hypoglycemic unawareness.   He denies episodes of severe hypoglycemia requiring third party assistance.    He  is not wearing a medical alert bracelet or necklace.    He does not a glucagon emergency kit.    He reports attending diabetes education classes.  Diet: salads, steak, proteins. High vegetables, high proteins    Diabetes Complications   He  denies history of retinopathy.    He denies laser eye surgery.   Last eye exam: 2023.    He denies history of peripheral sensory neuropathy.    He reports numbness, tingling in left foot.    He denies history of foot sores.     He denies history of kidney damage.    He is on ACE inhibitor or ARB.   He is currently on lisinopril-hydrochlorothiazide 20-12.5 mg daily  BP: 112/60  No current blood work available    2. Hyperlipidemia  He denies history of coronary artery disease.    He  denies history of stroke and denies TIA.  He denies history of PAD.  He reports history of hyperlipidemia.  He is currently on atorvastatin 40 mg daily  No current blood work available    3. Hypertension  Currently taking lisinopril- hydrochlorothiazide 20-12.5 mg daily  BP:112/60  Followed by cardiology   Ulises    ROS:     CONS:     No fever, no chills, no weight loss, no fatigue   EYES:      No diplopia, no blurry vision, no redness of eyes, no swelling of eyelids   ENT:    No hearing loss, No ear pain, No sore throat, no dysphagia, no neck swelling   CV:     No chest pain, no palpitations, no claudication, no orthopnea, no PND   PULM:    No SOB, no cough, no hemoptysis, no wheezing    GI:   No nausea, no vomiting, no diarrhea, no constipation, no bloody stools   :  Passing urine well, no dysuria, no hematuria   ENDO:   No polyuria, no polydipsia, no heat intolerance, no cold intolerance   NEURO: No headaches, no dizziness, no convulsions, no tremors   MUSC:  No joint swellings, no arthralgias, no myalgias, no weakness   SKIN:   No rash, no ulcers, no dry skin   PSYCH:   No depression, no anxiety, no difficulty sleeping       Past Medical History:  Patient Active Problem List    Diagnosis Date Noted    Personal history of atrial fibrillation 02/14/2023    Rash and nonspecific skin eruption 02/12/2023    Pelvic abscess in male (HCC) 02/05/2023    Elevated lipase 02/05/2023    Chronic right shoulder pain 02/05/2023    Groin pain, left 02/05/2023    Normocytic anemia 02/05/2023    History of prostate cancer 02/05/2023    Type 2 diabetes mellitus with hyperglycemia (HCC) 08/21/2018    Benign essential HTN 08/21/2018    Mixed hyperlipidemia 08/21/2018       Past Surgical History:  Past Surgical History:   Procedure Laterality Date    UVULOPHARYNGOPALATOPLASTY          Allergies:  Ceftriaxone, Metronidazole, and Seasonal     Current Medications:    Current Outpatient Medications:     glimepiride (AMARYL) 4 MG Tab, Take 1 Tablet by mouth every morning., Disp: 90 Tablet, Rfl: 3    Insulin Degludec (TRESIBA FLEXTOUCH) 200 UNIT/ML Solution Pen-injector, Inject 50 Units under the skin at bedtime. 3 pens per month, Disp: 15 mL, Rfl: 5    lisinopril-hydrochlorothiazide (PRINZIDE, ZESTORETIC) 20-12.5 MG per tablet, TAKE ONE  TABLET BY MOUTH EVERY DAY (Patient taking differently: Take 1 Tablet by mouth every day.), Disp: 90 Tab, Rfl: 1    atorvastatin (LIPITOR) 40 MG Tab, TAKE ONE TABLET BY MOUTH EVERY DAY (Patient taking differently: Take 40 mg by mouth every day.), Disp: 90 Tab, Rfl: 1    metformin (GLUCOPHAGE) 1000 MG tablet, TAKE ONE TABLET BY MOUTH TWICE DAILY WITH MEALS (Patient taking differently: Take 1,000 mg by mouth 2 times a day with meals.), Disp: 180 Tab, Rfl: 2    metoprolol SR (TOPROL XL) 50 MG TABLET SR 24 HR, TAKE ONE TABLET BY MOUTH EVERY DAY (Patient taking differently: Take 50 mg by mouth every day.), Disp: 90 Tab, Rfl: 2    aspirin (ASA) 81 MG Chew Tab chewable tablet, Take 81 mg by mouth every day., Disp: , Rfl:     nitroglycerin (NITROSTAT) 0.4 MG SL Tab, Place 0.4 mg under tongue every 5 minutes as needed for Chest Pain., Disp: , Rfl:     Social History:  Social History     Socioeconomic History    Marital status:      Spouse name: Not on file    Number of children: Not on file    Years of education: Not on file    Highest education level: Not on file   Occupational History    Not on file   Tobacco Use    Smoking status: Never    Smokeless tobacco: Never   Vaping Use    Vaping Use: Never used   Substance and Sexual Activity    Alcohol use: Yes    Drug use: No    Sexual activity: Yes     Partners: Female     Comment: campus    Other Topics Concern    Not on file   Social History Narrative    Not on file     Social Determinants of Health     Financial Resource Strain: Not on file   Food Insecurity: Not on file   Transportation Needs: Not on file   Physical Activity: Not on file   Stress: Not on file   Social Connections: Not on file   Intimate Partner Violence: Not on file   Housing Stability: Not on file        Family History:   Family History   Problem Relation Age of Onset    Arthritis Mother     Osteoporosis Mother     Cancer Father        PHYSICAL EXAM:   Vital signs: /60   Pulse 84    "Ht 1.803 m (5' 11\")   Wt 86.6 kg (191 lb)   SpO2 97%   BMI 26.64 kg/m²   GENERAL: Well-developed, well-nourished  in no apparent distress.   EYE: No ocular and eyelid asymmetry, Anicteric sclerae,  PERRL, No exophthalmos or lidlag  HENT: Hearing grossly intact, Normocephalic, atraumatic. Pink, moist mucous membranes, No exudate  NECK: Supple. Trachea midline.   CARDIOVASCULAR: Regular rate and rhythm. No murmurs, rubs, or gallops.   LUNGS: Clear to auscultation bilaterally   ABDOMEN: Soft, nontender with positive bowel sounds.   EXTREMITIES: No clubbing, cyanosis, or edema.   NEUROLOGICAL: Cranial nerves II-XII are grossly intact   Symmetric reflexes at the patella no proximal muscle weakness, No visible tremor with both outstretched hands  LYMPH: No cervical, supraclavicular,  adenopathy palpated.   SKIN: No rashes, lesions. Turgor is normal.  FOOT: Normal sensation to monofilament testing, normal pulses, no ulcers.  Normal Vibration quantitative sensation test.    Labs:  Lab Results   Component Value Date/Time    HBA1C 10.3 (H) 02/06/2023 0241    AVGLUC 249 02/06/2023 0241       Lab Results   Component Value Date/Time    WBC 7.5 02/11/2023 02:34 AM    RBC 3.68 (L) 02/11/2023 02:34 AM    HEMOGLOBIN 10.6 (L) 02/11/2023 02:34 AM    MCV 88.0 02/11/2023 02:34 AM    MCH 28.8 02/11/2023 02:34 AM    MCHC 32.7 (L) 02/11/2023 02:34 AM    RDW 42.4 02/11/2023 02:34 AM    MPV 9.3 02/11/2023 02:34 AM       Lab Results   Component Value Date/Time    SODIUM 132 (L) 02/12/2023 01:39 AM    POTASSIUM 4.1 02/12/2023 01:39 AM    CHLORIDE 97 02/12/2023 01:39 AM    CO2 28 02/12/2023 01:39 AM    ANION 7.0 02/12/2023 01:39 AM    GLUCOSE 269 (H) 02/12/2023 01:39 AM    BUN 14 02/12/2023 01:39 AM    CREATININE 0.92 02/12/2023 01:39 AM    CALCIUM 8.5 02/12/2023 01:39 AM    ASTSGOT 31 02/08/2023 03:53 AM    ALTSGPT 30 02/08/2023 03:53 AM    TBILIRUBIN 0.4 02/08/2023 03:53 AM    ALBUMIN 2.7 (L) 02/08/2023 03:53 AM    TOTPROTEIN 5.9 (L) " 02/08/2023 03:53 AM    GLOBULIN 3.2 02/08/2023 03:53 AM    AGRATIO 0.8 02/08/2023 03:53 AM       No results found for: CHOLSTRLTOT, TRIGLYCERIDE, HDL, LDL, CHOLHDLRAT, NONHDL    No results found for: MICROALBCALC, MALBCRT, MALBEXCR, [id number], MICROALBUR, MICRALB, UMICROALBUM, MICROALBTIM     No results found for: TSHULTRASEN  No results found for: FREEDIR  No results found for: FREET3  No results found for: THYSTIMIG      ASSESSMENT/PLAN:     1. Type 2 diabetes mellitus with hyperglycemia, with long-term current use of insulin (HCC)  Unstable  Recommend Glimepiride 4mg every morning  Recommend Tresiba 50 units and titrate down by 1 unit for fasting blood sugars less than 80.   Discussed side effects of glimepiride with patient  Recommend checking blood sugars 3 times a day. Sugar log given to patient.   Continue eating high protein, more vegetables available.   Recommend exercise 30 mins daily  - CBC WITH DIFFERENTIAL; Future  - Comp Metabolic Panel; Future  - C-PEPTIDE; Future  - FREE THYROXINE; Future  - WENDI-65; Future  - MICROALBUMIN CREAT RATIO URINE; Future  - TSH; Future  - glimepiride (AMARYL) 4 MG Tab; Take 1 Tablet by mouth every morning.  Dispense: 90 Tablet; Refill: 3  - Insulin Degludec (TRESIBA FLEXTOUCH) 200 UNIT/ML Solution Pen-injector; Inject 50 Units under the skin at bedtime. 3 pens per month  Dispense: 15 mL; Refill: 5    2. Mixed hyperlipidemia  Unstable  Continue current regimen.   I will get updated lipid profile for further evaluation.   - Lipid Profile; Future    3. Benign essential HTN  Stable  Followed by cardiology     Return in about 2 months (around 5/6/2023). Patient will have blood work 1-2 weeks prior to next apt in 2 months.        This patient during there office visit was started on new medication.  Side effects of new medications were discussed with the patient today in the office. The patient was supplied paperwork on this new medication.    Thank you kindly for allowing me to  participate in the diabetes care plan for this patient.    Jorge Eisenberg, APRN   03/06/23    CC:   Pcp Not In Computer

## 2023-05-02 ENCOUNTER — APPOINTMENT (RX ONLY)
Dept: URBAN - METROPOLITAN AREA CLINIC 6 | Facility: CLINIC | Age: 68
Setting detail: DERMATOLOGY
End: 2023-05-02

## 2023-05-02 DIAGNOSIS — L82.1 OTHER SEBORRHEIC KERATOSIS: ICD-10-CM

## 2023-05-02 DIAGNOSIS — E13.620 OTHER SPECIFIED DIABETES MELLITUS WITH DIABETIC DERMATITIS: ICD-10-CM

## 2023-05-02 DIAGNOSIS — Z71.89 OTHER SPECIFIED COUNSELING: ICD-10-CM

## 2023-05-02 DIAGNOSIS — D18.0 HEMANGIOMA: ICD-10-CM

## 2023-05-02 DIAGNOSIS — L81.4 OTHER MELANIN HYPERPIGMENTATION: ICD-10-CM

## 2023-05-02 DIAGNOSIS — L57.0 ACTINIC KERATOSIS: ICD-10-CM

## 2023-05-02 DIAGNOSIS — L28.1 PRURIGO NODULARIS: ICD-10-CM | Status: INADEQUATELY CONTROLLED

## 2023-05-02 DIAGNOSIS — D22 MELANOCYTIC NEVI: ICD-10-CM

## 2023-05-02 PROBLEM — D18.01 HEMANGIOMA OF SKIN AND SUBCUTANEOUS TISSUE: Status: ACTIVE | Noted: 2023-05-02

## 2023-05-02 PROBLEM — D22.5 MELANOCYTIC NEVI OF TRUNK: Status: ACTIVE | Noted: 2023-05-02

## 2023-05-02 PROCEDURE — ? COUNSELING

## 2023-05-02 PROCEDURE — 17003 DESTRUCT PREMALG LES 2-14: CPT

## 2023-05-02 PROCEDURE — 99214 OFFICE O/P EST MOD 30 MIN: CPT | Mod: 25

## 2023-05-02 PROCEDURE — ? PRESCRIPTION MEDICATION MANAGEMENT

## 2023-05-02 PROCEDURE — 17000 DESTRUCT PREMALG LESION: CPT

## 2023-05-02 PROCEDURE — ? SUNSCREEN TREATMENT REGIMEN

## 2023-05-02 PROCEDURE — ? LIQUID NITROGEN

## 2023-05-02 PROCEDURE — ? PRESCRIPTION

## 2023-05-02 RX ORDER — CLOBETASOL PROPIONATE 0.5 MG/G
1 OINTMENT TOPICAL BID
Qty: 60 | Refills: 2 | Status: ERX | COMMUNITY
Start: 2023-05-02

## 2023-05-02 RX ADMIN — CLOBETASOL PROPIONATE 1: 0.5 OINTMENT TOPICAL at 00:00

## 2023-05-02 ASSESSMENT — LOCATION SIMPLE DESCRIPTION DERM
LOCATION SIMPLE: CHEST
LOCATION SIMPLE: ABDOMEN
LOCATION SIMPLE: RIGHT PRETIBIAL REGION
LOCATION SIMPLE: UPPER BACK
LOCATION SIMPLE: LEFT ZYGOMA
LOCATION SIMPLE: RIGHT FOREHEAD
LOCATION SIMPLE: RIGHT FOREARM
LOCATION SIMPLE: LEFT ANKLE
LOCATION SIMPLE: LEFT CHEEK
LOCATION SIMPLE: RIGHT HAND
LOCATION SIMPLE: LEFT FOREARM
LOCATION SIMPLE: LEFT EYEBROW
LOCATION SIMPLE: LEFT FOREHEAD
LOCATION SIMPLE: LEFT HAND
LOCATION SIMPLE: RIGHT CHEEK
LOCATION SIMPLE: LEFT PRETIBIAL REGION
LOCATION SIMPLE: POSTERIOR SCALP
LOCATION SIMPLE: LEFT TEMPLE

## 2023-05-02 ASSESSMENT — LOCATION DETAILED DESCRIPTION DERM
LOCATION DETAILED: LEFT ULNAR DORSAL HAND
LOCATION DETAILED: LEFT LATERAL EYEBROW
LOCATION DETAILED: RIGHT SUPERIOR POSTERIOR PARIETAL SCALP
LOCATION DETAILED: RIGHT PROXIMAL PRETIBIAL REGION
LOCATION DETAILED: LEFT INFERIOR PREAURICULAR CHEEK
LOCATION DETAILED: LEFT MEDIAL ZYGOMA
LOCATION DETAILED: RIGHT RADIAL DORSAL HAND
LOCATION DETAILED: LEFT CENTRAL ZYGOMA
LOCATION DETAILED: RIGHT PROXIMAL DORSAL FOREARM
LOCATION DETAILED: STERNUM
LOCATION DETAILED: RIGHT CENTRAL MALAR CHEEK
LOCATION DETAILED: RIGHT SUPERIOR MEDIAL MALAR CHEEK
LOCATION DETAILED: SUPERIOR THORACIC SPINE
LOCATION DETAILED: LEFT FOREHEAD
LOCATION DETAILED: LEFT CENTRAL TEMPLE
LOCATION DETAILED: LEFT DISTAL PRETIBIAL REGION
LOCATION DETAILED: LEFT ANKLE
LOCATION DETAILED: PERIUMBILICAL SKIN
LOCATION DETAILED: RIGHT MEDIAL MALAR CHEEK
LOCATION DETAILED: LEFT PROXIMAL DORSAL FOREARM
LOCATION DETAILED: LEFT INFERIOR FOREHEAD
LOCATION DETAILED: EPIGASTRIC SKIN
LOCATION DETAILED: RIGHT FOREHEAD
LOCATION DETAILED: POSTERIOR MID-PARIETAL SCALP

## 2023-05-02 ASSESSMENT — LOCATION ZONE DERM
LOCATION ZONE: SCALP
LOCATION ZONE: TRUNK
LOCATION ZONE: ARM
LOCATION ZONE: HAND
LOCATION ZONE: LEG
LOCATION ZONE: FACE

## 2023-12-21 DIAGNOSIS — Z79.4 TYPE 2 DIABETES MELLITUS WITH HYPERGLYCEMIA, WITH LONG-TERM CURRENT USE OF INSULIN (HCC): ICD-10-CM

## 2023-12-21 DIAGNOSIS — E11.65 TYPE 2 DIABETES MELLITUS WITH HYPERGLYCEMIA, WITH LONG-TERM CURRENT USE OF INSULIN (HCC): ICD-10-CM

## 2023-12-21 RX ORDER — GLIMEPIRIDE 4 MG/1
4 TABLET ORAL EVERY MORNING
Qty: 90 TABLET | Refills: 0 | Status: SHIPPED | OUTPATIENT
Start: 2023-12-21

## 2024-07-01 ENCOUNTER — APPOINTMENT (RX ONLY)
Dept: URBAN - METROPOLITAN AREA CLINIC 6 | Facility: CLINIC | Age: 69
Setting detail: DERMATOLOGY
End: 2024-07-01

## 2024-07-01 DIAGNOSIS — L82.1 OTHER SEBORRHEIC KERATOSIS: ICD-10-CM

## 2024-07-01 DIAGNOSIS — L57.0 ACTINIC KERATOSIS: ICD-10-CM

## 2024-07-01 DIAGNOSIS — L43.8 OTHER LICHEN PLANUS: ICD-10-CM

## 2024-07-01 DIAGNOSIS — D22 MELANOCYTIC NEVI: ICD-10-CM

## 2024-07-01 DIAGNOSIS — L81.4 OTHER MELANIN HYPERPIGMENTATION: ICD-10-CM

## 2024-07-01 DIAGNOSIS — Z71.89 OTHER SPECIFIED COUNSELING: ICD-10-CM

## 2024-07-01 DIAGNOSIS — L28.1 PRURIGO NODULARIS: ICD-10-CM

## 2024-07-01 DIAGNOSIS — D18.0 HEMANGIOMA: ICD-10-CM

## 2024-07-01 DIAGNOSIS — E13.620 OTHER SPECIFIED DIABETES MELLITUS WITH DIABETIC DERMATITIS: ICD-10-CM

## 2024-07-01 PROBLEM — L30.9 DERMATITIS, UNSPECIFIED: Status: ACTIVE | Noted: 2024-07-01

## 2024-07-01 PROBLEM — D18.01 HEMANGIOMA OF SKIN AND SUBCUTANEOUS TISSUE: Status: ACTIVE | Noted: 2024-07-01

## 2024-07-01 PROBLEM — D22.5 MELANOCYTIC NEVI OF TRUNK: Status: ACTIVE | Noted: 2024-07-01

## 2024-07-01 PROCEDURE — ? COUNSELING

## 2024-07-01 PROCEDURE — ? SUNSCREEN TREATMENT REGIMEN

## 2024-07-01 PROCEDURE — 17003 DESTRUCT PREMALG LES 2-14: CPT

## 2024-07-01 PROCEDURE — ? LIQUID NITROGEN

## 2024-07-01 PROCEDURE — 17000 DESTRUCT PREMALG LESION: CPT

## 2024-07-01 PROCEDURE — ? TREATMENT REGIMEN

## 2024-07-01 PROCEDURE — 99214 OFFICE O/P EST MOD 30 MIN: CPT | Mod: 25

## 2024-07-01 PROCEDURE — ? PRESCRIPTION

## 2024-07-01 RX ORDER — BETAMETHASONE DIPROPIONATE 0.5 MG/G
1 OINTMENT TOPICAL BID
Qty: 45 | Refills: 6 | Status: ERX | COMMUNITY
Start: 2024-07-01

## 2024-07-01 RX ADMIN — BETAMETHASONE DIPROPIONATE 1: 0.5 OINTMENT TOPICAL at 00:00

## 2024-07-01 ASSESSMENT — LOCATION DETAILED DESCRIPTION DERM
LOCATION DETAILED: RIGHT MEDIAL ZYGOMA
LOCATION DETAILED: SUPERIOR THORACIC SPINE
LOCATION DETAILED: PERIUMBILICAL SKIN
LOCATION DETAILED: RIGHT SUPERIOR FOREHEAD
LOCATION DETAILED: LEFT PROXIMAL DORSAL FOREARM
LOCATION DETAILED: EPIGASTRIC SKIN
LOCATION DETAILED: RIGHT FOREHEAD
LOCATION DETAILED: RIGHT PROXIMAL DORSAL FOREARM
LOCATION DETAILED: LEFT INFERIOR FOREHEAD
LOCATION DETAILED: DORSAL GLANS
LOCATION DETAILED: RIGHT CENTRAL MALAR CHEEK
LOCATION DETAILED: RIGHT RADIAL DORSAL HAND
LOCATION DETAILED: LEFT FOREHEAD
LOCATION DETAILED: LEFT ANKLE
LOCATION DETAILED: RIGHT INFERIOR HELIX
LOCATION DETAILED: LEFT ULNAR DORSAL HAND
LOCATION DETAILED: LEFT DISTAL PRETIBIAL REGION
LOCATION DETAILED: LEFT INFERIOR HELIX
LOCATION DETAILED: LEFT MEDIAL ZYGOMA
LOCATION DETAILED: STERNUM
LOCATION DETAILED: RIGHT PROXIMAL PRETIBIAL REGION
LOCATION DETAILED: LEFT INFERIOR LATERAL MALAR CHEEK
LOCATION DETAILED: LEFT CENTRAL MALAR CHEEK

## 2024-07-01 ASSESSMENT — LOCATION ZONE DERM
LOCATION ZONE: PENIS
LOCATION ZONE: HAND
LOCATION ZONE: LEG
LOCATION ZONE: ARM
LOCATION ZONE: EAR
LOCATION ZONE: TRUNK
LOCATION ZONE: FACE

## 2024-07-01 ASSESSMENT — LOCATION SIMPLE DESCRIPTION DERM
LOCATION SIMPLE: LEFT FOREARM
LOCATION SIMPLE: LEFT CHEEK
LOCATION SIMPLE: RIGHT PRETIBIAL REGION
LOCATION SIMPLE: RIGHT CHEEK
LOCATION SIMPLE: ABDOMEN
LOCATION SIMPLE: LEFT PRETIBIAL REGION
LOCATION SIMPLE: LEFT ZYGOMA
LOCATION SIMPLE: LEFT HAND
LOCATION SIMPLE: RIGHT HAND
LOCATION SIMPLE: RIGHT ZYGOMA
LOCATION SIMPLE: RIGHT FOREHEAD
LOCATION SIMPLE: PENIS
LOCATION SIMPLE: UPPER BACK
LOCATION SIMPLE: RIGHT EAR
LOCATION SIMPLE: CHEST
LOCATION SIMPLE: LEFT FOREHEAD
LOCATION SIMPLE: LEFT ANKLE
LOCATION SIMPLE: LEFT EAR
LOCATION SIMPLE: RIGHT FOREARM

## 2025-07-01 ENCOUNTER — APPOINTMENT (OUTPATIENT)
Dept: URBAN - METROPOLITAN AREA CLINIC 6 | Facility: CLINIC | Age: 70
Setting detail: DERMATOLOGY
End: 2025-07-01

## 2025-07-01 DIAGNOSIS — E13.620 OTHER SPECIFIED DIABETES MELLITUS WITH DIABETIC DERMATITIS: ICD-10-CM

## 2025-07-01 DIAGNOSIS — Z71.89 OTHER SPECIFIED COUNSELING: ICD-10-CM

## 2025-07-01 DIAGNOSIS — L81.4 OTHER MELANIN HYPERPIGMENTATION: ICD-10-CM

## 2025-07-01 DIAGNOSIS — D18.0 HEMANGIOMA: ICD-10-CM

## 2025-07-01 DIAGNOSIS — D22 MELANOCYTIC NEVI: ICD-10-CM

## 2025-07-01 DIAGNOSIS — L28.1 PRURIGO NODULARIS: ICD-10-CM

## 2025-07-01 DIAGNOSIS — L82.1 OTHER SEBORRHEIC KERATOSIS: ICD-10-CM

## 2025-07-01 DIAGNOSIS — L57.0 ACTINIC KERATOSIS: ICD-10-CM

## 2025-07-01 PROBLEM — D18.01 HEMANGIOMA OF SKIN AND SUBCUTANEOUS TISSUE: Status: ACTIVE | Noted: 2025-07-01

## 2025-07-01 PROBLEM — D22.5 MELANOCYTIC NEVI OF TRUNK: Status: ACTIVE | Noted: 2025-07-01

## 2025-07-01 PROCEDURE — ? SUNSCREEN TREATMENT REGIMEN

## 2025-07-01 PROCEDURE — ? PRESCRIPTION

## 2025-07-01 PROCEDURE — ? ADDITIONAL NOTES

## 2025-07-01 PROCEDURE — ? LIQUID NITROGEN

## 2025-07-01 PROCEDURE — ? COUNSELING

## 2025-07-01 RX ORDER — BETAMETHASONE DIPROPIONATE 0.5 MG/G
1 OINTMENT TOPICAL BID
Qty: 15 | Refills: 4 | Status: ERX | COMMUNITY
Start: 2025-07-01

## 2025-07-01 RX ADMIN — BETAMETHASONE DIPROPIONATE 1: 0.5 OINTMENT TOPICAL at 00:00

## 2025-07-01 ASSESSMENT — LOCATION SIMPLE DESCRIPTION DERM
LOCATION SIMPLE: NECK
LOCATION SIMPLE: LEFT FOREHEAD
LOCATION SIMPLE: LEFT FOREARM
LOCATION SIMPLE: UPPER BACK
LOCATION SIMPLE: LEFT PRETIBIAL REGION
LOCATION SIMPLE: RIGHT PRETIBIAL REGION
LOCATION SIMPLE: LEFT ANKLE
LOCATION SIMPLE: LEFT HAND
LOCATION SIMPLE: SCALP
LOCATION SIMPLE: POSTERIOR NECK
LOCATION SIMPLE: CHEST
LOCATION SIMPLE: RIGHT FOREARM
LOCATION SIMPLE: ABDOMEN
LOCATION SIMPLE: RIGHT HAND
LOCATION SIMPLE: LEFT CHEEK
LOCATION SIMPLE: LEFT OCCIPITAL SCALP
LOCATION SIMPLE: RIGHT OCCIPITAL SCALP
LOCATION SIMPLE: RIGHT FOREHEAD
LOCATION SIMPLE: RIGHT CHEEK

## 2025-07-01 ASSESSMENT — LOCATION ZONE DERM
LOCATION ZONE: LEG
LOCATION ZONE: SCALP
LOCATION ZONE: NECK
LOCATION ZONE: FACE
LOCATION ZONE: HAND
LOCATION ZONE: TRUNK
LOCATION ZONE: ARM

## 2025-07-01 ASSESSMENT — LOCATION DETAILED DESCRIPTION DERM
LOCATION DETAILED: RIGHT FOREHEAD
LOCATION DETAILED: STERNUM
LOCATION DETAILED: LEFT DISTAL PRETIBIAL REGION
LOCATION DETAILED: LEFT INFERIOR CENTRAL MALAR CHEEK
LOCATION DETAILED: LEFT ANKLE
LOCATION DETAILED: RIGHT SUPERIOR LATERAL MALAR CHEEK
LOCATION DETAILED: LEFT FOREHEAD
LOCATION DETAILED: RIGHT SUPERIOR CENTRAL MALAR CHEEK
LOCATION DETAILED: PERIUMBILICAL SKIN
LOCATION DETAILED: LEFT SUPERIOR POSTERIOR NECK
LOCATION DETAILED: LEFT PROXIMAL DORSAL FOREARM
LOCATION DETAILED: RIGHT PROXIMAL PRETIBIAL REGION
LOCATION DETAILED: RIGHT SUPERIOR PARIETAL SCALP
LOCATION DETAILED: LEFT SUPERIOR OCCIPITAL SCALP
LOCATION DETAILED: RIGHT INFERIOR CENTRAL MALAR CHEEK
LOCATION DETAILED: RIGHT RADIAL DORSAL HAND
LOCATION DETAILED: EPIGASTRIC SKIN
LOCATION DETAILED: RIGHT PROXIMAL DORSAL FOREARM
LOCATION DETAILED: LEFT ULNAR DORSAL HAND
LOCATION DETAILED: RIGHT SUPERIOR OCCIPITAL SCALP
LOCATION DETAILED: RIGHT LATERAL TRAPEZIAL NECK
LOCATION DETAILED: LEFT MEDIAL TRAPEZIAL NECK
LOCATION DETAILED: SUPERIOR THORACIC SPINE
LOCATION DETAILED: LEFT CENTRAL MALAR CHEEK

## 2025-07-01 NOTE — PROCEDURE: ADDITIONAL NOTES
Detail Level: Simple
Additional Notes: Pt reports he forgets to apply betamethasone but will continue using to tx. Recommended duoderm and provided sample
Render Risk Assessment In Note?: no

## 2025-07-01 NOTE — PROCEDURE: LIQUID NITROGEN
Duration Of Freeze Thaw-Cycle (Seconds): 10
Number Of Freeze-Thaw Cycles: 2 freeze-thaw cycles
Consent: The patient's consent was obtained including but not limited to risks of crusting, scabbing, blistering, scarring, darker or lighter pigmentary change, recurrence, incomplete removal and infection.
Render Note In Bullet Format When Appropriate: No
Show Aperture Variable?: Yes
Detail Level: Detailed
Post-Care Instructions: I reviewed with the patient in detail post-care instructions. Patient is to wear sunprotection, and avoid picking at any of the treated lesions. Pt may apply Vaseline to crusted or scabbing areas.